# Patient Record
Sex: FEMALE | Race: WHITE | Employment: OTHER | URBAN - METROPOLITAN AREA
[De-identification: names, ages, dates, MRNs, and addresses within clinical notes are randomized per-mention and may not be internally consistent; named-entity substitution may affect disease eponyms.]

---

## 2017-07-28 ENCOUNTER — OFFICE VISIT (OUTPATIENT)
Dept: BEHAVIORAL/MENTAL HEALTH CLINIC | Age: 62
End: 2017-07-28

## 2017-07-28 VITALS
HEART RATE: 72 BPM | SYSTOLIC BLOOD PRESSURE: 144 MMHG | WEIGHT: 169 LBS | HEIGHT: 62 IN | BODY MASS INDEX: 31.1 KG/M2 | DIASTOLIC BLOOD PRESSURE: 62 MMHG

## 2017-07-28 DIAGNOSIS — F41.9 ANXIETY DISORDER, UNSPECIFIED TYPE: ICD-10-CM

## 2017-07-28 DIAGNOSIS — G47.00 INSOMNIA, UNSPECIFIED TYPE: ICD-10-CM

## 2017-07-28 DIAGNOSIS — F32.2 SEVERE MAJOR DEPRESSIVE DISORDER (HCC): Primary | ICD-10-CM

## 2017-07-28 RX ORDER — ONDANSETRON HYDROCHLORIDE 8 MG/1
TABLET, FILM COATED ORAL
Refills: 0 | COMMUNITY
Start: 2017-07-10 | End: 2017-12-18 | Stop reason: ALTCHOICE

## 2017-07-28 RX ORDER — CITALOPRAM 20 MG/1
TABLET, FILM COATED ORAL
Qty: 45 TAB | Refills: 0 | Status: SHIPPED | OUTPATIENT
Start: 2017-07-28 | End: 2017-09-12

## 2017-07-28 RX ORDER — GLIPIZIDE 5 MG/1
10 TABLET, FILM COATED, EXTENDED RELEASE ORAL DAILY
COMMUNITY
Start: 2017-06-06 | End: 2022-05-13

## 2017-07-28 RX ORDER — METFORMIN HYDROCHLORIDE 500 MG/1
TABLET, EXTENDED RELEASE ORAL
COMMUNITY
Start: 2017-05-27 | End: 2018-08-07 | Stop reason: ALTCHOICE

## 2017-07-28 NOTE — PROGRESS NOTES
Psychiatric Initial Evaluation     Chief Complaint: had concerns including Mental Health Problem. History of Present Illness: Janet Almanzar is a 60 y.o. female who presented today to establish her OP psychiatric care. Pt gives long history of depression, starting since her childhood, states she was a lonely child, had no friends. As long as she remembers she has always been sad and depressed. She started seeking help for her depression in her 35s. She saw multiple psychiatrist since then, her last one being Dr. Madalyn Bowman whom she saw about 6-7 yrs ago. During this time period she took multiple psych meds, none of them worked. She started taking Celexa through her PCP for the last several yrs. Although she is on full therapeutic dose of Celexa, she still feels depressed, feels lethargic, tired, does not want to do anything, feels hopeless and helpless, feels worthless, has poor focus and concentration. Does not have any active suicidal thoughts but wishes that she was never born. Not sleeping well, appetite is off. She feels that her anxiety is bad again. She denies any YESSENIA, denies any psychotic or manic symptoms, denies any OCD symptoms. Does verbalize having relationship problems with her  of 39 yrs, from whom she was  for 2 yrs in the past, but got back together later.      Past Psychiatric History:     Previous psychiatric care: as noted above   Previous suicide attempts: none reported   Previous hospitalizations: once, long time ago  Current psychotropics: Celexa, Sonata, Klonopin  Previous psychotropics: Prozac, Lexapro, Zoloft, Effexor, Elavil, Cymbalta  Substance use: drinks alcohol occasionally, used marijuana once in the past  Rehab history: none reported           Social History:   Pt was brought up by both parents, there was no abuse reported, pt reports being a shy child, had no friends, felt lonely, youngest of 1 sister and 2 brothers, completed HS and some college, worked in [employer] for 16 yrs, took early alf to take care of her mom who passed away a yr ago. Pt is  for the last 39 yrs, has 2 grown children, lives with her , was  from her  in the past for 2 yrs. Drinks alcohol occasionally, does report smoking marijuana once in the past.     Family History: Mother with depression, daughter with depression  Family History   Problem Relation Age of Onset    Post-op Nausea/Vomiting Mother     Cancer Paternal Aunt      breast        Past Medical History:   Past Medical History:   Diagnosis Date    Arthritis     Breast disorder 03/24/2011    fibrocystic breasts    Chest pain, unspecified 11/10/2010    Chronic pain     abdomen/stomach/constipation    Essential hypertension, benign 11/10/2010    Family history of cardiovascular disease 11/10/2010    Fibromyalgia     Genital herpes     GERD (gastroesophageal reflux disease)     Hypertension     Mixed hyperlipidemia 11/10/2010    Nausea & vomiting     Nonspecific abnormal electrocardiogram (ECG) (EKG) 11/10/2010    Osteopenia     Palpitations 11/10/2010    Sleep apnea     does not sleep with machine but carries the dx          Allergies: Allergies   Allergen Reactions    Augmentin [Amoxicillin-Pot Clavulanate] Diarrhea and Nausea and Vomiting    Darvocet A500 [Propoxyphene N-Acetaminophen] Other (comments)     Heart flutters    Doxycycline Other (comments)     Stomach pain and diarrhea. Has taken since without problems.  Gentamicin Swelling    Percocet [Oxycodone-Acetaminophen] Rash and Itching    Stelazine Swelling     Tongue swelling    Sulfa (Sulfonamide Antibiotics) Hives    Sumycin [Tetracycline] Other (comments)     Tongue tingling    Zegerid [Omeprazole-Sodium Bicarbonate] Unknown (comments)     Takes nexium without problems, but if she takes zegerid, she has nausea and sweating.         Medication List:   Current Outpatient Prescriptions   Medication Sig Dispense Refill  glipiZIDE SR (GLUCOTROL XL) 5 mg CR tablet       metFORMIN ER (GLUCOPHAGE XR) 500 mg tablet       ondansetron hcl (ZOFRAN) 8 mg tablet TAKE 1 TABLET BY MOUTH EVERY 8 HOURS AS NEEDED FOR NAUSEA AND/OR VOMITING. MAY CAUSE CONSTIPATION AND HEADACHE  0    citalopram (CELEXA) 20 mg tablet Take 1/1/2 tablet daily for 15 days then take 1 tablet daily  Indications: ANXIETY WITH DEPRESSION 45 Tab 0    vortioxetine (TRINTELLIX) 10 mg tablet Take 1 Tab by mouth daily. Indications: major depressive disorder 30 Tab 0    diltiazem CD (CARTIA XT) 240 mg ER capsule Take 240 mg by mouth daily.  ASPIRIN PO Take 81 mg by mouth daily.  esomeprazole (NEXIUM) 20 mg capsule Take 40 mg by mouth daily.  cholecalciferol (VITAMIN D3) 1,000 unit tablet Take 1,000 Units by mouth daily.  IBUPROFEN (ADVIL PO) Take  by mouth as needed.  estradiol (VAGIFEM) 10 mcg tab vaginal tablet Insert 10 mcg into vagina two (2) days a week.  zaleplon (SONATA) 5 mg capsule Take 5 mg by mouth nightly as needed.  cetirizine (ZYRTEC) 10 mg tablet Take 10 mg by mouth nightly.  raloxifene (EVISTA) 60 mg tablet Take 60 mg by mouth daily.  clonazepam (KLONOPIN) 1 mg tablet Take 1 mg by mouth two (2) times daily as needed.  fenofibrate 160 mg Tab Take 1 Tab by mouth nightly.  valacyclovir (VALTREX) 1 g tablet Take 1,000 mg by mouth as needed. A comprehensive review of systems was negative except for that written in the HPI. .  The client currently denies suicidal and homicidal ideation. Client reports depression, anxiety, poor energy, hopelessness, helplessness, worthlessness. Client denies auditory and visual hallucinations.            Mental Status exam: WNL except for    Sensorium  oriented to time, place and person   Relations cooperative    Eye Contact    appropriate   Appearance:  age appropriate and appropriately dressed and groomed, sad looking, obese   Motor Behavior:  within normal limits   Speech:  normal pitch, normal volume and soft   Thought Process: goal directed and logical   Thought Content free of delusions and free of hallucinations   Suicidal ideations none   Homicidal ideations none   Mood:  Anxious and depressed    Affect:  Constricted and depressed    Memory recent  adequate   Memory remote:  adequate   Concentration:  adequate   Abstraction:  abstract   Insight:  good   Reliability good   Judgment:  good     Diagnoses:   Axis I:  Major Depressive disorder, recurrent, severe, without psychotic features             Anxiety disorder             Insomnia   Axis II: Deferred  Axis III: As noted above  Axis IV:Moderate to Severe  Axis V: 55-60    Assessment:  The client, Wanda Blackmon is a 60 y.o. female presented today to establish her OP psychiatric care. Pt has life long depression and anxiety, started getting treatment since in her 35s, seen several psychiatrists since then and received multiple meds, none seemed to have worked. Last seen psychiatrist about 7 yrs ago, currently receiving Celexa from her PCP, continues to endorse depressed mood, anxiety, hopelessness and helplessness, chronic wishes of not being born, no active suicidal ideation, no homicidal ideations, no psychosis or leroy present. Treatment Plan:   1. Medication: Trintellix 10mg daily, taper down Celexa, 30mg daily for 15 days, then 20mg daily, Sonata and Klonopin as needed for sleep and anxiety respectively. 2. Psychotherapy: Provided supportive psychotherapy  3. Medical: follow up with PCP as scheduled  4. Follow-up Disposition:  Return in about 1 month (around 8/28/2017). The risk versus benefits of treatment were discussed and side effects explained. the risks and benefits of the proposed medication    Patient verbalized understanding and agreed with plan. Patient instructed to call with any side effects.      Maryann Arita MD, Psychiatry  7/28/2017

## 2017-07-28 NOTE — MR AVS SNAPSHOT
Visit Information Date & Time Provider Department Dept. Phone Encounter #  
 7/28/2017 10:00 AM Laura Moses MD [facility] [id number] Follow-up Instructions Return in about 1 month (around 8/28/2017). Upcoming Health Maintenance Date Due Hepatitis C Screening 1955 DTaP/Tdap/Td series (1 - Tdap) 1/28/1975 PAP AKA CERVICAL CYTOLOGY 1/28/1975 FOBT Q 1 YEAR AGE 50-75 1/28/2004 BREAST CANCER SCRN MAMMOGRAM 7/7/2011 ZOSTER VACCINE AGE 60> 11/28/2013 INFLUENZA AGE 9 TO ADULT 8/1/2017 Allergies as of 7/28/2017  Review Complete On: 7/28/2017 By: Laura Moses MD  
  
 Severity Noted Reaction Type Reactions Augmentin [Amoxicillin-pot Clavulanate]  07/22/2016    Diarrhea, Nausea and Vomiting Darvocet A500 [Propoxyphene N-acetaminophen]  11/10/2010    Other (comments) Heart flutters Doxycycline  11/10/2010    Other (comments) Stomach pain and diarrhea. Has taken since without problems. Gentamicin  07/22/2016    Swelling Percocet [Oxycodone-acetaminophen]  11/10/2010    Rash, Itching Stelazine  03/10/2011    Swelling Tongue swelling Sulfa (Sulfonamide Antibiotics)  11/10/2010    Hives Sumycin [Tetracycline]  03/10/2011    Other (comments) Tongue tingling Zegerid [Omeprazole-sodium Bicarbonate]  11/10/2010    Unknown (comments) Takes nexium without problems, but if she takes zegerid, she has nausea and sweating. Current Immunizations  Never Reviewed No immunizations on file. Not reviewed this visit You Were Diagnosed With   
  
 Codes Comments Severe major depressive disorder ([facility] [address]    -  Primary ICD-10-CM: T68.6 ICD-9-CM: 296.23 Anxiety disorder, unspecified type     ICD-10-CM: F41.9 ICD-9-CM: 300.00 Insomnia, unspecified type     ICD-10-CM: G47.00 ICD-9-CM: 780.52 Vitals BP Pulse Height(growth percentile) Weight(growth percentile) BMI OB Status 144/62 72 5' 2\" (1.575 m) 169 lb (76.7 kg) 30.91 kg/m2 Hysterectomy Smoking Status Former Smoker BMI and BSA Data Body Mass Index Body Surface Area 30.91 kg/m 2 1.83 m 2 Preferred Pharmacy Pharmacy Name Phone [facility] [address] [place] [address] Oskar Stratton [phone] Your Updated Medication List  
  
   
This list is accurate as of: 7/28/17 10:43 AM.  Always use your most recent med list. ADVIL PO Take  by mouth as needed. ASPIRIN PO Take 81 mg by mouth daily. CARTIA  mg ER capsule Generic drug:  dilTIAZem CD Take 240 mg by mouth daily. cetirizine 10 mg tablet Commonly known as:  ZYRTEC Take 10 mg by mouth nightly. citalopram 20 mg tablet Commonly known as:  Eugenia Browning Take 1/1/2 tablet daily for 15 days then take 1 tablet daily  Indications: ANXIETY WITH DEPRESSION  
  
 clonazePAM 1 mg tablet Commonly known as:  Saul Pool Take 1 mg by mouth two (2) times daily as needed. estradiol 10 mcg Tab vaginal tablet Commonly known as:  Ivan Velazquez Insert 10 mcg into vagina two (2) days a week. fenofibrate 160 mg tablet Commonly known as:  LOFIBRA Take 1 Tab by mouth nightly. glipiZIDE SR 5 mg CR tablet Commonly known as:  GLUCOTROL XL  
  
 metFORMIN  mg tablet Commonly known as:  GLUCOPHAGE XR NexIUM 20 mg capsule Generic drug:  esomeprazole Take 40 mg by mouth daily. ondansetron hcl 8 mg tablet Commonly known as:  ZOFRAN  
TAKE 1 TABLET BY MOUTH EVERY 8 HOURS AS NEEDED FOR NAUSEA AND/OR VOMITING. MAY CAUSE CONSTIPATION AND HEADACHE  
  
 raloxifene 60 mg tablet Commonly known as:  EVISTA Take 60 mg by mouth daily. valACYclovir 1 gram tablet Commonly known as:  VALTREX Take 1,000 mg by mouth as needed. VITAMIN D3 1,000 unit tablet Generic drug:  cholecalciferol Take 1,000 Units by mouth daily. vortioxetine 10 mg tablet Commonly known as:  TRINTELLIX Take 1 Tab by mouth daily. Indications: major depressive disorder  
  
 zaleplon 5 mg capsule Commonly known as:  SONATA Take 5 mg by mouth nightly as needed. Prescriptions Sent to Pharmacy Refills  
 citalopram (CELEXA) 20 mg tablet 0 Sig: Take 1/1/2 tablet daily for 15 days then take 1 tablet daily  Indications: ANXIETY WITH DEPRESSION Class: Normal  
 Pharmacy: [address], [address] [place] [address] Ph #: [phone]  
 vortioxetine (TRINTELLIX) 10 mg tablet 0 Sig: Take 1 Tab by mouth daily. Indications: major depressive disorder Class: Normal  
 Pharmacy: [address], [address] [place] [address] [place] [address]  [phone] Ph #: [phone] Route: Oral  
  
Follow-up Instructions Return in about 1 month (around 8/28/2017). Introducing [facility]! [employer] introduces [employer] patient portal. Now you can access parts of your medical record, email your doctor's office, and request medication refills online. 1. In your internet browser, go to [email or web address] 2. Click on the First Time User? Click Here link in the Sign In box. You will see the New Member Sign Up page. 3. Enter your Dot[employer] Access Code exactly as it appears below. You will not need to use this code after youve completed the sign-up process. If you do not sign up before the expiration date, you must request a new code. · Dot[employer] Access Code: [email or web address] Expires: 10/26/2017 10:26 AM 
 
4. Enter the last four digits of your Social Security Number (xxxx) and Date of Birth (mm/dd/yyyy) as indicated and click Submit. You will be taken to the next sign-up page. 5. Create a DotGT ID. This will be your DotGT login ID and cannot be changed, so think of one that is secure and easy to remember. 6. Create a [employer] password. You can change your password at any time. 7. Enter your Password Reset Question and Answer. This can be used at a later time if you forget your password. 8. Enter your e-mail address. You will receive e-mail notification when new information is available in [address] 9. Click Sign Up. You can now view and download portions of your medical record. 10. Click the Download Summary menu link to download a portable copy of your medical information. If you have questions, please visit the Frequently Asked Questions section of the [employer] website. Remember, [employer] is NOT to be used for urgent needs. For medical emergencies, dial 911. Now available from your iPhone and Android! Please provide this summary of care documentation to your next provider. Your primary care clinician is listed as George Hugo. If you have any questions after today's visit, please call [phone].

## 2017-09-12 ENCOUNTER — OFFICE VISIT (OUTPATIENT)
Dept: BEHAVIORAL/MENTAL HEALTH CLINIC | Age: 62
End: 2017-09-12

## 2017-09-12 VITALS
BODY MASS INDEX: 32.2 KG/M2 | SYSTOLIC BLOOD PRESSURE: 148 MMHG | DIASTOLIC BLOOD PRESSURE: 77 MMHG | HEART RATE: 91 BPM | HEIGHT: 62 IN | RESPIRATION RATE: 20 BRPM | TEMPERATURE: 98.6 F | WEIGHT: 175 LBS

## 2017-09-12 DIAGNOSIS — F41.9 ANXIETY DISORDER, UNSPECIFIED TYPE: ICD-10-CM

## 2017-09-12 DIAGNOSIS — F39 MOOD DISORDER (HCC): Primary | ICD-10-CM

## 2017-09-12 RX ORDER — LAMOTRIGINE 25 MG/1
TABLET ORAL
Qty: 30 TAB | Refills: 0 | Status: SHIPPED | OUTPATIENT
Start: 2017-09-12 | End: 2017-10-05 | Stop reason: DRUGHIGH

## 2017-09-12 RX ORDER — CLONAZEPAM 1 MG/1
1 TABLET ORAL
Qty: 60 TAB | Refills: 0 | Status: SHIPPED | OUTPATIENT
Start: 2017-09-12 | End: 2017-10-05

## 2017-09-12 NOTE — MR AVS SNAPSHOT
Visit Information Date & Time Provider Department Dept. Phone Encounter #  
 9/12/2017 11:30 AM [place] Moses [place] [address] [facility] [phone] [id number] Follow-up Instructions Return in about 1 month (around 10/12/2017). Upcoming Health Maintenance Date Due Hepatitis C Screening 1955 DTaP/Tdap/Td series (1 - Tdap) 1/28/1975 PAP AKA CERVICAL CYTOLOGY 1/28/1975 FOBT Q 1 YEAR AGE 50-75 1/28/2004 BREAST CANCER SCRN MAMMOGRAM 7/7/2011 ZOSTER VACCINE AGE 60> 11/28/2013 INFLUENZA AGE 9 TO ADULT 8/1/2017 Allergies as of 9/12/2017  Review Complete On: 9/12/2017 By: Laura Moses MD  
  
 Severity Noted Reaction Type Reactions Augmentin [Amoxicillin-pot Clavulanate]  07/22/2016    Diarrhea, Nausea and Vomiting Darvocet A500 [Propoxyphene N-acetaminophen]  11/10/2010    Other (comments) Heart flutters Doxycycline  11/10/2010    Other (comments) Stomach pain and diarrhea. Has taken since without problems. Gentamicin  07/22/2016    Swelling Percocet [Oxycodone-acetaminophen]  11/10/2010    Rash, Itching Stelazine  03/10/2011    Swelling Tongue swelling Sulfa (Sulfonamide Antibiotics)  11/10/2010    Hives Sumycin [Tetracycline]  03/10/2011    Other (comments) Tongue tingling Zegerid [Omeprazole-sodium Bicarbonate]  11/10/2010    Unknown (comments) Takes nexium without problems, but if she takes zegerid, she has nausea and sweating. Current Immunizations  Never Reviewed No immunizations on file. Not reviewed this visit You Were Diagnosed With   
  
 Codes Comments Mood disorder ([facility]    -  Primary ICD-10-CM: F39 
ICD-9-CM: 296.90 Anxiety disorder, unspecified type     ICD-10-CM: F41.9 ICD-9-CM: 300.00 Vitals BP Pulse Temp Resp Height(growth percentile) Weight(growth percentile) 148/77 91 98.6 °F (37 °C) (Oral) 20 5' 2\" (1.575 m) 175 lb (79.4 kg) BMI OB Status Smoking Status 32.01 kg/m2 Hysterectomy Former Smoker Vitals History BMI and BSA Data Body Mass Index Body Surface Area 32.01 kg/m 2 1.86 m 2 Preferred Pharmacy Pharmacy Name Phone [facility] [address] Heather Choudhary [phone] Your Updated Medication List  
  
   
This list is accurate as of: 9/12/17 12:20 PM.  Always use your most recent med list. ADVIL PO Take 800 mg by mouth as needed for Pain. ASPIRIN PO Take 81 mg by mouth daily. CARTIA  mg ER capsule Generic drug:  dilTIAZem CD Take 240 mg by mouth daily. cetirizine 10 mg tablet Commonly known as:  ZYRTEC Take 10 mg by mouth nightly. clonazePAM 1 mg tablet Commonly known as:  Luis Daniel Sheppard Take 1 Tab by mouth two (2) times daily as needed. Max Daily Amount: 2 mg.  
  
 estradiol 10 mcg Tab vaginal tablet Commonly known as:  Erin [employer] Insert 10 mcg into vagina two (2) days a week. fenofibrate 160 mg tablet Commonly known as:  LOFIBRA Take 1 Tab by mouth nightly. glipiZIDE SR 5 mg CR tablet Commonly known as:  GLUCOTROL XL Take 5 mg by mouth daily. lamoTRIgine 25 mg tablet Commonly known as: LaMICtal  
Take 1/2 tablet daily for a week then 1 tablet daily  Indications: mood disorder  
  
 metFORMIN  mg tablet Commonly known as:  GLUCOPHAGE XR NexIUM 20 mg capsule Generic drug:  esomeprazole Take 40 mg by mouth daily. ondansetron hcl 8 mg tablet Commonly known as:  ZOFRAN  
TAKE 1 TABLET BY MOUTH EVERY 8 HOURS AS NEEDED FOR NAUSEA AND/OR VOMITING. MAY CAUSE CONSTIPATION AND HEADACHE  
  
 raloxifene 60 mg tablet Commonly known as:  EVISTA Take 60 mg by mouth daily. valACYclovir 1 gram tablet Commonly known as:  VALTREX Take 1,000 mg by mouth as needed. VITAMIN D3 1,000 unit tablet Generic drug:  cholecalciferol Take 1,000 Units by mouth daily. vortioxetine 10 mg tablet Commonly known as:  TRINTELLIX Take 1/2 tablet daily for 7 days and then stop.  
  
 zaleplon 5 mg capsule Commonly known as:  SONATA Take 5 mg by mouth nightly as needed. Prescriptions Printed Refills  
 clonazePAM (KLONOPIN) 1 mg tablet 0 Sig: Take 1 Tab by mouth two (2) times daily as needed. Max Daily Amount: 2 mg. Class: Print Route: Oral  
 lamoTRIgine (LAMICTAL) 25 mg tablet 0 Sig: Take 1/2 tablet daily for a week then 1 tablet daily  Indications: mood disorder Class: Print Follow-up Instructions Return in about 1 month (around 10/12/2017). Introducing [facility]! [employer] introduces [employer] patient portal. Now you can access parts of your medical record, email your doctor's office, and request medication refills online. 1. In your internet browser, go to [email or web address] 2. Click on the First Time User? Click Here link in the Sign In box. You will see the New Member Sign Up page. 3. Enter your [employer] Access Code exactly as it appears below. You will not need to use this code after youve completed the sign-up process. If you do not sign up before the expiration date, you must request a new code. · [employer] Access Code: [email or web address] Expires: 10/26/2017 10:26 AM 
 
4. Enter the last four digits of your Social Security Number (xxxx) and Date of Birth (mm/dd/yyyy) as indicated and click Submit. You will be taken to the next sign-up page. 5. Create a Metrolightt ID. This will be your [employer] login ID and cannot be changed, so think of one that is secure and easy to remember. 6. Create a [employer] password. You can change your password at any time. 7. Enter your Password Reset Question and Answer. This can be used at a later time if you forget your password. 8. Enter your e-mail address.  You will receive e-mail notification when new information is available in [employer]. 9. Click Sign Up. You can now view and download portions of your medical record. 10. Click the Download Summary menu link to download a portable copy of your medical information. If you have questions, please visit the Frequently Asked Questions section of the [employer] website. Remember, [employer] is NOT to be used for urgent needs. For medical emergencies, dial 911. Now available from your iPhone and Android! Please provide this summary of care documentation to your next provider. Your primary care clinician is listed as Yelena Salazar. If you have any questions after today's visit, please call [phone].

## 2017-09-12 NOTE — PROGRESS NOTES
Chief Complaint   Patient presents with    Depression    Anxiety     Here for f/u.    1. Have you been to the ER, urgent care clinic since your last visit? Hospitalized since your last visit? No     2. Have you seen or consulted any other health care providers outside of the [address] [place] [address] since your last visit? Include any pap smears or colon screening.  No

## 2017-09-13 ENCOUNTER — OFFICE VISIT (OUTPATIENT)
Dept: BEHAVIORAL/MENTAL HEALTH CLINIC | Age: 62
End: 2017-09-13

## 2017-09-13 DIAGNOSIS — F32.2 SEVERE MAJOR DEPRESSION WITHOUT PSYCHOTIC FEATURES (HCC): Primary | ICD-10-CM

## 2017-09-13 DIAGNOSIS — F41.1 GENERALIZED ANXIETY DISORDER: ICD-10-CM

## 2017-09-14 PROBLEM — F32.2 SEVERE MAJOR DEPRESSION WITHOUT PSYCHOTIC FEATURES (HCC): Status: ACTIVE | Noted: 2017-09-14

## 2017-09-14 PROBLEM — F41.9 ANXIETY DISORDER: Status: ACTIVE | Noted: 2017-09-14

## 2017-09-14 NOTE — PROGRESS NOTES
Outpatient Initial Assessment and Psychotherapy Note           Chief Complaint:     Patient presents with anxiety, depression, concern about health problems, relationship difficulties and \"always feels angry\", agoraphobia. History of Present Illness: Mauricio Oscar is a 60 y.o. female who presents with symptoms of depression and anxiety, hopelessness, with reported constant underlying anger. States she has been depressed \"all her life\". Still grieving loss of mother, has chronic sleep disorder, chest pains and dizziness. Scheduled for cardiac tests next week. She suffers from low self- worth, poor self-image, in a loveless marriage (see social history), suicidal thoughts but no plan. Works business part of [employer] and he is dissatisfied with her often. She cares for granddaughters, but is finding this too much now. Isolated, staying in the house most days in [address] Substance Abuse History:   No abuse, drinks alcohol occasionally. Current  Medication List:   Current Outpatient Prescriptions   Medication Sig Dispense Refill    clonazePAM (KLONOPIN) 1 mg tablet Take 1 Tab by mouth two (2) times daily as needed. Max Daily Amount: 2 mg. 60 Tab 0    lamoTRIgine (LAMICTAL) 25 mg tablet Take 1/2 tablet daily for a week then 1 tablet daily  Indications: mood disorder 30 Tab 0    vortioxetine (TRINTELLIX) 10 mg tablet Take 1/2 tablet daily for 7 days and then stop. 7 Tab 0    glipiZIDE SR (GLUCOTROL XL) 5 mg CR tablet Take 5 mg by mouth daily.  metFORMIN ER (GLUCOPHAGE XR) 500 mg tablet       ondansetron hcl (ZOFRAN) 8 mg tablet TAKE 1 TABLET BY MOUTH EVERY 8 HOURS AS NEEDED FOR NAUSEA AND/OR VOMITING. MAY CAUSE CONSTIPATION AND HEADACHE  0    diltiazem CD (CARTIA XT) 240 mg ER capsule Take 240 mg by mouth daily.  ASPIRIN PO Take 81 mg by mouth daily.  esomeprazole (NEXIUM) 20 mg capsule Take 40 mg by mouth daily.       cholecalciferol (VITAMIN D3) 1,000 unit tablet Take 1,000 Units by mouth daily.  IBUPROFEN (ADVIL PO) Take 800 mg by mouth as needed for Pain.  estradiol (VAGIFEM) 10 mcg tab vaginal tablet Insert 10 mcg into vagina two (2) days a week.  zaleplon (SONATA) 5 mg capsule Take 5 mg by mouth nightly as needed.  cetirizine (ZYRTEC) 10 mg tablet Take 10 mg by mouth nightly.  raloxifene (EVISTA) 60 mg tablet Take 60 mg by mouth daily.  fenofibrate 160 mg Tab Take 1 Tab by mouth nightly.  valacyclovir (VALTREX) 1 g tablet Take 1,000 mg by mouth as needed. Family Psychiatric History:   Family History   Problem Relation Age of Onset    Post-op Nausea/Vomiting Mother     Heart Attack Mother     Cancer Paternal Aunt      breast    Stroke Father           Family medical problems:  Family History   Problem Relation Age of Onset    Post-op Nausea/Vomiting Mother     Heart Attack Mother     Cancer Paternal Aunt      breast    Stroke Father        Social History:      Social History     Social History    Marital status:      Spouse name: N/A    Number of children: N/A    Years of education: N/A     Occupational History    Not on file. Social History Main Topics    Smoking status: Former Smoker     Packs/day: 0.50    Smokeless tobacco: Never Used      Comment: quit 2 1/2 years ago - was an on and off smoker then    Alcohol use Yes      Comment: occas    Drug use: No    Sexual activity: Not Currently     Partners: Male      Comment:       Other Topics Concern    Not on file     Social History Narrative    Bobby Valencia, 61 yo  woman, has been  to [place] [address], [address], for 44 years, with a 2 year separation 1997-98. Reports poor marital communication with Silvia Griffin, who is a \"workaholic\" who is annoyed with her.   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 They have two daughters: Sara Santillan, 8, and Kevin, 3. She has a poor relationship with her daughter-in-law and son. Jessica Berman worked for the [employer] and retired 3 years ago to care for her mother, Tasneem Locke, who  1 year ago at 80, a strong woman who suffered from depression. Her father, [place],  from a stroke at 80 about 10 years ago. 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 She reports a childhood with no abuse or neglect; she was a shy, quiet child with few friends, dated little and  the first man who seemed to find her worthy. She cares for her daughter's children some, but is finding this too much recently. Allergies: Allergies   Allergen Reactions    Augmentin [Amoxicillin-Pot Clavulanate] Diarrhea and Nausea and Vomiting    Darvocet A500 [Propoxyphene N-Acetaminophen] Other (comments)     Heart flutters    Doxycycline Other (comments)     Stomach pain and diarrhea. Has taken since without problems.  Gentamicin Swelling    Percocet [Oxycodone-Acetaminophen] Rash and Itching    Stelazine Swelling     Tongue swelling    Sulfa (Sulfonamide Antibiotics) Hives    Sumycin [Tetracycline] Other (comments)     Tongue tingling    Zegerid [Omeprazole-Sodium Bicarbonate] Unknown (comments)     Takes nexium without problems, but if she takes zegerid, she has nausea and sweating.           Psychiatric/Mental Status Examination:     MENTAL STATUS EXAM:  Sensorium  Alert and Oriented x 2   Relations cooperative   Eye Contact appropriate   Appearance:  casually dressed   Motor Behavior:  within normal limits   Speech:  normal pitch and normal volume   Thought Process: within normal limits   Thought Content free of delusions and free of hallucinations   Suicidal ideations no plan , no intention and thoughts   Homicidal ideations none   Mood:  anxious, depressed and sad   Affect:  depressed and tearful   Memory recent  adequate   Memory remote:  adequate   Concentration:  adequate   Abstraction:  abstract   Insight:  good   Reliability good   Judgment:  good         Diagnoses:    ICD-10-CM ICD-9-CM    1. Severe major depression without psychotic features ([facility] F32.2 296.23    2. Generalized anxiety disorder F41.1 300.02        Assessment:  Wilfrido Mosquera is extremely chronically depressed with low self-worth, hopeless and helpless, in a poor marital relationship and isolating herself. Persistent, underlying anger. May be suffering with cardiac problems TBD. Treatment Plan:  Weekly, individual psychotherapy      The nature and course of the patient's psychiatric diagnosis were discussed with the patient. Office and confidentiality policies and procedures were discussed with patient. The patient has my contact information for routine or urgent concerns.       Jatin Gibson LCSW  9/14/2017

## 2017-09-14 NOTE — PROGRESS NOTES
Psychiatric Progress Note    Date: 9/12/2017  Account Number:  [id number]  Name: Sujata Rich    Length of psychotherapy session: 17 minutes     Total Patient Care Time Spent: 25 minutes : (Coordinated care:  counseling time with patient, individual psychotherapy with patient; discussions with family members and chart review). SUBJECTIVE:   Sujata Rich  is a 60 y.o.  female  patient presents for a therapy/psychopharmacological management appointment. Pt reports being off of Celexa now. States she is crying more, there is \"anger inside me\",  does not pay attention which bothers patient a lot. Overall pessimist in her approach, nothing seems to be going right. Patient denies SI/HI/SIB. No evidence of AH/VH or delusions.       Appetite:no change from normal   Sleep: no change     Response to Treatment: Equivocal   Side Effects: none      Supportive/Cognitive/Reality-Oriented psychotherapy provided in regards to psychosocial stressors:   pre-admission and current problems   Housing issues   Occupational issues   Academic issues   Legal issues   Medical issues   Interpersonal conflicts   Stress of hospitalization  Psychoeducation provided  Treatment plan reviewed with patient-including diagnosis and medications  Worked on issues of denial & effects of substance dependency/use      OBJECTIVE:                 Mental Status exam: WNL except for      Sensorium  oriented to time, place and person   Relations cooperative, passive    Eye Contact    fair to poor   Appearance:  age appropriate, casually dressed and obese   Motor Behavior:  within normal limits   Speech:  normal pitch and normal volume   Thought Process: goal directed   Thought Content free of delusions and free of hallucinations   Suicidal ideations none   Homicidal ideations none   Mood:  anxious and depressed   Affect:  constricted and depressed   Memory recent  adequate   Memory remote:  adequate   Concentration:  adequate   Abstraction:  abstract Insight:  fair   Reliability fair   Judgment:  fair       Allergies   Allergen Reactions    Augmentin [Amoxicillin-Pot Clavulanate] Diarrhea and Nausea and Vomiting    Darvocet A500 [Propoxyphene N-Acetaminophen] Other (comments)     Heart flutters    Doxycycline Other (comments)     Stomach pain and diarrhea. Has taken since without problems.  Gentamicin Swelling    Percocet [Oxycodone-Acetaminophen] Rash and Itching    Stelazine Swelling     Tongue swelling    Sulfa (Sulfonamide Antibiotics) Hives    Sumycin [Tetracycline] Other (comments)     Tongue tingling    Zegerid [Omeprazole-Sodium Bicarbonate] Unknown (comments)     Takes nexium without problems, but if she takes zegerid, she has nausea and sweating. Current Outpatient Prescriptions   Medication Sig Dispense Refill    clonazePAM (KLONOPIN) 1 mg tablet Take 1 Tab by mouth two (2) times daily as needed. Max Daily Amount: 2 mg. 60 Tab 0    lamoTRIgine (LAMICTAL) 25 mg tablet Take 1/2 tablet daily for a week then 1 tablet daily  Indications: mood disorder 30 Tab 0    vortioxetine (TRINTELLIX) 10 mg tablet Take 1/2 tablet daily for 7 days and then stop. 7 Tab 0    glipiZIDE SR (GLUCOTROL XL) 5 mg CR tablet Take 5 mg by mouth daily.  metFORMIN ER (GLUCOPHAGE XR) 500 mg tablet       ondansetron hcl (ZOFRAN) 8 mg tablet TAKE 1 TABLET BY MOUTH EVERY 8 HOURS AS NEEDED FOR NAUSEA AND/OR VOMITING. MAY CAUSE CONSTIPATION AND HEADACHE  0    diltiazem CD (CARTIA XT) 240 mg ER capsule Take 240 mg by mouth daily.  ASPIRIN PO Take 81 mg by mouth daily.  esomeprazole (NEXIUM) 20 mg capsule Take 40 mg by mouth daily.  cholecalciferol (VITAMIN D3) 1,000 unit tablet Take 1,000 Units by mouth daily.  IBUPROFEN (ADVIL PO) Take 800 mg by mouth as needed for Pain.  estradiol (VAGIFEM) 10 mcg tab vaginal tablet Insert 10 mcg into vagina two (2) days a week.       zaleplon (SONATA) 5 mg capsule Take 5 mg by mouth nightly as needed.  cetirizine (ZYRTEC) 10 mg tablet Take 10 mg by mouth nightly.  raloxifene (EVISTA) 60 mg tablet Take 60 mg by mouth daily.  fenofibrate 160 mg Tab Take 1 Tab by mouth nightly.  valacyclovir (VALTREX) 1 g tablet Take 1,000 mg by mouth as needed. Medication Changes/Adjustments:   Medications Discontinued During This Encounter   Medication Reason    citalopram (CELEXA) 20 mg tablet Not A Current Medication    clonazePAM (KLONOPIN) 1 mg tablet Reorder    TRINTELLIX 10 mg tablet Reorder          ASSESSMENT:  Antonia Harry  is a 60 y.o.  female patient presented for her f/u appointment. Feeling depressed, and pessimistic. Will benefit from psychotherapy along with medication management. As pt has been tried on almost all the antidepressants in the past with minimum to no response, will try a mood stabilizer to help with mood, need to reassess the diagnosis and r/o Bipolar Depresson    Diagnoses:   Axis I:  Major Depressive disorder, recurrent, severe, without psychotic features vs Bipolar Depression             Anxiety disorder             Insomnia   Axis II: Personality disorder NOS  Axis III: As noted above  Axis IV:Moderate to Severe  Axis V: 55-60    RECOMMENDATIONS/PLAN:   1. Medications: Medications reviewed, taper down Trintellix and then stop, start pt on low dose of Lamictal, continue rest of the meds as such. Orders Placed This Encounter    clonazePAM (KLONOPIN) 1 mg tablet    lamoTRIgine (LAMICTAL) 25 mg tablet    vortioxetine (TRINTELLIX) 10 mg tablet      2. Follow-up Disposition:  Return in about 1 month (around 10/12/2017).

## 2017-09-27 ENCOUNTER — OFFICE VISIT (OUTPATIENT)
Dept: BEHAVIORAL/MENTAL HEALTH CLINIC | Age: 62
End: 2017-09-27

## 2017-09-27 DIAGNOSIS — F32.2 SEVERE MAJOR DEPRESSION WITHOUT PSYCHOTIC FEATURES (HCC): Primary | ICD-10-CM

## 2017-09-27 DIAGNOSIS — F41.1 GENERALIZED ANXIETY DISORDER: ICD-10-CM

## 2017-09-28 NOTE — PROGRESS NOTES
PSYCHOTHERAPY NOTE      Elidia Guillen is a 60 y.o. female who presents with depression and anxiety. Client was seen for an Individual Therapy session that lasted for 50 minutes. Focus of session/Issues addressed:  Hali Arceo continues to be frustrated that she is unable to stop crying. Her heart tests were negative, but now she is concerned about GI issues. Appears her son gives her advice to increase her self-care. She is worried about her daughter's upcoming medical tests, wants to be a support and doesn't know how to accomplish this without crying. Reports constantly feeling angry, to the point of hitting her printer in anger yesterday, a first.  Introduced CBT work on anger and breathing exercises to help regulate her emotions. She is sceptical and frustrated meds are not working faster. Appears she has friends and is getting out with them more. Personalizes interactions in a negative light often. Hopeless. Social History     Social History Narrative    Hali Arceo, 59 yo  woman, has been  to [facility] [address], for 44 years, with a 2 year separation . Reports poor marital communication with Ayaanfredy Willernie, who is a \"workaholic\" who is annoyed with her. 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 They have two daughters: Elisabeth Jean, 8, and Kevin, 3. She has a poor relationship with her daughter-in-law and son. Hali Arceo worked for the [employer] and retired 3 years ago to care for her mother, Kathy Nevarez, who  1 year ago at 80, a strong woman who suffered from depression.   Her father, Trevon Jose,  from a stroke at 81 about 10 years ago. 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 She reports a childhood with no abuse or neglect; she was a shy, quiet child with few friends, dated little and  the first man who seemed to find her worthy. She cares for her daughter's children some, but is finding this too much recently. Mental Status exam:         Sensorium  Alert and Oriented x 2   Relations cooperative   Appearance:  casually dressed and overweight   Motor Behavior:  within normal limits   Speech:  normal pitch and normal volume   Thought Process: logical   Thought Content free of delusions and free of hallucinations   Suicidal ideations none   Homicidal ideations none   Mood:  anxious and depressed   Affect:  euthymic and tearful   Memory recent  adequate   Memory remote:  adequate   Concentration:  adequate   Abstraction:  abstract   Insight:  good   Reliability good   Judgment:  good         DIAGNOSIS AND IMPRESSION:    Current Diagnosis:       ICD-10-CM ICD-9-CM    1. Severe major depression without psychotic features ([facility] F32.2 296.23    2. Generalized anxiety disorder F41.1 300.02      Strengths:  [place] system      Identified goals for therapy are  Decrease anger  Decrease depression  Decrease anxiety    Clinical assignments:   CBT-Anger   Breathing exercises    Interventions/plans:    Supportive/Cognitive/Reality-Oriented psychotherapy provided in regards to psychosocial stressors and current problems. anxiety, depression, anger management, concern about health problems, difficulty sleeping and relationship difficulties  Psychoeducation provided.   Treatment plan reviewed with patient-including diagnosis and medications      Vinita Schumacher LCSW

## 2017-10-05 ENCOUNTER — OFFICE VISIT (OUTPATIENT)
Dept: BEHAVIORAL/MENTAL HEALTH CLINIC | Age: 62
End: 2017-10-05

## 2017-10-05 VITALS
BODY MASS INDEX: 32.02 KG/M2 | SYSTOLIC BLOOD PRESSURE: 174 MMHG | WEIGHT: 174 LBS | DIASTOLIC BLOOD PRESSURE: 77 MMHG | HEART RATE: 88 BPM | HEIGHT: 62 IN

## 2017-10-05 DIAGNOSIS — F41.9 ANXIETY DISORDER, UNSPECIFIED TYPE: ICD-10-CM

## 2017-10-05 DIAGNOSIS — G47.00 INSOMNIA, UNSPECIFIED TYPE: ICD-10-CM

## 2017-10-05 DIAGNOSIS — F31.9 BIPOLAR DEPRESSION (HCC): Primary | ICD-10-CM

## 2017-10-05 RX ORDER — LORAZEPAM 1 MG/1
1 TABLET ORAL
Qty: 60 TAB | Refills: 0 | Status: SHIPPED | OUTPATIENT
Start: 2017-10-05 | End: 2017-10-30 | Stop reason: SDUPTHER

## 2017-10-05 RX ORDER — CYCLOBENZAPRINE HCL 10 MG
TABLET ORAL
Refills: 0 | COMMUNITY
Start: 2017-10-03 | End: 2017-12-18 | Stop reason: ALTCHOICE

## 2017-10-05 RX ORDER — IBUPROFEN 600 MG/1
TABLET ORAL
Refills: 0 | COMMUNITY
Start: 2017-10-03 | End: 2018-04-19

## 2017-10-05 RX ORDER — LAMOTRIGINE 25 MG/1
TABLET ORAL
Qty: 60 TAB | Refills: 0 | Status: SHIPPED | OUTPATIENT
Start: 2017-10-05 | End: 2017-10-30 | Stop reason: DRUGHIGH

## 2017-10-05 NOTE — MR AVS SNAPSHOT
Visit Information Date & Time Provider Department Dept. Phone Encounter #  
 10/5/2017  3:15 PM Tri Singer MD [address] [facility] [phone] [id number] Follow-up Instructions Return in about 3 weeks (around 10/26/2017). Your Appointments 10/10/2017 11:30 AM  
COUNSELING with RHEA James ([facility]) Appt Note:   
 [address] [facility] [address] [place] [address]  
[phone] [address] [facility] [address]  
  
    
 10/31/2017  2:30 PM  
COUNSELING with RHEA James ([facility]) Appt Note:   
 [address] [place] [address]  
[phone] Upcoming Health Maintenance Date Due Hepatitis C Screening 1955 DTaP/Tdap/Td series (1 - Tdap) 1/28/1975 PAP AKA CERVICAL CYTOLOGY 1/28/1975 FOBT Q 1 YEAR AGE 50-75 1/28/2004 BREAST CANCER SCRN MAMMOGRAM 7/7/2011 ZOSTER VACCINE AGE 60> 11/28/2013 INFLUENZA AGE 9 TO ADULT 8/1/2017 Allergies as of 10/5/2017  Review Complete On: 10/5/2017 By: Tri Singer MD  
  
 Severity Noted Reaction Type Reactions Augmentin [Amoxicillin-pot Clavulanate]  07/22/2016    Diarrhea, Nausea and Vomiting Darvocet A500 [Propoxyphene N-acetaminophen]  11/10/2010    Other (comments) Heart flutters Doxycycline  11/10/2010    Other (comments) Stomach pain and diarrhea. Has taken since without problems. Gentamicin  07/22/2016    Swelling Percocet [Oxycodone-acetaminophen]  11/10/2010    Rash, Itching Stelazine  03/10/2011    Swelling Tongue swelling Sulfa (Sulfonamide Antibiotics)  11/10/2010    Hives Sumycin [Tetracycline]  03/10/2011    Other (comments) Tongue tingling Zegerid [Omeprazole-sodium Bicarbonate]  11/10/2010    Unknown (comments) Takes nexium without problems, but if she takes zegerid, she has nausea and sweating. Current Immunizations  Never Reviewed No immunizations on file. Not reviewed this visit You Were Diagnosed With   
  
 Codes Comments Bipolar depression ([facility]    -  Primary ICD-10-CM: F31.30 ICD-9-CM: 296.50 Anxiety disorder, unspecified type     ICD-10-CM: F41.9 ICD-9-CM: 300.00 Insomnia, unspecified type     ICD-10-CM: G47.00 ICD-9-CM: 780.52 Vitals BP Pulse Height(growth percentile) Weight(growth percentile) BMI OB Status 174/77 88 5' 2\" (1.575 m) 174 lb (78.9 kg) 31.83 kg/m2 Hysterectomy Smoking Status Former Smoker Vitals History BMI and BSA Data Body Mass Index Body Surface Area  
 31.83 kg/m 2 1.86 m 2 Preferred Pharmacy Pharmacy Name Phone [facility] [address], [address] [place] [address]  [address] [phone] Your Updated Medication List  
  
   
This list is accurate as of: 10/5/17  3:26 PM.  Always use your most recent med list.  
  
  
  
  
 ASPIRIN PO Take 81 mg by mouth daily. CARTIA  mg ER capsule Generic drug:  dilTIAZem CD Take 240 mg by mouth daily. cetirizine 10 mg tablet Commonly known as:  ZYRTEC Take 10 mg by mouth nightly. cyclobenzaprine 10 mg tablet Commonly known as:  FLEXERIL  
TAKE 1 TABLET EVERY 8 HOURS IF NEEDED FOR MUSCLE SPASMS  
  
 estradiol 10 mcg Tab vaginal tablet Commonly known as:  [place]al [place] Insert 10 mcg into vagina two (2) days a week. fenofibrate 160 mg tablet Commonly known as:  LOFIBRA Take 1 Tab by mouth nightly. glipiZIDE SR 5 mg CR tablet Commonly known as:  GLUCOTROL XL Take 5 mg by mouth daily. ibuprofen 600 mg tablet Commonly known as:  MOTRIN  
TAKE 1 TABLET EVERY 8 HOURS WITH FOOD IF NEEDED FOR PAIN  
  
 lamoTRIgine 25 mg tablet Commonly known as:   LaMICtal  
Take 1 tablet twice a day for 15 days, then take 1 tablet in the morning and 2 tablets at night  Indications: Bipolar disorder LORazepam 1 mg tablet Commonly known as:  ATIVAN Take 1 Tab by mouth two (2) times daily as needed for Anxiety. Max Daily Amount: 2 mg. Indications: anxiety  
  
 metFORMIN  mg tablet Commonly known as:  GLUCOPHAGE XR NexIUM 20 mg capsule Generic drug:  esomeprazole Take 40 mg by mouth daily. ondansetron hcl 8 mg tablet Commonly known as:  ZOFRAN  
TAKE 1 TABLET BY MOUTH EVERY 8 HOURS AS NEEDED FOR NAUSEA AND/OR VOMITING. MAY CAUSE CONSTIPATION AND HEADACHE  
  
 raloxifene 60 mg tablet Commonly known as:  EVISTA Take 60 mg by mouth daily. valACYclovir 1 gram tablet Commonly known as:  VALTREX Take 1,000 mg by mouth as needed. VITAMIN D3 1,000 unit tablet Generic drug:  cholecalciferol Take 1,000 Units by mouth daily. vortioxetine 10 mg tablet Commonly known as:  TRINTELLIX Take 1/2 tablet daily for 7 days and then stop.  
  
 zaleplon 5 mg capsule Commonly known as:  SONATA Take 5 mg by mouth nightly as needed. Prescriptions Printed Refills LORazepam (ATIVAN) 1 mg tablet 0 Sig: Take 1 Tab by mouth two (2) times daily as needed for Anxiety. Max Daily Amount: 2 mg. Indications: anxiety Class: Print Route: Oral  
  
Prescriptions Sent to Pharmacy Refills  
 lamoTRIgine (LAMICTAL) 25 mg tablet 0 Sig: Take 1 tablet twice a day for 15 days, then take 1 tablet in the morning and 2 tablets at night  Indications: Bipolar disorder Class: Normal  
 Pharmacy: [facility] [address] [place] [address] #: [phone] Follow-up Instructions Return in about 3 weeks (around 10/26/2017). Introducing [facility]! [facility] introduces [employer] patient portal. Now you can access parts of your medical record, email your doctor's office, and request medication refills online. 1. In your internet browser, go to [email or web address] 2. Click on the First Time User? Click Here link in the Sign In box. You will see the New Member Sign Up page. 3. Enter your StreamLine Call Access Code exactly as it appears below. You will not need to use this code after youve completed the sign-up process. If you do not sign up before the expiration date, you must request a new code. · StreamLine Call Access Code: [email or web address] Expires: 10/26/2017 10:26 AM 
 
4. Enter the last four digits of your Social Security Number (xxxx) and Date of Birth (mm/dd/yyyy) as indicated and click Submit. You will be taken to the next sign-up page. 5. Create a [employer] ID. This will be your StreamLine Call login ID and cannot be changed, so think of one that is secure and easy to remember. 6. Create a StreamLine Call password. You can change your password at any time. 7. Enter your Password Reset Question and Answer. This can be used at a later time if you forget your password. 8. Enter your e-mail address. You will receive e-mail notification when new information is available in [address] 9. Click Sign Up. You can now view and download portions of your medical record. 10. Click the Download Summary menu link to download a portable copy of your medical information. If you have questions, please visit the Frequently Asked Questions section of the StreamLine Call website. Remember, StreamLine Call is NOT to be used for urgent needs. For medical emergencies, dial 911. Now available from your iPhone and Android! Please provide this summary of care documentation to your next provider. Your primary care clinician is listed as Juan M Nguyễn. If you have any questions after today's visit, please call [phone].

## 2017-10-06 NOTE — PROGRESS NOTES
Psychiatric Progress Note    Date: 10/5/2017  Account Number:  [id number]  Name: Sussy Hoyos    Length of psychotherapy session: 17 minutes     Total Patient Care Time Spent: 25 minutes : (Coordinated care:  counseling time with patient, individual psychotherapy with patient; discussions with family members and chart review). SUBJECTIVE:   Sussy Hoyos  is a 60 y.o.  female  patient presents for a therapy/psychopharmacological management appointment. Pt reports being upset as has ongoing GI symptoms. States her anger and rage are now changed into crying spells. She continues to feel depressed, anxious, can't stop crying, remains pessimistic and unhappy for not getting a quick fix of her symptoms. She is attending psychotherapy with Shaye Soriano. Discussed with pt the chronicity of her symptoms, and the need for slow titration of her medicine. Patient denies SI/HI/SIB. No evidence of AH/VH or delusions.       Appetite:no change from normal   Sleep: no change     Response to Treatment: some improvement   Side Effects: none      Supportive/Cognitive/Reality-Oriented psychotherapy provided in regards to psychosocial stressors:   pre-admission and current problems   Housing issues   Occupational issues   Academic issues   Legal issues   Medical issues   Interpersonal conflicts   Stress of hospitalization  Psychoeducation provided  Treatment plan reviewed with patient-including diagnosis and medications  Worked on issues of denial & effects of substance dependency/use      OBJECTIVE:                 Mental Status exam: WNL except for      Sensorium  oriented to time, place and person   Relations cooperative, passive    Eye Contact    fair to poor   Appearance:  age appropriate, casually dressed and obese   Motor Behavior:  within normal limits   Speech:  normal pitch and normal volume   Thought Process: goal directed   Thought Content free of delusions and free of hallucinations   Suicidal ideations none   Homicidal ideations none Mood:  depressed   Affect:  constricted and teary, then smiled   Memory recent  adequate   Memory remote:  adequate   Concentration:  adequate   Abstraction:  abstract   Insight:  fair   Reliability fair   Judgment:  fair       Allergies   Allergen Reactions    Augmentin [Amoxicillin-Pot Clavulanate] Diarrhea and Nausea and Vomiting    Darvocet A500 [Propoxyphene N-Acetaminophen] Other (comments)     Heart flutters    Doxycycline Other (comments)     Stomach pain and diarrhea. Has taken since without problems.  Gentamicin Swelling    Percocet [Oxycodone-Acetaminophen] Rash and Itching    Stelazine Swelling     Tongue swelling    Sulfa (Sulfonamide Antibiotics) Hives    Sumycin [Tetracycline] Other (comments)     Tongue tingling    Zegerid [Omeprazole-Sodium Bicarbonate] Unknown (comments)     Takes nexium without problems, but if she takes zegerid, she has nausea and sweating. Current Outpatient Prescriptions   Medication Sig Dispense Refill    ibuprofen (MOTRIN) 600 mg tablet TAKE 1 TABLET EVERY 8 HOURS WITH FOOD IF NEEDED FOR PAIN  0    LORazepam (ATIVAN) 1 mg tablet Take 1 Tab by mouth two (2) times daily as needed for Anxiety. Max Daily Amount: 2 mg. Indications: anxiety 60 Tab 0    lamoTRIgine (LAMICTAL) 25 mg tablet Take 1 tablet twice a day for 15 days, then take 1 tablet in the morning and 2 tablets at night  Indications: Bipolar disorder 60 Tab 0    glipiZIDE SR (GLUCOTROL XL) 5 mg CR tablet Take 5 mg by mouth daily.  metFORMIN ER (GLUCOPHAGE XR) 500 mg tablet       ondansetron hcl (ZOFRAN) 8 mg tablet TAKE 1 TABLET BY MOUTH EVERY 8 HOURS AS NEEDED FOR NAUSEA AND/OR VOMITING. MAY CAUSE CONSTIPATION AND HEADACHE  0    diltiazem CD (CARTIA XT) 240 mg ER capsule Take 240 mg by mouth daily.  ASPIRIN PO Take 81 mg by mouth daily.  esomeprazole (NEXIUM) 20 mg capsule Take 40 mg by mouth daily.       cholecalciferol (VITAMIN D3) 1,000 unit tablet Take 1,000 Units by mouth daily.      estradiol (VAGIFEM) 10 mcg tab vaginal tablet Insert 10 mcg into vagina two (2) days a week.  zaleplon (SONATA) 5 mg capsule Take 5 mg by mouth nightly as needed.  cetirizine (ZYRTEC) 10 mg tablet Take 10 mg by mouth nightly.  raloxifene (EVISTA) 60 mg tablet Take 60 mg by mouth daily.  fenofibrate 160 mg Tab Take 1 Tab by mouth nightly.  valacyclovir (VALTREX) 1 g tablet Take 1,000 mg by mouth as needed.  cyclobenzaprine (FLEXERIL) 10 mg tablet TAKE 1 TABLET EVERY 8 HOURS IF NEEDED FOR MUSCLE SPASMS  0    vortioxetine (TRINTELLIX) 10 mg tablet Take 1/2 tablet daily for 7 days and then stop. 7 Tab 0        Medication Changes/Adjustments:   Medications Discontinued During This Encounter   Medication Reason    IBUPROFEN (ADVIL PO) Duplicate Order    clonazePAM (KLONOPIN) 1 mg tablet Not A Current Medication    lamoTRIgine (LAMICTAL) 25 mg tablet Dose Adjustment          ASSESSMENT:  Aguila Mckinney  is a 60 y.o.  female patient presented for her f/u appointment. Pt continues to stay depressed and having crying spells, states she is no longer angry or in rage, will continue to titrate up the dose of her Lamictal.     Diagnoses:   Axis I:  Major Depressive disorder vs Bipolar Depression             Anxiety disorder             Insomnia   Axis II: Personality disorder NOS  Axis III: As noted above  Axis IV:Moderate to Severe  Axis V: 55-60    RECOMMENDATIONS/PLAN:   1. Medications: Medications reviewed, pt is still on one tablet of Lamictal, will increase the dose to Lamictal 25mg BID, then 25mg in the morning and 50mg at night, discontinue Klonopin and start pt on Ativan 1mg BID PRN. Orders Placed This Encounter    cyclobenzaprine (FLEXERIL) 10 mg tablet    ibuprofen (MOTRIN) 600 mg tablet    LORazepam (ATIVAN) 1 mg tablet    lamoTRIgine (LAMICTAL) 25 mg tablet      2. Follow-up Disposition:  Return in about 3 weeks (around 10/26/2017).

## 2017-10-10 ENCOUNTER — OFFICE VISIT (OUTPATIENT)
Dept: BEHAVIORAL/MENTAL HEALTH CLINIC | Age: 62
End: 2017-10-10

## 2017-10-10 DIAGNOSIS — F32.2 SEVERE MAJOR DEPRESSION WITHOUT PSYCHOTIC FEATURES (HCC): Primary | ICD-10-CM

## 2017-10-10 DIAGNOSIS — F41.1 GENERALIZED ANXIETY DISORDER: ICD-10-CM

## 2017-10-11 NOTE — PROGRESS NOTES
PSYCHOTHERAPY NOTE      Yumiko Gao is a 60 y.o. female who presents with depression and anxiety. Client was seen for an Individual Therapy session that lasted for 50 minutes. Focus of session/Issues addressed:  Phill Tarango has not been feeling well with GI issues. PCP is testing for celiac disease. This prevented her from going to a nephew's birthday party. Shared feelings about his brother, concerns about his health issues, and her feelings of guilt and regret. Explored coping strategies. Continues to cry constantly; not as angry, but very depressed - remains pessimistic and unhappy. Encouraged her to work on another CBT chapter on Uncovering Automatic Thoughts. Personalizes interactions in a negative light. Hopeless. This therapist inquired about daughter's health, which had been a concern in last session. There was good news to report which had been overlooked in her preoccupation with negative thoughts. Social History     Social History Narrative    Phill Tarango, 61 yo  woman, has been  to [address], for 44 years, with a 2 year separation . Reports poor marital communication with Radha Franks, who is a \"workaholic\" who is annoyed with her. 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 They have two daughters: Pj Taveras, 8, and Kevin, 3. She has a poor relationship with her daughter-in-law and son. Phill Tarango worked for the [employer] and retired 3 years ago to care for her mother, Jaycee Aguilar, who  1 year ago at 80, a strong woman who suffered from depression.   Her father, Seymour Boyd,  from a stroke at 80 about 10 years ago. 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 She reports a childhood with no abuse or neglect; she was a shy, quiet child with few friends, dated little and  the first man who seemed to find her worthy. She cares for her daughter's children some, but is finding this too much recently. Mental Status exam:         Sensorium  Alert and Oriented x 2   Relations cooperative   Appearance:  casually dressed and overweight   Motor Behavior:  within normal limits   Speech:  normal pitch and normal volume   Thought Process: logical   Thought Content free of delusions and free of hallucinations   Suicidal ideations none   Homicidal ideations none   Mood:  anxious and depressed   Affect:  euthymic and tearful, irritable   Memory recent  adequate   Memory remote:  adequate   Concentration:  adequate   Abstraction:  abstract   Insight:  good   Reliability good   Judgment:  good         DIAGNOSIS AND IMPRESSION:    Current Diagnosis:       ICD-10-CM ICD-9-CM    1. Severe major depression without psychotic features ([facility] F32.2 296.23    2. Generalized anxiety disorder F41.1 300.02      Strengths:  [place] system      Identified goals for therapy are  Decrease anger  Decrease depression  Decrease anxiety    Clinical assignments:   CBT-Uncovering Automatic Thoughts  Breathing exercises    Interventions/plans:    Supportive/Cognitive/Reality-Oriented psychotherapy provided in regards to psychosocial stressors and current problems. anxiety, depression, anger management, concern about health problems, difficulty sleeping and relationship difficulties  Psychoeducation provided.   Treatment plan reviewed with patient-including diagnosis and medications      Sunni Gautam LCSW

## 2017-10-30 ENCOUNTER — OFFICE VISIT (OUTPATIENT)
Dept: BEHAVIORAL/MENTAL HEALTH CLINIC | Age: 62
End: 2017-10-30

## 2017-10-30 VITALS
DIASTOLIC BLOOD PRESSURE: 81 MMHG | HEART RATE: 86 BPM | WEIGHT: 171 LBS | HEIGHT: 62 IN | SYSTOLIC BLOOD PRESSURE: 174 MMHG | BODY MASS INDEX: 31.47 KG/M2

## 2017-10-30 DIAGNOSIS — F41.9 ANXIETY DISORDER, UNSPECIFIED TYPE: ICD-10-CM

## 2017-10-30 DIAGNOSIS — G47.00 INSOMNIA, UNSPECIFIED TYPE: ICD-10-CM

## 2017-10-30 DIAGNOSIS — F31.9 BIPOLAR DEPRESSION (HCC): Primary | ICD-10-CM

## 2017-10-30 RX ORDER — CLOBETASOL PROPIONATE 0.5 MG/G
CREAM TOPICAL
COMMUNITY
Start: 2017-10-09 | End: 2018-04-19

## 2017-10-30 RX ORDER — ZALEPLON 5 MG/1
5 CAPSULE ORAL
Qty: 30 CAP | Status: CANCELLED | OUTPATIENT
Start: 2017-10-30

## 2017-10-30 RX ORDER — LAMOTRIGINE 25 MG/1
25 TABLET ORAL DAILY
Qty: 30 TAB | Refills: 0 | Status: SHIPPED | OUTPATIENT
Start: 2017-11-05 | End: 2017-11-20 | Stop reason: DRUGHIGH

## 2017-10-30 RX ORDER — LORAZEPAM 1 MG/1
1 TABLET ORAL
Qty: 60 TAB | Refills: 0 | Status: SHIPPED | OUTPATIENT
Start: 2017-10-30 | End: 2017-11-20 | Stop reason: SDUPTHER

## 2017-10-30 RX ORDER — LAMOTRIGINE 100 MG/1
100 TABLET ORAL
Qty: 30 TAB | Refills: 0 | Status: SHIPPED | OUTPATIENT
Start: 2017-10-30 | End: 2017-11-20 | Stop reason: DRUGHIGH

## 2017-10-30 RX ORDER — DICYCLOMINE HYDROCHLORIDE 10 MG/1
CAPSULE ORAL
Refills: 0 | COMMUNITY
Start: 2017-10-06 | End: 2017-12-18 | Stop reason: ALTCHOICE

## 2017-10-30 NOTE — MR AVS SNAPSHOT
Visit Information Date & Time Provider Department Dept. Phone Encounter #  
 10/30/2017  1:15 PM Carlyn Rowley MD [id number] [facility] [phone] [id number] Follow-up Instructions Return in about 3 weeks (around 11/20/2017). Your Appointments 11/8/2017  1:30 PM  
COUNSELING with RHEA MenonElvira Marcano 5 ([address]) Appt Note: fu; f/u  
 [address] [facility] [address] [place] [address]  
[phone] [address] [place] [address] [address] Upcoming Health Maintenance Date Due Hepatitis C Screening 1955 DTaP/Tdap/Td series (1 - Tdap) 1/28/1975 PAP AKA CERVICAL CYTOLOGY 1/28/1975 FOBT Q 1 YEAR AGE 50-75 1/28/2004 BREAST CANCER SCRN MAMMOGRAM 7/7/2011 ZOSTER VACCINE AGE 60> 11/28/2013 INFLUENZA AGE 9 TO ADULT 8/1/2017 Allergies as of 10/30/2017  Review Complete On: 10/30/2017 By: Carlyn Rowley MD  
  
 Severity Noted Reaction Type Reactions Augmentin [Amoxicillin-pot Clavulanate]  07/22/2016    Diarrhea, Nausea and Vomiting Darvocet A500 [Propoxyphene N-acetaminophen]  11/10/2010    Other (comments) Heart flutters Doxycycline  11/10/2010    Other (comments) Stomach pain and diarrhea. Has taken since without problems. Gentamicin  07/22/2016    Swelling Percocet [Oxycodone-acetaminophen]  11/10/2010    Rash, Itching Stelazine  03/10/2011    Swelling Tongue swelling Sulfa (Sulfonamide Antibiotics)  11/10/2010    Hives Sumycin [Tetracycline]  03/10/2011    Other (comments) Tongue tingling Zegerid [Omeprazole-sodium Bicarbonate]  11/10/2010    Unknown (comments) Takes nexium without problems, but if she takes zegerid, she has nausea and sweating. Current Immunizations  Never Reviewed No immunizations on file. Not reviewed this visit You Were Diagnosed With   
  
 Codes Comments Bipolar depression ([facility]    -  Primary ICD-10-CM: F31.30 ICD-9-CM: 296.50 Anxiety disorder, unspecified type     ICD-10-CM: F41.9 ICD-9-CM: 300.00 Insomnia, unspecified type     ICD-10-CM: G47.00 ICD-9-CM: 780.52 Vitals BP Pulse Height(growth percentile) Weight(growth percentile) BMI OB Status 174/81 86 5' 2\" (1.575 m) 171 lb (77.6 kg) 31.28 kg/m2 Hysterectomy Smoking Status Former Smoker Vitals History BMI and BSA Data Body Mass Index Body Surface Area  
 31.28 kg/m 2 1.84 m 2 [facility] [address] [place] [address] [address], [address] [facility] [place] [phone] Your Updated Medication List  
  
   
This list is accurate as of: 10/30/17  1:44 PM.  Always use your most recent med list.  
  
  
  
  
 ASPIRIN PO Take 81 mg by mouth daily. CARTIA  mg ER capsule Generic drug:  dilTIAZem CD Take 240 mg by mouth daily. cetirizine 10 mg tablet Commonly known as:  ZYRTEC Take 10 mg by mouth nightly. clobetasol 0.05 % topical cream  
Commonly known as:  TEMOVATE  
  
 cyclobenzaprine 10 mg tablet Commonly known as:  FLEXERIL  
TAKE 1 TABLET EVERY 8 HOURS IF NEEDED FOR MUSCLE SPASMS  
  
 dicyclomine 10 mg capsule Commonly known as:  BENTYL TAKE 1 CAPSULE(S) BY MOUTH THREE TIMES DAILY AS NEEDED AS NEEDED FOR ABDOMINAL CRAMPING  
  
 estradiol 10 mcg Tab vaginal tablet Commonly known as:  [place] Insert 10 mcg into vagina two (2) days a week. fenofibrate 160 mg tablet Commonly known as:  LOFIBRA Take 1 Tab by mouth nightly. glipiZIDE SR 5 mg CR tablet Commonly known as:  GLUCOTROL XL Take 5 mg by mouth daily. ibuprofen 600 mg tablet Commonly known as:  MOTRIN  
TAKE 1 TABLET EVERY 8 HOURS WITH FOOD IF NEEDED FOR PAIN  
  
 * lamoTRIgine 100 mg tablet Commonly known as:   LaMICtal  
 Take 1 Tab by mouth nightly. Indications: bipolar depression * lamoTRIgine 25 mg tablet Commonly known as: LaMICtal  
Take 1 Tab by mouth daily for 30 days. Indications: Bipolar depression Start taking on:  11/5/2017 LORazepam 1 mg tablet Commonly known as:  ATIVAN Take 1 Tab by mouth two (2) times daily as needed for Anxiety. Max Daily Amount: 2 mg. Indications: anxiety  
  
 metFORMIN  mg tablet Commonly known as:  GLUCOPHAGE XR NexIUM 20 mg capsule Generic drug:  esomeprazole Take 40 mg by mouth daily. ondansetron hcl 8 mg tablet Commonly known as:  ZOFRAN  
TAKE 1 TABLET BY MOUTH EVERY 8 HOURS AS NEEDED FOR NAUSEA AND/OR VOMITING. MAY CAUSE CONSTIPATION AND HEADACHE  
  
 raloxifene 60 mg tablet Commonly known as:  EVISTA Take 60 mg by mouth daily. valACYclovir 1 gram tablet Commonly known as:  VALTREX Take 1,000 mg by mouth as needed. VITAMIN D3 1,000 unit tablet Generic drug:  cholecalciferol Take 1,000 Units by mouth daily. zaleplon 5 mg capsule Commonly known as:  SONATA Take 5 mg by mouth nightly as needed. * Notice: This list has 2 medication(s) that are the same as other medications prescribed for you. Read the directions carefully, and ask your doctor or other care provider to review them with you. Prescriptions Printed Refills LORazepam (ATIVAN) 1 mg tablet 0 Sig: Take 1 Tab by mouth two (2) times daily as needed for Anxiety. Max Daily Amount: 2 mg. Indications: anxiety Class: Print Route: Oral  
  
Prescriptions Sent to Pharmacy Refills  
 lamoTRIgine (LAMICTAL) 100 mg tablet 0 Sig: Take 1 Tab by mouth nightly. Indications: bipolar depression Class: Normal  
 Pharmacy: [address] [place], [address]  [address] Ph #: [phone] Route: Oral  
 lamoTRIgine (LAMICTAL) 25 mg tablet 0 Starting on: 11/5/2017 Sig: Take 1 Tab by mouth daily for 30 days. Indications: Bipolar depression Class: Normal  
 Pharmacy: Haritha Leslie [address], [address] [place] [address] Channing Gautam  #: [phone] Route: Oral  
  
Follow-up Instructions Return in about 3 weeks (around 11/20/2017). Introducing [facility] SERVICES! Esteban Turner introduces [employer] patient portal. Now you can access parts of your medical record, email your doctor's office, and request medication refills online. 1. In your internet browser, go to [email or web address]. [employer] 2. Click on the First Time User? Click Here link in the Sign In box. You will see the New Member Sign Up page. 3. Enter your [employer] Access Code exactly as it appears below. You will not need to use this code after youve completed the sign-up process. If you do not sign up before the expiration date, you must request a new code. · [employer] Access Code: [email or web address] Expires: 1/28/2018  1:40 PM 
 
4. Enter the last four digits of your Social Security Number (xxxx) and Date of Birth (mm/dd/yyyy) as indicated and click Submit. You will be taken to the next sign-up page. 5. Create a [employer] ID. This will be your [employer] login ID and cannot be changed, so think of one that is secure and easy to remember. 6. Create a [employer] password. You can change your password at any time. 7. Enter your Password Reset Question and Answer. This can be used at a later time if you forget your password. 8. Enter your e-mail address. You will receive e-mail notification when new information is available in [address] 9. Click Sign Up. You can now view and download portions of your medical record. 10. Click the Download Summary menu link to download a portable copy of your medical information. If you have questions, please visit the Frequently Asked Questions section of the [employer] website.  Remember, [employer] is NOT to be used for urgent needs. For medical emergencies, dial 911. Now available from your iPhone and Android! Please provide this summary of care documentation to your next provider. Your primary care clinician is listed as Yissel Morales. If you have any questions after today's visit, please call [phone].

## 2017-10-30 NOTE — PROGRESS NOTES
Psychiatric Progress Note    Date: 10/30/2017  Account Number:  [id number]  Name: Aguila Mckinney    Length of psychotherapy session: 15 minutes     Total Patient Care Time Spent: 20 minutes : (Coordinated care:  counseling time with patient, individual psychotherapy with patient; discussions with family members and chart review). SUBJECTIVE:   Aguila Mckinney  is a 60 y.o.  female  patient presents for a therapy/psychopharmacological management appointment. Pt reports not doing as well as before. States for the last one week she has been feeling shaky, heart pounding, angry, crying a lot, feeling hopeless. She is worndering why her mood changed although she was doing better the previous week. Discussed the need for pt to continue psychotherapy along with medications to help with her symptoms. Also recommended medication adjustment. Patient denies SI/HI/SIB. No evidence of AH/VH or delusions.       Appetite:no change from normal   Sleep: no change     Response to Treatment: some improvement   Side Effects: none      Supportive/Cognitive/Reality-Oriented psychotherapy provided in regards to psychosocial stressors:   pre-admission and current problems   Housing issues   Occupational issues   Academic issues   Legal issues   Medical issues   Interpersonal conflicts   Stress of hospitalization  Psychoeducation provided  Treatment plan reviewed with patient-including diagnosis and medications  Worked on issues of denial & effects of substance dependency/use      OBJECTIVE:                 Mental Status exam: WNL except for      Sensorium  oriented to time, place and person   Relations cooperative, passive    Eye Contact    fair to poor   Appearance:  age appropriate, casually dressed and obese   Motor Behavior:  within normal limits   Speech:  normal pitch and normal volume   Thought Process: goal directed   Thought Content free of delusions and free of hallucinations   Suicidal ideations none   Homicidal ideations none   Mood: Depressed and anxious   Affect:  constricted    Memory recent  adequate   Memory remote:  adequate   Concentration:  adequate   Abstraction:  abstract   Insight:  fair   Reliability fair   Judgment:  fair       Allergies   Allergen Reactions    Augmentin [Amoxicillin-Pot Clavulanate] Diarrhea and Nausea and Vomiting    Darvocet A500 [Propoxyphene N-Acetaminophen] Other (comments)     Heart flutters    Doxycycline Other (comments)     Stomach pain and diarrhea. Has taken since without problems.  Gentamicin Swelling    Percocet [Oxycodone-Acetaminophen] Rash and Itching    Stelazine Swelling     Tongue swelling    Sulfa (Sulfonamide Antibiotics) Hives    Sumycin [Tetracycline] Other (comments)     Tongue tingling    Zegerid [Omeprazole-Sodium Bicarbonate] Unknown (comments)     Takes nexium without problems, but if she takes zegerid, she has nausea and sweating. Current Outpatient Prescriptions   Medication Sig Dispense Refill    clobetasol (TEMOVATE) 0.05 % topical cream       lamoTRIgine (LAMICTAL) 100 mg tablet Take 1 Tab by mouth nightly. Indications: bipolar depression 30 Tab 0    [START ON 11/5/2017] lamoTRIgine (LAMICTAL) 25 mg tablet Take 1 Tab by mouth daily for 30 days. Indications: Bipolar depression 30 Tab 0    LORazepam (ATIVAN) 1 mg tablet Take 1 Tab by mouth two (2) times daily as needed for Anxiety. Max Daily Amount: 2 mg. Indications: anxiety 60 Tab 0    ibuprofen (MOTRIN) 600 mg tablet TAKE 1 TABLET EVERY 8 HOURS WITH FOOD IF NEEDED FOR PAIN  0    glipiZIDE SR (GLUCOTROL XL) 5 mg CR tablet Take 5 mg by mouth daily.  metFORMIN ER (GLUCOPHAGE XR) 500 mg tablet       diltiazem CD (CARTIA XT) 240 mg ER capsule Take 240 mg by mouth daily.  ASPIRIN PO Take 81 mg by mouth daily.  esomeprazole (NEXIUM) 20 mg capsule Take 40 mg by mouth daily.  cholecalciferol (VITAMIN D3) 1,000 unit tablet Take 1,000 Units by mouth daily.       estradiol (VAGIFEM) 10 mcg tab vaginal tablet Insert 10 mcg into vagina two (2) days a week.  zaleplon (SONATA) 5 mg capsule Take 5 mg by mouth nightly as needed.  cetirizine (ZYRTEC) 10 mg tablet Take 10 mg by mouth nightly.  raloxifene (EVISTA) 60 mg tablet Take 60 mg by mouth daily.  fenofibrate 160 mg Tab Take 1 Tab by mouth nightly.  valacyclovir (VALTREX) 1 g tablet Take 1,000 mg by mouth as needed.  dicyclomine (BENTYL) 10 mg capsule TAKE 1 CAPSULE(S) BY MOUTH THREE TIMES DAILY AS NEEDED AS NEEDED FOR ABDOMINAL CRAMPING  0    cyclobenzaprine (FLEXERIL) 10 mg tablet TAKE 1 TABLET EVERY 8 HOURS IF NEEDED FOR MUSCLE SPASMS  0    ondansetron hcl (ZOFRAN) 8 mg tablet TAKE 1 TABLET BY MOUTH EVERY 8 HOURS AS NEEDED FOR NAUSEA AND/OR VOMITING. MAY CAUSE CONSTIPATION AND HEADACHE  0        Medication Changes/Adjustments:   Medications Discontinued During This Encounter   Medication Reason    vortioxetine (TRINTELLIX) 10 mg tablet Not A Current Medication    lamoTRIgine (LAMICTAL) 25 mg tablet Dose Adjustment    LORazepam (ATIVAN) 1 mg tablet Reorder          ASSESSMENT:  Juliet Madden  is a 60 y.o.  female patient presented for her f/u appointment. Pt did well for short period of time but for the last one week she started feeling depressed again, has crying spells, feels anxious. Diagnoses:   Axis I:  Major Depressive disorder vs Bipolar Depression             Anxiety disorder             Insomnia   Axis II: Personality disorder NOS  Axis III: As noted above  Axis IV:Moderate to Severe  Axis V: 55-60    RECOMMENDATIONS/PLAN:   1. Medications: Medications reviewed, will titrate up the dose of Lamictal, continue rest of the meds as such. Orders Placed This Encounter    clobetasol (TEMOVATE) 0.05 % topical cream    dicyclomine (BENTYL) 10 mg capsule    lamoTRIgine (LAMICTAL) 100 mg tablet    lamoTRIgine (LAMICTAL) 25 mg tablet    LORazepam (ATIVAN) 1 mg tablet      2.   Follow-up Disposition:  Return in about 3 weeks (around 11/20/2017).

## 2017-11-20 ENCOUNTER — OFFICE VISIT (OUTPATIENT)
Dept: BEHAVIORAL/MENTAL HEALTH CLINIC | Age: 62
End: 2017-11-20

## 2017-11-20 VITALS
HEART RATE: 85 BPM | DIASTOLIC BLOOD PRESSURE: 77 MMHG | SYSTOLIC BLOOD PRESSURE: 168 MMHG | HEIGHT: 62 IN | BODY MASS INDEX: 31.65 KG/M2 | WEIGHT: 172 LBS

## 2017-11-20 DIAGNOSIS — G47.00 INSOMNIA, UNSPECIFIED TYPE: ICD-10-CM

## 2017-11-20 DIAGNOSIS — F41.9 ANXIETY DISORDER, UNSPECIFIED TYPE: ICD-10-CM

## 2017-11-20 DIAGNOSIS — F39 MOOD DISORDER (HCC): Primary | ICD-10-CM

## 2017-11-20 RX ORDER — LORAZEPAM 1 MG/1
1 TABLET ORAL
Qty: 60 TAB | Refills: 0 | Status: SHIPPED | OUTPATIENT
Start: 2017-11-20 | End: 2017-12-18

## 2017-11-20 RX ORDER — LEVOCETIRIZINE DIHYDROCHLORIDE 5 MG/1
TABLET, FILM COATED ORAL
COMMUNITY
End: 2018-04-19

## 2017-11-20 RX ORDER — LAMOTRIGINE 100 MG/1
TABLET ORAL
Qty: 45 TAB | Refills: 0 | Status: SHIPPED | OUTPATIENT
Start: 2017-11-20 | End: 2017-12-26 | Stop reason: SDUPTHER

## 2017-11-20 NOTE — MR AVS SNAPSHOT
Visit Information Date & Time Provider Department Dept. Phone Encounter #  
 11/20/2017  1:00 PM Erika Peralta MD [facility] [phone] [id number] Follow-up Instructions Return in about 1 month (around 12/20/2017). Upcoming Health Maintenance Date Due Hepatitis C Screening 1955 DTaP/Tdap/Td series (1 - Tdap) 1/28/1975 PAP AKA CERVICAL CYTOLOGY 1/28/1975 FOBT Q 1 YEAR AGE 50-75 1/28/2004 BREAST CANCER SCRN MAMMOGRAM 7/7/2011 ZOSTER VACCINE AGE 60> 11/28/2013 Influenza Age 5 to Adult 8/1/2017 Allergies as of 11/20/2017  Review Complete On: 11/20/2017 By: Erika Peralta MD  
  
 Severity Noted Reaction Type Reactions Augmentin [Amoxicillin-pot Clavulanate]  07/22/2016    Diarrhea, Nausea and Vomiting Darvocet A500 [Propoxyphene N-acetaminophen]  11/10/2010    Other (comments) Heart flutters Doxycycline  11/10/2010    Other (comments) Stomach pain and diarrhea. Has taken since without problems. Gentamicin  07/22/2016    Swelling Percocet [Oxycodone-acetaminophen]  11/10/2010    Rash, Itching Stelazine  03/10/2011    Swelling Tongue swelling Sulfa (Sulfonamide Antibiotics)  11/10/2010    Hives Sumycin [Tetracycline]  03/10/2011    Other (comments) Tongue tingling Zegerid [Omeprazole-sodium Bicarbonate]  11/10/2010    Unknown (comments) Takes nexium without problems, but if she takes zegerid, she has nausea and sweating. Current Immunizations  Never Reviewed No immunizations on file. Not reviewed this visit You Were Diagnosed With   
  
 Codes Comments Mood disorder ([facility]    -  Primary ICD-10-CM: F39 
ICD-9-CM: 296.90 Anxiety disorder, unspecified type     ICD-10-CM: F41.9 ICD-9-CM: 300.00 Insomnia, unspecified type     ICD-10-CM: G47.00 ICD-9-CM: 780.52 Vitals BP Pulse Height(growth percentile) Weight(growth percentile) BMI OB Status 168/77 85 5' 2\" (1.575 m) 172 lb (78 kg) 31.46 kg/m2 Hysterectomy Smoking Status Former Smoker Vitals History BMI and BSA Data Body Mass Index Body Surface Area  
 31.46 kg/m 2 1.85 m 2 Preferred Pharmacy Pharmacy Name Phone [facility] [address], [address] [place] [address] [place] [phone] Your Updated Medication List  
  
   
This list is accurate as of: 11/20/17  1:57 PM.  Always use your most recent med list.  
  
  
  
  
 ASPIRIN PO Take 81 mg by mouth daily. CARTIA  mg ER capsule Generic drug:  dilTIAZem CD Take 240 mg by mouth daily. clobetasol 0.05 % topical cream  
Commonly known as:  TEMOVATE  
  
 cyclobenzaprine 10 mg tablet Commonly known as:  FLEXERIL  
TAKE 1 TABLET EVERY 8 HOURS IF NEEDED FOR MUSCLE SPASMS  
  
 dicyclomine 10 mg capsule Commonly known as:  BENTYL TAKE 1 CAPSULE(S) BY MOUTH THREE TIMES DAILY AS NEEDED AS NEEDED FOR ABDOMINAL CRAMPING  
  
 estradiol 10 mcg Tab vaginal tablet Commonly known as:  Arty Pila Insert 10 mcg into vagina two (2) days a week. fenofibrate 160 mg tablet Commonly known as:  LOFIBRA Take 1 Tab by mouth nightly. glipiZIDE SR 5 mg CR tablet Commonly known as:  GLUCOTROL XL Take 5 mg by mouth daily. ibuprofen 600 mg tablet Commonly known as:  MOTRIN  
TAKE 1 TABLET EVERY 8 HOURS WITH FOOD IF NEEDED FOR PAIN  
  
 lamoTRIgine 100 mg tablet Commonly known as: LaMICtal  
Take 1/2 tablet in the morning and 1 tablet at night  
  
 levocetirizine 5 mg tablet Commonly known as:  Jacmarcelino Simmering Take  by mouth. LORazepam 1 mg tablet Commonly known as:  ATIVAN Take 1 Tab by mouth two (2) times daily as needed for Anxiety. Max Daily Amount: 2 mg. Indications: anxiety  
  
 metFORMIN  mg tablet Commonly known as:  GLUCOPHAGE XR NexIUM 20 mg capsule Generic drug:  esomeprazole Take 40 mg by mouth daily. ondansetron hcl 8 mg tablet Commonly known as:  ZOFRAN  
TAKE 1 TABLET BY MOUTH EVERY 8 HOURS AS NEEDED FOR NAUSEA AND/OR VOMITING. MAY CAUSE CONSTIPATION AND HEADACHE  
  
 raloxifene 60 mg tablet Commonly known as:  EVISTA Take 60 mg by mouth daily. valACYclovir 1 gram tablet Commonly known as:  VALTREX Take 1,000 mg by mouth as needed. VITAMIN D3 1,000 unit tablet Generic drug:  cholecalciferol Take 1,000 Units by mouth daily. Prescriptions Printed Refills LORazepam (ATIVAN) 1 mg tablet 0 Sig: Take 1 Tab by mouth two (2) times daily as needed for Anxiety. Max Daily Amount: 2 mg. Indications: anxiety Class: Print Route: Oral  
  
Prescriptions Sent to Pharmacy Refills  
 lamoTRIgine (LAMICTAL) 100 mg tablet 0 Sig: Take 1/2 tablet in the morning and 1 tablet at night Class: Normal  
 Pharmacy: [address] #: [phone] Follow-up Instructions Return in about 1 month (around 12/20/2017). Introducing [facility] & [facility]! Татьяна Helms introduces [employer] patient portal. Now you can access parts of your medical record, email your doctor's office, and request medication refills online. 1. In your internet browser, go to [email or web address] 2. Click on the First Time User? Click Here link in the Sign In box. You will see the New Member Sign Up page. 3. Enter your [employer] Access Code exactly as it appears below. You will not need to use this code after youve completed the sign-up process. If you do not sign up before the expiration date, you must request a new code. · [employer] Access Code: [email or web address] Expires: 1/28/2018 12:40 PM 
 
4. Enter the last four digits of your Social Security Number (xxxx) and Date of Birth (mm/dd/yyyy) as indicated and click Submit. You will be taken to the next sign-up page. 5. Create a [employer] [place] ID. This will be your [employer] login ID and cannot be changed, so think of one that is secure and easy to remember. 6. Create a [employer] [place] password. You can change your password at any time. 7. Enter your Password Reset Question and Answer. This can be used at a later time if you forget your password. 8. Enter your e-mail address. You will receive e-mail notification when new information is available in [address] 9. Click Sign Up. You can now view and download portions of your medical record. 10. Click the Download Summary menu link to download a portable copy of your medical information. If you have questions, please visit the Frequently Asked Questions section of the [employer] website. Remember, [employer] is NOT to be used for urgent needs. For medical emergencies, dial 911. Now available from your iPhone and Android! Please provide this summary of care documentation to your next provider. Your primary care clinician is listed as Saul Ross. If you have any questions after today's visit, please call [phone].

## 2017-11-22 NOTE — PROGRESS NOTES
Psychiatric Progress Note    Date: 11/20/2017  Account Number:  [id number]  Name: Nato Miranda    Length of psychotherapy session: 15 minutes     Total Patient Care Time Spent: 20 minutes : (Coordinated care:  counseling time with patient, individual psychotherapy with patient; discussions with family members and chart review). SUBJECTIVE:   Nato Miranda  is a 60 y.o.  female  patient presents for a therapy/psychopharmacological management appointment. Pt reports feeling pretty good though last week she reported feeling angry and cried a lot. She does go through labile mood which seems to be less in intensity now. States she can't just control her mood. She is taking meds as prescribed. Seems to be tolerating the dose adjustment with Lamictal well. Patient denies SI/HI/SIB. No evidence of AH/VH or delusions.       Appetite:no change from normal   Sleep: no change     Response to Treatment: some improvement   Side Effects: none      Supportive/Cognitive/Reality-Oriented psychotherapy provided in regards to psychosocial stressors:   pre-admission and current problems   Housing issues   Occupational issues   Academic issues   Legal issues   Medical issues   Interpersonal conflicts   Stress of hospitalization  Psychoeducation provided  Treatment plan reviewed with patient-including diagnosis and medications  Worked on issues of denial & effects of substance dependency/use      OBJECTIVE:                 Mental Status exam: WNL except for      Sensorium  oriented to time, place and person   Relations cooperative, passive    Eye Contact    fair to poor   Appearance:  age appropriate, casually dressed and obese   Motor Behavior:  within normal limits   Speech:  normal pitch and normal volume   Thought Process: goal directed   Thought Content free of delusions and free of hallucinations   Suicidal ideations none   Homicidal ideations none   Mood:  Good/labile   Affect:  constricted    Memory recent  adequate   Memory remote: adequate   Concentration:  adequate   Abstraction:  abstract   Insight:  Fair/good   Reliability fair/good   Judgment:  Fair/good       Allergies   Allergen Reactions    Augmentin [Amoxicillin-Pot Clavulanate] Diarrhea and Nausea and Vomiting    Darvocet A500 [Propoxyphene N-Acetaminophen] Other (comments)     Heart flutters    Doxycycline Other (comments)     Stomach pain and diarrhea. Has taken since without problems.  Gentamicin Swelling    Percocet [Oxycodone-Acetaminophen] Rash and Itching    Stelazine Swelling     Tongue swelling    Sulfa (Sulfonamide Antibiotics) Hives    Sumycin [Tetracycline] Other (comments)     Tongue tingling    Zegerid [Omeprazole-Sodium Bicarbonate] Unknown (comments)     Takes nexium without problems, but if she takes zegerid, she has nausea and sweating. Current Outpatient Prescriptions   Medication Sig Dispense Refill    levocetirizine (XYZAL) 5 mg tablet Take  by mouth.  LORazepam (ATIVAN) 1 mg tablet Take 1 Tab by mouth two (2) times daily as needed for Anxiety. Max Daily Amount: 2 mg. Indications: anxiety 60 Tab 0    lamoTRIgine (LAMICTAL) 100 mg tablet Take 1/2 tablet in the morning and 1 tablet at night 45 Tab 0    clobetasol (TEMOVATE) 0.05 % topical cream       dicyclomine (BENTYL) 10 mg capsule TAKE 1 CAPSULE(S) BY MOUTH THREE TIMES DAILY AS NEEDED AS NEEDED FOR ABDOMINAL CRAMPING  0    cyclobenzaprine (FLEXERIL) 10 mg tablet TAKE 1 TABLET EVERY 8 HOURS IF NEEDED FOR MUSCLE SPASMS  0    ibuprofen (MOTRIN) 600 mg tablet TAKE 1 TABLET EVERY 8 HOURS WITH FOOD IF NEEDED FOR PAIN  0    glipiZIDE SR (GLUCOTROL XL) 5 mg CR tablet Take 5 mg by mouth daily.  metFORMIN ER (GLUCOPHAGE XR) 500 mg tablet       ondansetron hcl (ZOFRAN) 8 mg tablet TAKE 1 TABLET BY MOUTH EVERY 8 HOURS AS NEEDED FOR NAUSEA AND/OR VOMITING. MAY CAUSE CONSTIPATION AND HEADACHE  0    diltiazem CD (CARTIA XT) 240 mg ER capsule Take 240 mg by mouth daily.       ASPIRIN PO Take 81 mg by mouth daily.  esomeprazole (NEXIUM) 20 mg capsule Take 40 mg by mouth daily.  cholecalciferol (VITAMIN D3) 1,000 unit tablet Take 1,000 Units by mouth daily.  estradiol (VAGIFEM) 10 mcg tab vaginal tablet Insert 10 mcg into vagina two (2) days a week.  raloxifene (EVISTA) 60 mg tablet Take 60 mg by mouth daily.  fenofibrate 160 mg Tab Take 1 Tab by mouth nightly.  valacyclovir (VALTREX) 1 g tablet Take 1,000 mg by mouth as needed. Medication Changes/Adjustments:   Medications Discontinued During This Encounter   Medication Reason    cetirizine (ZYRTEC) 10 mg tablet Discontinued by Another Clinician    zaleplon (SONATA) 5 mg capsule Not A Current Medication    lamoTRIgine (LAMICTAL) 25 mg tablet Dose Adjustment    lamoTRIgine (LAMICTAL) 100 mg tablet Dose Adjustment    LORazepam (ATIVAN) 1 mg tablet Reorder          ASSESSMENT:  Yumiko Gao  is a 60 y.o.  female patient presented for her f/u appointment. Pt is doing better, mood swings still present but less in intensity, will continue to titrate up the dose of Lamictal.      Diagnoses:   Axis I:  Major Depressive disorder vs Bipolar Depression             Anxiety disorder             Insomnia   Axis II: Personality disorder NOS  Axis III: As noted above  Axis IV:Moderate   Axis V: 55-65    RECOMMENDATIONS/PLAN:   1. Medications: Medications reviewed, will titrate up the dose of Lamictal, continue rest of the meds as such. Orders Placed This Encounter    levocetirizine (XYZAL) 5 mg tablet    LORazepam (ATIVAN) 1 mg tablet    lamoTRIgine (LAMICTAL) 100 mg tablet      2. Follow-up Disposition:  Return in about 1 month (around 12/20/2017).

## 2017-12-05 ENCOUNTER — OFFICE VISIT (OUTPATIENT)
Dept: BEHAVIORAL/MENTAL HEALTH CLINIC | Age: 62
End: 2017-12-05

## 2017-12-05 DIAGNOSIS — F33.41 RECURRENT MAJOR DEPRESSIVE DISORDER, IN PARTIAL REMISSION (HCC): Primary | ICD-10-CM

## 2017-12-06 PROBLEM — F33.41 RECURRENT MAJOR DEPRESSIVE DISORDER, IN PARTIAL REMISSION (HCC): Status: ACTIVE | Noted: 2017-12-06
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
## 2017-12-13 ENCOUNTER — TELEPHONE (OUTPATIENT)
Dept: BEHAVIORAL/MENTAL HEALTH CLINIC | Age: 62
End: 2017-12-13

## 2017-12-13 RX ORDER — LAMOTRIGINE 25 MG/1
25 TABLET ORAL
Qty: 30 TAB | Refills: 0 | Status: SHIPPED | OUTPATIENT
Start: 2017-12-13 | End: 2018-01-08 | Stop reason: DRUGHIGH

## 2017-12-18 ENCOUNTER — OFFICE VISIT (OUTPATIENT)
Dept: BEHAVIORAL/MENTAL HEALTH CLINIC | Age: 62
End: 2017-12-18

## 2017-12-18 VITALS
HEIGHT: 62 IN | WEIGHT: 173 LBS | HEART RATE: 85 BPM | BODY MASS INDEX: 31.83 KG/M2 | DIASTOLIC BLOOD PRESSURE: 71 MMHG | SYSTOLIC BLOOD PRESSURE: 180 MMHG

## 2017-12-18 DIAGNOSIS — F39 MOOD DISORDER (HCC): Primary | ICD-10-CM

## 2017-12-18 DIAGNOSIS — F41.9 ANXIETY DISORDER, UNSPECIFIED TYPE: ICD-10-CM

## 2017-12-18 DIAGNOSIS — G47.00 INSOMNIA, UNSPECIFIED TYPE: ICD-10-CM

## 2017-12-18 RX ORDER — ALPRAZOLAM 1 MG/1
1 TABLET ORAL
Qty: 60 TAB | Refills: 0 | Status: SHIPPED | OUTPATIENT
Start: 2017-12-18 | End: 2018-01-08 | Stop reason: DRUGHIGH

## 2017-12-18 NOTE — MR AVS SNAPSHOT
Visit Information Date & Time Provider Department Dept. Phone Encounter #  
 12/18/2017 10:30 AM Lilian Kunz MD [facility] [phone] [id number] Follow-up Instructions Return in about 3 weeks (around 1/8/2018). Upcoming Health Maintenance Date Due Hepatitis C Screening 1955 DTaP/Tdap/Td series (1 - Tdap) 1/28/1975 PAP AKA CERVICAL CYTOLOGY 1/28/1975 FOBT Q 1 YEAR AGE 50-75 1/28/2004 ZOSTER VACCINE AGE 60> 11/28/2013 Influenza Age 5 to Adult 8/1/2017 Allergies as of 12/18/2017  Review Complete On: 12/18/2017 By: Lilian Kunz MD  
  
 Severity Noted Reaction Type Reactions Augmentin [Amoxicillin-pot Clavulanate]  07/22/2016    Diarrhea, Nausea and Vomiting Darvocet A500 [Propoxyphene N-acetaminophen]  11/10/2010    Other (comments) Heart flutters Doxycycline  11/10/2010    Other (comments) Stomach pain and diarrhea. Has taken since without problems. Gentamicin  07/22/2016    Swelling Percocet [Oxycodone-acetaminophen]  11/10/2010    Rash, Itching Stelazine  03/10/2011    Swelling Tongue swelling Sulfa (Sulfonamide Antibiotics)  11/10/2010    Hives Sumycin [Tetracycline]  03/10/2011    Other (comments) Tongue tingling Zegerid [Omeprazole-sodium Bicarbonate]  11/10/2010    Unknown (comments) Takes nexium without problems, but if she takes zegerid, she has nausea and sweating. Current Immunizations  Never Reviewed No immunizations on file. Not reviewed this visit You Were Diagnosed With   
  
 Codes Comments Mood disorder ([facility]    -  Primary ICD-10-CM: F39 
ICD-9-CM: 296.90 Anxiety disorder, unspecified type     ICD-10-CM: F41.9 ICD-9-CM: 300.00 Insomnia, unspecified type     ICD-10-CM: G47.00 ICD-9-CM: 780.52 Vitals BP Pulse Height(growth percentile) Weight(growth percentile) BMI OB Status 180/71 85 5' 2\" (1.575 m) 173 lb (78.5 kg) 31.64 kg/m2 Hysterectomy Smoking Status Former Smoker Vitals History BMI and BSA Data Body Mass Index Body Surface Area  
 31.64 kg/m 2 1.85 m 2 Preferred Pharmacy Pharmacy Name Phone [facility] [address] [place], [address]  [address] [phone] Your Updated Medication List  
  
   
This list is accurate as of: 12/18/17 10:48 AM.  Always use your most recent med list.  
  
  
  
  
 ALPRAZolam 1 mg tablet Commonly known as:  Charlett Williams Take 1 Tab by mouth two (2) times daily as needed for Anxiety for up to 30 days. Max Daily Amount: 2 mg. Indications: anxiety ASPIRIN PO Take 81 mg by mouth daily. CARTIA  mg ER capsule Generic drug:  dilTIAZem CD Take 240 mg by mouth daily. clobetasol 0.05 % topical cream  
Commonly known as:  TEMOVATE  
  
 estradiol 10 mcg Tab vaginal tablet Commonly known as:  Lavone Vaz Insert 10 mcg into vagina two (2) days a week. fenofibrate 160 mg tablet Commonly known as:  LOFIBRA Take 1 Tab by mouth nightly. glipiZIDE SR 5 mg CR tablet Commonly known as:  GLUCOTROL XL Take 5 mg by mouth daily. ibuprofen 600 mg tablet Commonly known as:  MOTRIN  
TAKE 1 TABLET EVERY 8 HOURS WITH FOOD IF NEEDED FOR PAIN  
  
 * lamoTRIgine 100 mg tablet Commonly known as: LaMICtal  
Take 1/2 tablet in the morning and 1 tablet at night * lamoTRIgine 25 mg tablet Commonly known as: LaMICtal  
Take 1 Tab by mouth every morning. levocetirizine 5 mg tablet Commonly known as:  Kathyanne Lennox Take  by mouth.  
  
 metFORMIN  mg tablet Commonly known as:  GLUCOPHAGE XR NexIUM 20 mg capsule Generic drug:  esomeprazole Take 40 mg by mouth daily. raloxifene 60 mg tablet Commonly known as:  EVISTA Take 60 mg by mouth daily. valACYclovir 1 gram tablet Commonly known as:  VALTREX Take 1,000 mg by mouth as needed. VITAMIN D3 1,000 unit tablet Generic drug:  cholecalciferol Take 1,000 Units by mouth daily. * Notice: This list has 2 medication(s) that are the same as other medications prescribed for you. Read the directions carefully, and ask your doctor or other care provider to review them with you. Prescriptions Printed Refills ALPRAZolam (XANAX) 1 mg tablet 0 Sig: Take 1 Tab by mouth two (2) times daily as needed for Anxiety for up to 30 days. Max Daily Amount: 2 mg. Indications: anxiety Class: Print Route: Oral  
  
Follow-up Instructions Return in about 3 weeks (around 1/8/2018). Introducing [facility] [place] & HE[facility]! [facility] introduces [employer] patient portal. Now you can access parts of your medical record, email your doctor's office, and request medication refills online. 1. In your internet browser, go to [email or web address] 2. Click on the First Time User? Click Here link in the Sign In box. You will see the New Member Sign Up page. 3. Enter your [employer] Access Code exactly as it appears below. You will not need to use this code after youve completed the sign-up process. If you do not sign up before the expiration date, you must request a new code. · [employer] Access Code: [email or web address] Expires: 1/28/2018 12:40 PM 
 
4. Enter the last four digits of your Social Security Number (xxxx) and Date of Birth (mm/dd/yyyy) as indicated and click Submit. You will be taken to the next sign-up page. 5. Create a [employer] ID. This will be your [employer] login ID and cannot be changed, so think of one that is secure and easy to remember. 6. Create a [employer] password. You can change your password at any time. 7. Enter your Password Reset Question and Answer. This can be used at a later time if you forget your password. 8. Enter your e-mail address.  You will receive e-mail notification when new information is available in [employer]. 9. Click Sign Up. You can now view and download portions of your medical record. 10. Click the Download Summary menu link to download a portable copy of your medical information. If you have questions, please visit the Frequently Asked Questions section of the [employer] website. Remember, [employer] is NOT to be used for urgent needs. For medical emergencies, dial 911. Now available from your iPhone and Android! Please provide this summary of care documentation to your next provider. Your primary care clinician is listed as Andrzej Aguero. If you have any questions after today's visit, please call [phone].

## 2017-12-18 NOTE — PROGRESS NOTES
Psychiatric Progress Note    Date: 12/18/2017  Account Number:  [id number]  Name: Ruth Moss    Length of psychotherapy session: 15 minutes     Total Patient Care Time Spent: 20 minutes : (Coordinated care:  counseling time with patient, individual psychotherapy with patient; discussions with family members and chart review). SUBJECTIVE:   Ruth Moss  is a 60 y.o.  female  patient presents for a therapy/psychopharmacological management appointment. Pt reports having periods of crying every Tue/Wed/Thur, she is not sure why, denies any new stress, no triggers, also has some underlying anger. Being pessimistic about her mental illness. Taking Ativan as scheduled, states its not working, she sometimes wakes up with panic attack. Patient denies SI/HI/SIB. No evidence of AH/VH or delusions.       Appetite:no change from normal   Sleep: no change     Response to Treatment: some improvement   Side Effects: none      Supportive/Cognitive/Reality-Oriented psychotherapy provided in regards to psychosocial stressors:   pre-admission and current problems   Housing issues   Occupational issues   Academic issues   Legal issues   Medical issues   Interpersonal conflicts   Stress of hospitalization  Psychoeducation provided  Treatment plan reviewed with patient-including diagnosis and medications  Worked on issues of denial & effects of substance dependency/use      OBJECTIVE:                 Mental Status exam: WNL except for      Sensorium  oriented to time, place and person   Relations cooperative, passive, resistive     Eye Contact    fair to poor   Appearance:  age appropriate, casually dressed and obese   Motor Behavior:  within normal limits   Speech:  normal pitch and normal volume   Thought Process: goal directed   Thought Content free of delusions and free of hallucinations   Suicidal ideations none   Homicidal ideations none   Mood:  Labile/anxious    Affect:  Constricted/mood congruent    Memory recent  adequate   Memory remote:  adequate   Concentration:  adequate   Abstraction:  abstract   Insight:  Fair/good   Reliability fair/good   Judgment:  Fair/good       Allergies   Allergen Reactions    Augmentin [Amoxicillin-Pot Clavulanate] Diarrhea and Nausea and Vomiting    Darvocet A500 [Propoxyphene N-Acetaminophen] Other (comments)     Heart flutters    Doxycycline Other (comments)     Stomach pain and diarrhea. Has taken since without problems.  Gentamicin Swelling    Percocet [Oxycodone-Acetaminophen] Rash and Itching    Stelazine Swelling     Tongue swelling    Sulfa (Sulfonamide Antibiotics) Hives    Sumycin [Tetracycline] Other (comments)     Tongue tingling    Zegerid [Omeprazole-Sodium Bicarbonate] Unknown (comments)     Takes nexium without problems, but if she takes zegerid, she has nausea and sweating. Current Outpatient Prescriptions   Medication Sig Dispense Refill    ALPRAZolam (XANAX) 1 mg tablet Take 1 Tab by mouth two (2) times daily as needed for Anxiety for up to 30 days. Max Daily Amount: 2 mg. Indications: anxiety 60 Tab 0    lamoTRIgine (LAMICTAL) 25 mg tablet Take 1 Tab by mouth every morning. 30 Tab 0    levocetirizine (XYZAL) 5 mg tablet Take  by mouth.  lamoTRIgine (LAMICTAL) 100 mg tablet Take 1/2 tablet in the morning and 1 tablet at night 45 Tab 0    clobetasol (TEMOVATE) 0.05 % topical cream       ibuprofen (MOTRIN) 600 mg tablet TAKE 1 TABLET EVERY 8 HOURS WITH FOOD IF NEEDED FOR PAIN  0    glipiZIDE SR (GLUCOTROL XL) 5 mg CR tablet Take 5 mg by mouth daily.  metFORMIN ER (GLUCOPHAGE XR) 500 mg tablet       diltiazem CD (CARTIA XT) 240 mg ER capsule Take 240 mg by mouth daily.  ASPIRIN PO Take 81 mg by mouth daily.  esomeprazole (NEXIUM) 20 mg capsule Take 40 mg by mouth daily.  cholecalciferol (VITAMIN D3) 1,000 unit tablet Take 1,000 Units by mouth daily.  estradiol (VAGIFEM) 10 mcg tab vaginal tablet Insert 10 mcg into vagina two (2) days a week.  raloxifene (EVISTA) 60 mg tablet Take 60 mg by mouth daily.  fenofibrate 160 mg Tab Take 1 Tab by mouth nightly.  valacyclovir (VALTREX) 1 g tablet Take 1,000 mg by mouth as needed. Medication Changes/Adjustments:   Medications Discontinued During This Encounter   Medication Reason    ondansetron hcl (ZOFRAN) 8 mg tablet Therapy Completed    dicyclomine (BENTYL) 10 mg capsule Therapy Completed    cyclobenzaprine (FLEXERIL) 10 mg tablet Therapy Completed    LORazepam (ATIVAN) 1 mg tablet Not A Current Medication          ASSESSMENT:  Hanna Boles  is a 60 y.o.  female patient presented for her f/u appointment. Pt continues to have mood swings, intense anxiety and panic, her Lamictal dose was recently increased, will switch her Ativan to Xanax, recommended taking Xanax only as needed. Diagnoses:   Axis I:  Major Depressive disorder vs Bipolar Depression             Anxiety disorder             Insomnia   Axis II: Personality disorder NOS  Axis III: As noted above  Axis IV:Moderate   Axis V: 55-65    RECOMMENDATIONS/PLAN:   1. Medications: Medications reviewed, will d/c Ativan, start pt on Xanax 1mg BID PRB, continue rest of the meds as such. Orders Placed This Encounter    ALPRAZolam (XANAX) 1 mg tablet      2. Follow-up Disposition:  Return in about 3 weeks (around 1/8/2018).

## 2018-01-08 ENCOUNTER — OFFICE VISIT (OUTPATIENT)
Dept: BEHAVIORAL/MENTAL HEALTH CLINIC | Age: 63
End: 2018-01-08

## 2018-01-08 VITALS
HEART RATE: 81 BPM | BODY MASS INDEX: 31.1 KG/M2 | SYSTOLIC BLOOD PRESSURE: 166 MMHG | HEIGHT: 62 IN | WEIGHT: 169 LBS | DIASTOLIC BLOOD PRESSURE: 69 MMHG

## 2018-01-08 DIAGNOSIS — G47.00 INSOMNIA, UNSPECIFIED TYPE: ICD-10-CM

## 2018-01-08 DIAGNOSIS — F39 MOOD DISORDER (HCC): Primary | ICD-10-CM

## 2018-01-08 DIAGNOSIS — F41.9 ANXIETY DISORDER, UNSPECIFIED TYPE: ICD-10-CM

## 2018-01-08 RX ORDER — LAMOTRIGINE 100 MG/1
100 TABLET ORAL 2 TIMES DAILY
Qty: 60 TAB | Refills: 1 | Status: SHIPPED | OUTPATIENT
Start: 2018-01-08 | End: 2018-02-14 | Stop reason: SDUPTHER

## 2018-01-08 RX ORDER — VALSARTAN 160 MG/1
TABLET ORAL DAILY
COMMUNITY
End: 2018-06-07 | Stop reason: ALTCHOICE

## 2018-01-08 RX ORDER — ALPRAZOLAM 0.5 MG/1
0.5 TABLET ORAL
Qty: 90 TAB | Refills: 1 | Status: SHIPPED | OUTPATIENT
Start: 2018-01-08 | End: 2018-02-14 | Stop reason: DRUGHIGH

## 2018-01-08 NOTE — MR AVS SNAPSHOT
Visit Information Date & Time Provider Department Dept. Phone Encounter #  
 1/8/2018 11:30 AM Cali Staples MD [address] [facility] [phone] [id number] Follow-up Instructions Return in about 1 month (around 2/8/2018). Upcoming Health Maintenance Date Due Hepatitis C Screening 1955 DTaP/Tdap/Td series (1 - Tdap) 1/28/1975 PAP AKA CERVICAL CYTOLOGY 1/28/1975 FOBT Q 1 YEAR AGE 50-75 1/28/2004 BREAST CANCER SCRN MAMMOGRAM 7/7/2011 ZOSTER VACCINE AGE 60> 11/28/2013 Influenza Age 5 to Adult 8/1/2017 Allergies as of 1/8/2018  Review Complete On: 1/8/2018 By: Cali Staples MD  
  
 Severity Noted Reaction Type Reactions Augmentin [Amoxicillin-pot Clavulanate]  07/22/2016    Diarrhea, Nausea and Vomiting Darvocet A500 [Propoxyphene N-acetaminophen]  11/10/2010    Other (comments) Heart flutters Doxycycline  11/10/2010    Other (comments) Stomach pain and diarrhea. Has taken since without problems. Gentamicin  07/22/2016    Swelling Percocet [Oxycodone-acetaminophen]  11/10/2010    Rash, Itching Stelazine  03/10/2011    Swelling Tongue swelling Sulfa (Sulfonamide Antibiotics)  11/10/2010    Hives Sumycin [Tetracycline]  03/10/2011    Other (comments) Tongue tingling Zegerid [Omeprazole-sodium Bicarbonate]  11/10/2010    Unknown (comments) Takes nexium without problems, but if she takes zegerid, she has nausea and sweating. Current Immunizations  Never Reviewed No immunizations on file. Not reviewed this visit You Were Diagnosed With   
  
 Codes Comments [facility] ([facility]    -  Primary ICD-10-CM: F39 
ICD-9-CM: 296.90 Anxiety disorder, unspecified type     ICD-10-CM: F41.9 ICD-9-CM: 300.00 Insomnia, unspecified type     ICD-10-CM: G47.00 ICD-9-CM: 780.52 Vitals OB Status Smoking Status Hysterectomy Former Smoker Preferred Pharmacy Pharmacy Name Phone [facility] [address], [address] [place] [address] Candis Sicard [phone] Your Updated Medication List  
  
   
This list is accurate as of: 1/8/18 11:38 AM.  Always use your most recent med list.  
  
  
  
  
 ALPRAZolam 0.5 mg tablet Commonly known as:  Nicole Marking Take 1 Tab by mouth three (3) times daily as needed for Anxiety. Max Daily Amount: 1.5 mg. Indications: anxiety ASPIRIN PO Take 81 mg by mouth daily. CARTIA  mg ER capsule Generic drug:  dilTIAZem CD Take 240 mg by mouth daily. clobetasol 0.05 % topical cream  
Commonly known as:  TEMOVATE  
  
 estradiol 10 mcg Tab vaginal tablet Commonly known as:  Boni Mohs Insert 10 mcg into vagina two (2) days a week. fenofibrate 160 mg tablet Commonly known as:  LOFIBRA Take 1 Tab by mouth nightly. glipiZIDE SR 5 mg CR tablet Commonly known as:  GLUCOTROL XL Take 5 mg by mouth daily. ibuprofen 600 mg tablet Commonly known as:  MOTRIN  
TAKE 1 TABLET EVERY 8 HOURS WITH FOOD IF NEEDED FOR PAIN  
  
 * lamoTRIgine 100 mg tablet Commonly known as: LaMICtal  
TAKE ONE-HALF TABLET BY MOUTH EVERY MORNING AND TAKE ONE TABLET BY MOUTH EVERY EVENING  
  
 * lamoTRIgine 100 mg tablet Commonly known as: LaMICtal  
Take 1 Tab by mouth two (2) times a day. levocetirizine 5 mg tablet Commonly known as:  [place] Timo Take  by mouth.  
  
 metFORMIN  mg tablet Commonly known as:  GLUCOPHAGE XR NexIUM 20 mg capsule Generic drug:  esomeprazole Take 40 mg by mouth daily. raloxifene 60 mg tablet Commonly known as:  EVISTA Take 60 mg by mouth daily. valACYclovir 1 gram tablet Commonly known as:  VALTREX Take 1,000 mg by mouth as needed. VITAMIN D3 1,000 unit tablet Generic drug:  cholecalciferol Take 1,000 Units by mouth daily. * Notice:   This list has 2 medication(s) that are the same as other medications prescribed for you. Read the directions carefully, and ask your doctor or other care provider to review them with you. Prescriptions Printed Refills ALPRAZolam (XANAX) 0.5 mg tablet 1 Sig: Take 1 Tab by mouth three (3) times daily as needed for Anxiety. Max Daily Amount: 1.5 mg. Indications: anxiety Class: Print Route: Oral  
  
Prescriptions Sent to Pharmacy Refills  
 lamoTRIgine (LAMICTAL) 100 mg tablet 1 Sig: Take 1 Tab by mouth two (2) times a day. Class: Normal  
 Pharmacy: [facility] [address] , [address] [place] [address] Mauro Gaxiola  #: [phone] Route: Oral  
  
Follow-up Instructions Return in about 1 month (around 2/8/2018). Introducing [address]! Tyrese Awan introduces [employer] patient portal. Now you can access parts of your medical record, email your doctor's office, and request medication refills online. 1. In your internet browser, go to [email or web address] 2. Click on the First Time User? Click Here link in the Sign In box. You will see the New Member Sign Up page. 3. Enter your [employer] Access Code exactly as it appears below. You will not need to use this code after youve completed the sign-up process. If you do not sign up before the expiration date, you must request a new code. · [employer] Access Code: [email or web address] Expires: 1/28/2018 12:40 PM 
 
4. Enter the last four digits of your Social Security Number (xxxx) and Date of Birth (mm/dd/yyyy) as indicated and click Submit. You will be taken to the next sign-up page. 5. Create a [employer] ID. This will be your [employer] login ID and cannot be changed, so think of one that is secure and easy to remember. 6. Create a [employer] password. You can change your password at any time. 7. Enter your Password Reset Question and Answer. This can be used at a later time if you forget your password. 8. Enter your e-mail address. You will receive e-mail notification when new information is available in [address] 9. Click Sign Up. You can now view and download portions of your medical record. 10. Click the Download Summary menu link to download a portable copy of your medical information. If you have questions, please visit the Frequently Asked Questions section of the [employer] website. Remember, [employer] is NOT to be used for urgent needs. For medical emergencies, dial 911. Now available from your iPhone and Android! Please provide this summary of care documentation to your next provider. Your primary care clinician is listed as [place]appoose Liner. If you have any questions after today's visit, please call [phone].

## 2018-01-09 NOTE — PROGRESS NOTES
Psychiatric Progress Note    Date: 1/8/2018  Account Number:  [id number]  Name: Steph Bruno    Length of psychotherapy session: 15 minutes     Total Patient Care Time Spent: 20 minutes : (Coordinated care:  counseling time with patient, individual psychotherapy with patient; discussions with family members and chart review). SUBJECTIVE:   Steph Bruno  is a 60 y.o.  female  patient presents for a therapy/psychopharmacological management appointment. Pt reports doing a little better, was not as tense or depressed as before, she was animated today. She reportedly went back to Ativan and felt that Curtis Smoker was too strong for her, now she wants to go up on Ativan. Discouraged increasing the dose of Ativan and recommended going back on Xanax in a lower dose. Pt agreed. Patient denies SI/HI/SIB. No evidence of AH/VH or delusions.       Appetite:no change from normal   Sleep: no change     Response to Treatment: some improvement   Side Effects: none      Supportive/Cognitive/Reality-Oriented psychotherapy provided in regards to psychosocial stressors:   pre-admission and current problems   Housing issues   Occupational issues   Academic issues   Legal issues   Medical issues   Interpersonal conflicts   Stress of hospitalization  Psychoeducation provided  Treatment plan reviewed with patient-including diagnosis and medications  Worked on issues of denial & effects of substance dependency/use      OBJECTIVE:                 Mental Status exam: WNL except for      Sensorium  oriented to time, place and person   Relations cooperative, passive, resistive     Eye Contact    fair to poor   Appearance:  age appropriate, casually dressed and obese   Motor Behavior:  within normal limits   Speech:  normal pitch and normal volume   Thought Process: goal directed   Thought Content free of delusions and free of hallucinations   Suicidal ideations none   Homicidal ideations none   Mood:  Improved    Affect:  Constricted/animated   Memory recent  adequate   Memory remote:  adequate   Concentration:  adequate   Abstraction:  abstract   Insight:  Fair/good   Reliability fair/good   Judgment:  Fair/good       Allergies   Allergen Reactions    Augmentin [Amoxicillin-Pot Clavulanate] Diarrhea and Nausea and Vomiting    Darvocet A500 [Propoxyphene N-Acetaminophen] Other (comments)     Heart flutters    Doxycycline Other (comments)     Stomach pain and diarrhea. Has taken since without problems.  Gentamicin Swelling    Percocet [Oxycodone-Acetaminophen] Rash and Itching    Stelazine Swelling     Tongue swelling    Sulfa (Sulfonamide Antibiotics) Hives    Sumycin [Tetracycline] Other (comments)     Tongue tingling    Zegerid [Omeprazole-Sodium Bicarbonate] Unknown (comments)     Takes nexium without problems, but if she takes zegerid, she has nausea and sweating. Current Outpatient Prescriptions   Medication Sig Dispense Refill    lamoTRIgine (LAMICTAL) 100 mg tablet Take 1 Tab by mouth two (2) times a day. 60 Tab 1    ALPRAZolam (XANAX) 0.5 mg tablet Take 1 Tab by mouth three (3) times daily as needed for Anxiety. Max Daily Amount: 1.5 mg. Indications: anxiety 90 Tab 1    valsartan (DIOVAN) 160 mg tablet Take  by mouth daily.  lamoTRIgine (LAMICTAL) 100 mg tablet TAKE ONE-HALF TABLET BY MOUTH EVERY MORNING AND TAKE ONE TABLET BY MOUTH EVERY EVENING 45 Tab 0    clobetasol (TEMOVATE) 0.05 % topical cream       ibuprofen (MOTRIN) 600 mg tablet TAKE 1 TABLET EVERY 8 HOURS WITH FOOD IF NEEDED FOR PAIN  0    glipiZIDE SR (GLUCOTROL XL) 5 mg CR tablet Take 5 mg by mouth daily.  diltiazem CD (CARTIA XT) 240 mg ER capsule Take 240 mg by mouth daily.  esomeprazole (NEXIUM) 20 mg capsule Take 40 mg by mouth daily.  cholecalciferol (VITAMIN D3) 1,000 unit tablet Take 1,000 Units by mouth daily.  estradiol (VAGIFEM) 10 mcg tab vaginal tablet Insert 10 mcg into vagina two (2) days a week.       levocetirizine (XYZAL) 5 mg tablet Take  by mouth.  metFORMIN ER (GLUCOPHAGE XR) 500 mg tablet       ASPIRIN PO Take 81 mg by mouth daily.  raloxifene (EVISTA) 60 mg tablet Take 60 mg by mouth daily.  fenofibrate 160 mg Tab Take 1 Tab by mouth nightly.  valacyclovir (VALTREX) 1 g tablet Take 1,000 mg by mouth as needed. Medication Changes/Adjustments:   Medications Discontinued During This Encounter   Medication Reason    lamoTRIgine (LAMICTAL) 25 mg tablet Dose Adjustment    ALPRAZolam (XANAX) 1 mg tablet Dose Adjustment          ASSESSMENT:  Lionel Quiroz  is a 60 y.o.  female patient presented for her f/u appointment. Doing a little better with the medication change. Will continue to adjust the dose of Lamictal.       Diagnoses:   Axis I:  Major Depressive disorder vs Bipolar Depression             Anxiety disorder             Insomnia   Axis II: Personality disorder NOS  Axis III: As noted above  Axis IV:Moderate   Axis V: 60-65    RECOMMENDATIONS/PLAN:   1. Medications: Medications reviewed, will titrate up the dose of Lamictal, will go down on Xanax for better tolerance. Orders Placed This Encounter    lamoTRIgine (LAMICTAL) 100 mg tablet    ALPRAZolam (XANAX) 0.5 mg tablet    valsartan (DIOVAN) 160 mg tablet      2. Follow-up Disposition:  Return in about 1 month (around 2/8/2018).

## 2018-02-14 ENCOUNTER — OFFICE VISIT (OUTPATIENT)
Dept: BEHAVIORAL/MENTAL HEALTH CLINIC | Age: 63
End: 2018-02-14

## 2018-02-14 VITALS
DIASTOLIC BLOOD PRESSURE: 63 MMHG | BODY MASS INDEX: 31.1 KG/M2 | WEIGHT: 169 LBS | HEART RATE: 73 BPM | TEMPERATURE: 98.4 F | SYSTOLIC BLOOD PRESSURE: 130 MMHG | HEIGHT: 62 IN | RESPIRATION RATE: 14 BRPM | OXYGEN SATURATION: 97 %

## 2018-02-14 DIAGNOSIS — F41.9 ANXIETY DISORDER, UNSPECIFIED TYPE: ICD-10-CM

## 2018-02-14 DIAGNOSIS — F39 MOOD DISORDER (HCC): Primary | ICD-10-CM

## 2018-02-14 RX ORDER — LAMOTRIGINE 100 MG/1
100 TABLET ORAL 2 TIMES DAILY
Qty: 60 TAB | Refills: 0 | Status: SHIPPED | OUTPATIENT
Start: 2018-02-14 | End: 2018-03-13 | Stop reason: SDUPTHER

## 2018-02-14 RX ORDER — LAMOTRIGINE 25 MG/1
TABLET ORAL
Qty: 60 TAB | Refills: 0 | Status: SHIPPED | OUTPATIENT
Start: 2018-02-14 | End: 2018-03-13 | Stop reason: SDUPTHER

## 2018-02-14 RX ORDER — FLUTICASONE PROPIONATE 50 MCG
2 SPRAY, SUSPENSION (ML) NASAL DAILY
COMMUNITY
End: 2018-08-07 | Stop reason: ALTCHOICE

## 2018-02-14 RX ORDER — ALPRAZOLAM 0.5 MG/1
0.5 TABLET ORAL
Qty: 60 TAB | Refills: 1 | Status: SHIPPED | OUTPATIENT
Start: 2018-02-14 | End: 2018-03-13 | Stop reason: SDUPTHER

## 2018-02-14 NOTE — MR AVS SNAPSHOT
[address] [place] [address] 
[phone] Patient: Esteban Gomez MRN: [id number] GPZ:[id number]/84/2165 Visit Information Date & Time Provider Department Dept. Phone Encounter #  
 2/14/2018 10:45 AM Kamran Marcial [place] [address] [facility] [phone] [id number] Follow-up Instructions Return in about 1 month (around 3/14/2018). Upcoming Health Maintenance Date Due Hepatitis C Screening 1955 DTaP/Tdap/Td series (1 - Tdap) 1/28/1975 PAP AKA CERVICAL CYTOLOGY 1/28/1975 FOBT Q 1 YEAR AGE 50-75 1/28/2004 BREAST CANCER SCRN MAMMOGRAM 7/7/2011 ZOSTER VACCINE AGE 60> 11/28/2013 Influenza Age 5 to Adult 8/1/2017 Allergies as of 2/14/2018  Review Complete On: 2/14/2018 By: Kamran Marcial MD  
  
 Severity Noted Reaction Type Reactions Augmentin [Amoxicillin-pot Clavulanate]  07/22/2016    Diarrhea, Nausea and Vomiting Darvocet A500 [Propoxyphene N-acetaminophen]  11/10/2010    Other (comments) Heart flutters Doxycycline  11/10/2010    Other (comments) Stomach pain and diarrhea. Has taken since without problems. Gentamicin  07/22/2016    Swelling Percocet [Oxycodone-acetaminophen]  11/10/2010    Rash, Itching Stelazine  03/10/2011    Swelling Tongue swelling Sulfa (Sulfonamide Antibiotics)  11/10/2010    Hives Sumycin [Tetracycline]  03/10/2011    Other (comments) Tongue tingling Zegerid [Omeprazole-sodium Bicarbonate]  11/10/2010    Unknown (comments) Takes nexium without problems, but if she takes zegerid, she has nausea and sweating. Current Immunizations  Never Reviewed No immunizations on file. Not reviewed this visit You Were Diagnosed With   
  
 Codes Comments [facility] ([facility]    -  Primary ICD-10-CM: F39 
ICD-9-CM: 296.90 Anxiety disorder, unspecified type     ICD-10-CM: F41.9 ICD-9-CM: 300.00 Vitals BP Pulse Temp Resp Height(growth percentile) Weight(growth percentile) 130/63 (BP 1 Location: Left arm, BP Patient Position: Sitting) 73 98.4 °F (36.9 °C) (Oral) 14 5' 2\" (1.575 m) 169 lb (76.7 kg) SpO2 BMI OB Status Smoking Status 97% 30.91 kg/m2 Hysterectomy Former Smoker Vitals History BMI and BSA Data Body Mass Index Body Surface Area 30.91 kg/m 2 1.83 m 2 Preferred Pharmacy Pharmacy Name Phone [facility] [address], [address] [place] [address]  [address] [phone] Your Updated Medication List  
  
   
This list is accurate as of: 2/14/18 11:31 AM.  Always use your most recent med list.  
  
  
  
  
 ALPRAZolam 0.5 mg tablet Commonly known as:  Dayla Bake Take 1 Tab by mouth two (2) times daily as needed for Anxiety. Max Daily Amount: 1 mg. Indications: anxiety ASPIRIN PO Take 81 mg by mouth daily. CARTIA  mg ER capsule Generic drug:  dilTIAZem CD Take 240 mg by mouth daily. clobetasol 0.05 % topical cream  
Commonly known as:  TEMOVATE  
  
 estradiol 10 mcg Tab vaginal tablet Commonly known as:  [facility] Insert 10 mcg into vagina two (2) days a week. fenofibrate 160 mg tablet Commonly known as:  LOFIBRA Take 1 Tab by mouth nightly. FLONASE 50 mcg/actuation nasal spray Generic drug:  fluticasone 2 Sprays by Both Nostrils route daily. glipiZIDE SR 5 mg CR tablet Commonly known as:  GLUCOTROL XL Take 5 mg by mouth daily. ibuprofen 600 mg tablet Commonly known as:  MOTRIN  
TAKE 1 TABLET EVERY 8 HOURS WITH FOOD IF NEEDED FOR PAIN  
  
 * lamoTRIgine 100 mg tablet Commonly known as: LaMICtal  
Take 1 Tab by mouth two (2) times a day. * lamoTRIgine 25 mg tablet Commonly known as: LaMICtal  
Take 1 tablet in the morning for 15 days, then take 1 tablet twice daily  
  
 levocetirizine 5 mg tablet Commonly known as:  Lisa Mate Take  by mouth. metFORMIN  mg tablet Commonly known as:  GLUCOPHAGE XR NexIUM 20 mg capsule Generic drug:  esomeprazole Take 40 mg by mouth daily. OSELTAMIVIR PO  
75 mg.  
  
 raloxifene 60 mg tablet Commonly known as:  EVISTA Take 60 mg by mouth daily. valACYclovir 1 gram tablet Commonly known as:  VALTREX Take 1,000 mg by mouth as needed. valsartan 160 mg tablet Commonly known as:  DIOVAN Take  by mouth daily. VITAMIN D3 1,000 unit tablet Generic drug:  cholecalciferol Take 1,000 Units by mouth daily. * Notice: This list has 2 medication(s) that are the same as other medications prescribed for you. Read the directions carefully, and ask your doctor or other care provider to review them with you. Prescriptions Printed Refills ALPRAZolam (XANAX) 0.5 mg tablet 1 Sig: Take 1 Tab by mouth two (2) times daily as needed for Anxiety. Max Daily Amount: 1 mg. Indications: anxiety Class: Print Route: Oral  
  
Prescriptions Sent to Pharmacy Refills  
 lamoTRIgine (LAMICTAL) 100 mg tablet 0 Sig: Take 1 Tab by mouth two (2) times a day. Class: Normal  
 Pharmacy: [address]  [address] Ph #: [phone] Route: Oral  
 lamoTRIgine (LAMICTAL) 25 mg tablet 0 Sig: Take 1 tablet in the morning for 15 days, then take 1 tablet twice daily Class: Normal  
 Pharmacy: [address]  [address] Ph #: [phone] Follow-up Instructions Return in about 1 month (around 3/14/2018). Introducing [facility]! [facility] introduces [employer] patient portal. Now you can access parts of your medical record, email your doctor's office, and request medication refills online. 1. In your internet browser, go to [email or web address] 2. Click on the First Time User? Click Here link in the Sign In box.  You will see the New Member Sign Up page. 3. Enter your [employer] Access Code exactly as it appears below. You will not need to use this code after youve completed the sign-up process. If you do not sign up before the expiration date, you must request a new code. · [employer] Access Code: [email or web address] Expires: 5/15/2018 11:27 AM 
 
4. Enter the last four digits of your Social Security Number (xxxx) and Date of Birth (mm/dd/yyyy) as indicated and click Submit. You will be taken to the next sign-up page. 5. Create a [employer] ID. This will be your [employer] login ID and cannot be changed, so think of one that is secure and easy to remember. 6. Create a [employer] password. You can change your password at any time. 7. Enter your Password Reset Question and Answer. This can be used at a later time if you forget your password. 8. Enter your e-mail address. You will receive e-mail notification when new information is available in [address]. 9. Click Sign Up. You can now view and download portions of your medical record. 10. Click the Download Summary menu link to download a portable copy of your medical information. If you have questions, please visit the Frequently Asked Questions section of the [employer] website. Remember, [employer] is NOT to be used for urgent needs. For medical emergencies, dial 911. Now available from your iPhone and Android! Please provide this summary of care documentation to your next provider. Your primary care clinician is listed as Glenoma Liner. If you have any questions after today's visit, please call [phone].

## 2018-02-14 NOTE — PROGRESS NOTES
Charlie Henry is a 58 y.o. female    Chief Complaint   Patient presents with    Mental Health Problem     Mood Disorder     1. Have you been to the ER, urgent care clinic since your last visit? Hospitalized since your last visit? No    2. Have you seen or consulted any other health care providers outside of the [address] [place] [address] since your last visit? Include any pap smears or colon screening.  No     Visit Vitals    /63 (BP 1 Location: Left arm, BP Patient Position: Sitting)    Pulse 73    Temp 98.4 °F (36.9 °C) (Oral)    Resp 14    Ht 5' 2\" (1.575 m)    Wt 76.7 kg (169 lb)    SpO2 97%    BMI 30.91 kg/m2

## 2018-02-15 NOTE — PROGRESS NOTES
Psychiatric Progress Note    Date: 2/14/2018  Account Number:  [id number]  Name: Susan Del Rio    Length of psychotherapy session: 15 minutes     Total Patient Care Time Spent: 20 minutes : (Coordinated care:  counseling time with patient, individual psychotherapy with patient; discussions with family members and chart review). SUBJECTIVE:   Susan Del Rio  is a 58 y.o.  female  patient presents for a therapy/psychopharmacological management appointment. Pt reports doing better, states she still cries but not as much, mood is improved. Feels that the medicine is helping her. Is agreeable to continue to titrate up the medicine. Patient denies SI/HI/SIB. No evidence of AH/VH or delusions.       Appetite:no change from normal   Sleep: no change     Response to Treatment: some improvement   Side Effects: none      Supportive/Cognitive/Reality-Oriented psychotherapy provided in regards to psychosocial stressors:   pre-admission and current problems   Housing issues   Occupational issues   Academic issues   Legal issues   Medical issues   Interpersonal conflicts   Stress of hospitalization  Psychoeducation provided  Treatment plan reviewed with patient-including diagnosis and medications  Worked on issues of denial & effects of substance dependency/use      OBJECTIVE:                 Mental Status exam: WNL except for      Sensorium  oriented to time, place and person   Relations cooperative, passive, resistive     Eye Contact    fair to poor   Appearance:  age appropriate, appropriately dressed and groomed, overweight   Motor Behavior:  within normal limits   Speech:  normal pitch and normal volume   Thought Process: goal directed   Thought Content free of delusions and free of hallucinations   Suicidal ideations none   Homicidal ideations none   Mood:  Improved    Affect:  Less constricted    Memory recent  adequate   Memory remote:  adequate   Concentration:  adequate   Abstraction:  abstract   Insight:  Fair/good Reliability fair/good   Judgment:  Fair/good       Allergies   Allergen Reactions    Augmentin [Amoxicillin-Pot Clavulanate] Diarrhea and Nausea and Vomiting    Darvocet A500 [Propoxyphene N-Acetaminophen] Other (comments)     Heart flutters    Doxycycline Other (comments)     Stomach pain and diarrhea. Has taken since without problems.  Gentamicin Swelling    Percocet [Oxycodone-Acetaminophen] Rash and Itching    Stelazine Swelling     Tongue swelling    Sulfa (Sulfonamide Antibiotics) Hives    Sumycin [Tetracycline] Other (comments)     Tongue tingling    Zegerid [Omeprazole-Sodium Bicarbonate] Unknown (comments)     Takes nexium without problems, but if she takes zegerid, she has nausea and sweating. Current Outpatient Prescriptions   Medication Sig Dispense Refill    fluticasone (FLONASE) 50 mcg/actuation nasal spray 2 Sprays by Both Nostrils route daily.  lamoTRIgine (LAMICTAL) 100 mg tablet Take 1 Tab by mouth two (2) times a day. 60 Tab 0    ALPRAZolam (XANAX) 0.5 mg tablet Take 1 Tab by mouth two (2) times daily as needed for Anxiety. Max Daily Amount: 1 mg. Indications: anxiety 60 Tab 1    lamoTRIgine (LAMICTAL) 25 mg tablet Take 1 tablet in the morning for 15 days, then take 1 tablet twice daily 60 Tab 0    valsartan (DIOVAN) 160 mg tablet Take  by mouth daily.  levocetirizine (XYZAL) 5 mg tablet Take  by mouth.  ibuprofen (MOTRIN) 600 mg tablet TAKE 1 TABLET EVERY 8 HOURS WITH FOOD IF NEEDED FOR PAIN  0    glipiZIDE SR (GLUCOTROL XL) 5 mg CR tablet Take 5 mg by mouth daily.  metFORMIN ER (GLUCOPHAGE XR) 500 mg tablet       diltiazem CD (CARTIA XT) 240 mg ER capsule Take 240 mg by mouth daily.  ASPIRIN PO Take 81 mg by mouth daily.  esomeprazole (NEXIUM) 20 mg capsule Take 40 mg by mouth daily.  cholecalciferol (VITAMIN D3) 1,000 unit tablet Take 1,000 Units by mouth daily.       estradiol (VAGIFEM) 10 mcg tab vaginal tablet Insert 10 mcg into vagina two (2) days a week.  raloxifene (EVISTA) 60 mg tablet Take 60 mg by mouth daily.  fenofibrate 160 mg Tab Take 1 Tab by mouth nightly.  valacyclovir (VALTREX) 1 g tablet Take 1,000 mg by mouth as needed.  OSELTAMIVIR PHOSPHATE (OSELTAMIVIR PO) 75 mg.  clobetasol (TEMOVATE) 0.05 % topical cream           Medication Changes/Adjustments:   Medications Discontinued During This Encounter   Medication Reason    lamoTRIgine (LAMICTAL) 100 mg tablet Not A Current Medication    ALPRAZolam (XANAX) 0.5 mg tablet Dose Adjustment    lamoTRIgine (LAMICTAL) 100 mg tablet Reorder          ASSESSMENT:  Elona Litten  is a 58 y.o.  female patient presented for her f/u appointment. Responding well to the medicine. Will continue to adjust the dose of Lamictal.       Diagnoses:   Mood disorder vs Bipolar Depression  Anxiety disorder  Insomnia       RECOMMENDATIONS/PLAN:   1. Medications: Medications reviewed, will titrate up the dose of Lamictal, continue rest of the meds as such. Orders Placed This Encounter    OSELTAMIVIR PHOSPHATE (OSELTAMIVIR PO)    fluticasone (FLONASE) 50 mcg/actuation nasal spray    lamoTRIgine (LAMICTAL) 100 mg tablet    ALPRAZolam (XANAX) 0.5 mg tablet    lamoTRIgine (LAMICTAL) 25 mg tablet      2. Follow-up Disposition:  Return in about 1 month (around 3/14/2018).

## 2018-03-13 ENCOUNTER — OFFICE VISIT (OUTPATIENT)
Dept: BEHAVIORAL/MENTAL HEALTH CLINIC | Age: 63
End: 2018-03-13

## 2018-03-13 VITALS
DIASTOLIC BLOOD PRESSURE: 70 MMHG | HEART RATE: 77 BPM | WEIGHT: 169.6 LBS | OXYGEN SATURATION: 98 % | TEMPERATURE: 98.3 F | HEIGHT: 62 IN | SYSTOLIC BLOOD PRESSURE: 141 MMHG | BODY MASS INDEX: 31.21 KG/M2

## 2018-03-13 DIAGNOSIS — F39 MOOD DISORDER (HCC): Primary | ICD-10-CM

## 2018-03-13 DIAGNOSIS — F41.9 ANXIETY DISORDER, UNSPECIFIED TYPE: ICD-10-CM

## 2018-03-13 RX ORDER — PREDNISONE 10 MG/1
TABLET ORAL
COMMUNITY
Start: 2018-02-02 | End: 2018-03-13 | Stop reason: ALTCHOICE

## 2018-03-13 RX ORDER — BLOOD SUGAR DIAGNOSTIC
STRIP MISCELLANEOUS
COMMUNITY
Start: 2018-02-16

## 2018-03-13 RX ORDER — ALPRAZOLAM 1 MG/1
TABLET ORAL
COMMUNITY
Start: 2017-12-18 | End: 2018-03-13

## 2018-03-13 RX ORDER — LAMOTRIGINE 25 MG/1
25 TABLET ORAL 2 TIMES DAILY
Qty: 60 TAB | Refills: 3 | Status: SHIPPED | OUTPATIENT
Start: 2018-03-13 | End: 2018-07-16 | Stop reason: SDUPTHER

## 2018-03-13 RX ORDER — ALPRAZOLAM 0.5 MG/1
0.5 TABLET ORAL
Qty: 60 TAB | Refills: 3 | Status: SHIPPED | OUTPATIENT
Start: 2018-03-13 | End: 2018-04-12

## 2018-03-13 RX ORDER — LAMOTRIGINE 100 MG/1
100 TABLET ORAL 2 TIMES DAILY
Qty: 60 TAB | Refills: 3 | Status: SHIPPED | OUTPATIENT
Start: 2018-03-13 | End: 2018-07-17 | Stop reason: SDUPTHER

## 2018-03-13 RX ORDER — TIZANIDINE 4 MG/1
TABLET ORAL
Refills: 0 | COMMUNITY
Start: 2018-02-01 | End: 2018-03-13 | Stop reason: ALTCHOICE

## 2018-03-13 NOTE — PROGRESS NOTES
Hilario Fish is a 58 y.o. female  No chief complaint on file. 1. Have you been to the ER, urgent care clinic since your last visit? Hospitalized since your last visit? No    2. Have you seen or consulted any other health care providers outside of the [address] [place] [address] since your last visit? Include any pap smears or colon screening.  Yes PCP  Visit Vitals    Wt 76.9 kg (169 lb 9.6 oz)    BMI 31.02 kg/m2

## 2018-03-13 NOTE — MR AVS SNAPSHOT
[address] 
[phone] Patient: Airam Malhotra MRN: [id number]:[id number] Visit Information Date & Time Provider Department Dept. Phone Encounter #  
 3/13/2018  2:00 PM Allyssa Snell MD [phone] [id number] Upcoming Health Maintenance Date Due Hepatitis C Screening 1955 DTaP/Tdap/Td series (1 - Tdap) 1/28/1975 PAP AKA CERVICAL CYTOLOGY 1/28/1975 FOBT Q 1 YEAR AGE 50-75 1/28/2004 BREAST CANCER SCRN MAMMOGRAM 7/7/2011 ZOSTER VACCINE AGE 60> 11/28/2013 Influenza Age 5 to Adult 8/1/2017 Allergies as of 3/13/2018  Review Complete On: 3/13/2018 By: Allyssa Snell MD  
  
 Severity Noted Reaction Type Reactions Augmentin [Amoxicillin-pot Clavulanate]  07/22/2016    Diarrhea, Nausea and Vomiting Darvocet A500 [Propoxyphene N-acetaminophen]  11/10/2010    Other (comments) Heart flutters Doxycycline  11/10/2010    Other (comments) Stomach pain and diarrhea. Has taken since without problems. Gentamicin  07/22/2016    Swelling Percocet [Oxycodone-acetaminophen]  11/10/2010    Rash, Itching Stelazine  03/10/2011    Swelling Tongue swelling Sulfa (Sulfonamide Antibiotics)  11/10/2010    Hives Sumycin [Tetracycline]  03/10/2011    Other (comments) Tongue tingling Zegerid [Omeprazole-sodium Bicarbonate]  11/10/2010    Unknown (comments) Takes nexium without problems, but if she takes zegerid, she has nausea and sweating. Current Immunizations  Never Reviewed No immunizations on file. Not reviewed this visit You Were Diagnosed With   
  
 Codes Comments Mood disorder ([facility] [address]    -  Primary ICD-10-CM: F39 
ICD-9-CM: 296.90 Anxiety disorder, unspecified type     ICD-10-CM: F41.9 ICD-9-CM: 300.00 Vitals BP Pulse Temp Height(growth percentile) Weight(growth percentile) SpO2 141/70 (BP 1 Location: Left arm, BP Patient Position: Sitting) 77 98.3 °F (36.8 °C) (Oral) 5' 2\" (1.575 m) 169 lb 9.6 oz (76.9 kg) 98% BMI OB Status Smoking Status 31.02 kg/m2 Hysterectomy Former Smoker Vitals History BMI and BSA Data Body Mass Index Body Surface Area 31.02 kg/m 2 1.83 m 2 Preferred Pharmacy Pharmacy Name Phone [address] [place], [address] [facility] Abram Madsen [phone] Your Updated Medication List  
  
   
This list is accurate as of 3/13/18  2:54 PM.  Always use your most recent med list.  
  
  
  
  
 ALPRAZolam 0.5 mg tablet Commonly known as:  Ciara Grandchild Take 1 Tab by mouth two (2) times daily as needed for Anxiety for up to 30 days. Max Daily Amount: 1 mg. Indications: anxiety ASPIRIN PO Take 81 mg by mouth daily. CARTIA  mg ER capsule Generic drug:  dilTIAZem CD Take 240 mg by mouth daily. clobetasol 0.05 % topical cream  
Commonly known as:  TEMOVATE  
  
 estradiol 10 mcg Tab vaginal tablet Commonly known as:  Williams Sheller Insert 10 mcg into vagina two (2) days a week. fenofibrate 160 mg tablet Commonly known as:  LOFIBRA Take 1 Tab by mouth nightly. FLONASE 50 mcg/actuation nasal spray Generic drug:  fluticasone 2 Sprays by Both Nostrils route daily. glipiZIDE SR 5 mg CR tablet Commonly known as:  GLUCOTROL XL Take 5 mg by mouth daily. ibuprofen 600 mg tablet Commonly known as:  MOTRIN  
TAKE 1 TABLET EVERY 8 HOURS WITH FOOD IF NEEDED FOR PAIN  
  
 * lamoTRIgine 25 mg tablet Commonly known as: LaMICtal  
Take 1 Tab by mouth two (2) times a day. * lamoTRIgine 100 mg tablet Commonly known as: LaMICtal  
Take 1 Tab by mouth two (2) times a day. levocetirizine 5 mg tablet Commonly known as:  Jaycee Jono Take  by mouth.  
  
 metFORMIN  mg tablet Commonly known as:  GLUCOPHAGE XR NexIUM 20 mg capsule Generic drug:  esomeprazole Take 40 mg by mouth daily. ONETOUCH ULTRA TEST strip Generic drug:  glucose blood VI test strips OSELTAMIVIR PO  
75 mg.  
  
 raloxifene 60 mg tablet Commonly known as:  EVISTA Take 60 mg by mouth daily. valACYclovir 1 gram tablet Commonly known as:  VALTREX Take 1,000 mg by mouth as needed. valsartan 160 mg tablet Commonly known as:  DIOVAN Take  by mouth daily. VITAMIN D3 1,000 unit tablet Generic drug:  cholecalciferol Take 1,000 Units by mouth daily. * Notice: This list has 2 medication(s) that are the same as other medications prescribed for you. Read the directions carefully, and ask your doctor or other care provider to review them with you. Prescriptions Printed Refills ALPRAZolam (XANAX) 0.5 mg tablet 3 Sig: Take 1 Tab by mouth two (2) times daily as needed for Anxiety for up to 30 days. Max Daily Amount: 1 mg. Indications: anxiety Class: Print Route: Oral  
  
Prescriptions Sent to Pharmacy Refills  
 lamoTRIgine (LAMICTAL) 25 mg tablet 3 Sig: Take 1 Tab by mouth two (2) times a day. Class: Normal  
 Pharmacy: [address] Ph #: [phone] Route: Oral  
 lamoTRIgine (LAMICTAL) 100 mg tablet 3 Sig: Take 1 Tab by mouth two (2) times a day. Class: Normal  
 Pharmacy: [address] Ph #: [phone] Route: Oral  
  
Introducing [facility]! [facility] introduces [employer] patient portal. Now you can access parts of your medical record, email your doctor's office, and request medication refills online. 1. In your internet browser, go to [email or web address] 2. Click on the First Time User? Click Here link in the Sign In box. You will see the New Member Sign Up page. 3. Enter your Crunch[employer] Access Code exactly as it appears below. You will not need to use this code after youve completed the sign-up process. If you do not sign up before the expiration date, you must request a new code. · [employer] Access Code: [email or web address] Expires: 5/15/2018 12:27 PM 
 
4. Enter the last four digits of your Social Security Number (xxxx) and Date of Birth (mm/dd/yyyy) as indicated and click Submit. You will be taken to the next sign-up page. 5. Create a Crunchyroll ID. This will be your [employer]unch[employer] login ID and cannot be changed, so think of one that is secure and easy to remember. 6. Create a [employer] password. You can change your password at any time. 7. Enter your Password Reset Question and Answer. This can be used at a later time if you forget your password. 8. Enter your e-mail address. You will receive e-mail notification when new information is available in [address] 9. Click Sign Up. You can now view and download portions of your medical record. 10. Click the Download Summary menu link to download a portable copy of your medical information. If you have questions, please visit the Frequently Asked Questions section of the [employer] website. Remember, [employer] is NOT to be used for urgent needs. For medical emergencies, dial 911. Now available from your iPhone and Android! Please provide this summary of care documentation to your next provider. Your primary care clinician is listed as Ivanna Tan. If you have any questions after today's visit, please call [phone].

## 2018-03-16 NOTE — PROGRESS NOTES
Psychiatric Progress Note    Date: 3/13/2018  Account Number:  [id number]  Name: Tish Dale    Length of psychotherapy session: 15 minutes     Total Patient Care Time Spent: 20 minutes : (Coordinated care:  counseling time with patient, individual psychotherapy with patient; discussions with family members and chart review). SUBJECTIVE:   Tish Dale  is a 58 y.o.  female  patient presents for a therapy/psychopharmacological management appointment. Pt reports doing okay, though lately reported feeling a little depressed. Does feel that Lamictal is helpful but does not like the fact that she would be labeled as Bipolar, currently provider gave her the diagnosis of mood disorder which brought some relief to pt. Pt's anxiety is also improved. Her overall demeanor has improved. Patient denies SI/HI/SIB. No evidence of AH/VH or delusions.       Appetite:no change from normal   Sleep: no change     Response to Treatment: some improvement   Side Effects: none      Supportive/Cognitive/Reality-Oriented psychotherapy provided in regards to psychosocial stressors:   pre-admission and current problems   Housing issues   Occupational issues   Academic issues   Legal issues   Medical issues   Interpersonal conflicts   Stress of hospitalization  Psychoeducation provided  Treatment plan reviewed with patient-including diagnosis and medications  Worked on issues of denial & effects of substance dependency/use      OBJECTIVE:                 Mental Status exam: WNL except for      Sensorium  oriented to time, place and person   Relations cooperative, passive, resistive     Eye Contact    fair to poor   Appearance:  age appropriate, appropriately dressed and groomed, overweight   Motor Behavior:  within normal limits   Speech:  normal pitch and normal volume   Thought Process: goal directed   Thought Content free of delusions and free of hallucinations   Suicidal ideations none   Homicidal ideations none   Mood:  Improved    Affect: Less constricted    Memory recent  adequate   Memory remote:  adequate   Concentration:  adequate   Abstraction:  abstract   Insight:  Fair/good   Reliability fair/good   Judgment:  Fair/good       Allergies   Allergen Reactions    Augmentin [Amoxicillin-Pot Clavulanate] Diarrhea and Nausea and Vomiting    Darvocet A500 [Propoxyphene N-Acetaminophen] Other (comments)     Heart flutters    Doxycycline Other (comments)     Stomach pain and diarrhea. Has taken since without problems.  Gentamicin Swelling    Percocet [Oxycodone-Acetaminophen] Rash and Itching    Stelazine Swelling     Tongue swelling    Sulfa (Sulfonamide Antibiotics) Hives    Sumycin [Tetracycline] Other (comments)     Tongue tingling    Zegerid [Omeprazole-Sodium Bicarbonate] Unknown (comments)     Takes nexium without problems, but if she takes zegerid, she has nausea and sweating. Current Outpatient Prescriptions   Medication Sig Dispense Refill    ONETOUCH ULTRA TEST strip       ALPRAZolam (XANAX) 0.5 mg tablet Take 1 Tab by mouth two (2) times daily as needed for Anxiety for up to 30 days. Max Daily Amount: 1 mg. Indications: anxiety 60 Tab 3    lamoTRIgine (LAMICTAL) 25 mg tablet Take 1 Tab by mouth two (2) times a day. 60 Tab 3    lamoTRIgine (LAMICTAL) 100 mg tablet Take 1 Tab by mouth two (2) times a day. 60 Tab 3    fluticasone (FLONASE) 50 mcg/actuation nasal spray 2 Sprays by Both Nostrils route daily.  valsartan (DIOVAN) 160 mg tablet Take  by mouth daily.  glipiZIDE SR (GLUCOTROL XL) 5 mg CR tablet Take 5 mg by mouth daily.  metFORMIN ER (GLUCOPHAGE XR) 500 mg tablet       diltiazem CD (CARTIA XT) 240 mg ER capsule Take 240 mg by mouth daily.  ASPIRIN PO Take 81 mg by mouth daily.  esomeprazole (NEXIUM) 20 mg capsule Take 40 mg by mouth daily.  cholecalciferol (VITAMIN D3) 1,000 unit tablet Take 1,000 Units by mouth daily.       estradiol (VAGIFEM) 10 mcg tab vaginal tablet Insert 10 mcg into vagina two (2) days a week.  raloxifene (EVISTA) 60 mg tablet Take 60 mg by mouth daily.  fenofibrate 160 mg Tab Take 1 Tab by mouth nightly.  valacyclovir (VALTREX) 1 g tablet Take 1,000 mg by mouth as needed.  OSELTAMIVIR PHOSPHATE (OSELTAMIVIR PO) 75 mg.  levocetirizine (XYZAL) 5 mg tablet Take  by mouth.  clobetasol (TEMOVATE) 0.05 % topical cream       ibuprofen (MOTRIN) 600 mg tablet TAKE 1 TABLET EVERY 8 HOURS WITH FOOD IF NEEDED FOR PAIN  0        Medication Changes/Adjustments:   Medications Discontinued During This Encounter   Medication Reason    OSELTAMIVIR PHOSPHATE (OSELTAMIVIR PO) Duplicate Order    predniSONE (DELTASONE) 10 mg tablet Therapy Completed    VIRTUSSIN AC  mg/5 mL solution Therapy Completed    ALPRAZolam (XANAX) 1 mg tablet Not A Current Medication    ALPRAZolam (XANAX) 0.5 mg tablet Reorder    lamoTRIgine (LAMICTAL) 25 mg tablet Reorder    lamoTRIgine (LAMICTAL) 100 mg tablet Reorder          ASSESSMENT:  Noble Head  is a 58 y.o.  female patient presented for her f/u appointment. Responding well to the medicine. Will continue with the current meds. Diagnoses:   Mood disorder vs Bipolar Depression  Anxiety disorder  Insomnia       RECOMMENDATIONS/PLAN:   1. Medications: Medications reviewed, continue with the current meds. Orders Placed This Encounter    DISCONTD: ALPRAZolam (XANAX) 1 mg tablet    DISCONTD: OSELTAMIVIR PHOSPHATE (OSELTAMIVIR PO)    ONETOUCH ULTRA TEST strip    DISCONTD: VIRTUSSIN AC  mg/5 mL solution    DISCONTD: predniSONE (DELTASONE) 10 mg tablet    ALPRAZolam (XANAX) 0.5 mg tablet    lamoTRIgine (LAMICTAL) 25 mg tablet    lamoTRIgine (LAMICTAL) 100 mg tablet      2. Follow-up Disposition: Not on File Pt plans to f/u with either [facility] or [facility] as provider is leaving the practice.

## 2018-04-16 ENCOUNTER — HOSPITAL ENCOUNTER (OUTPATIENT)
Dept: NUCLEAR MEDICINE | Age: 63
Discharge: HOME | End: 2018-04-16
Attending: INTERNAL MEDICINE
Payer: COMMERCIAL

## 2018-04-16 DIAGNOSIS — R19.7 DIARRHEA, UNSPECIFIED: ICD-10-CM

## 2018-04-16 DIAGNOSIS — R10.13 EPIGASTRIC PAIN: ICD-10-CM

## 2018-04-16 DIAGNOSIS — E11.9 TYPE 2 DIABETES MELLITUS WITHOUT COMPLICATION (HCC): ICD-10-CM

## 2018-04-16 DIAGNOSIS — K31.7 GASTRIC POLYP: ICD-10-CM

## 2018-04-16 PROCEDURE — 78264 GASTRIC EMPTYING IMG STUDY: CPT

## 2018-04-19 ENCOUNTER — APPOINTMENT (OUTPATIENT)
Dept: GENERAL RADIOLOGY | Age: 63
End: 2018-04-19
Attending: PHYSICIAN ASSISTANT
Payer: COMMERCIAL

## 2018-04-19 ENCOUNTER — HOSPITAL ENCOUNTER (EMERGENCY)
Age: 63
Discharge: HOME | End: 2018-04-20
Attending: EMERGENCY MEDICINE
Payer: COMMERCIAL

## 2018-04-19 VITALS
SYSTOLIC BLOOD PRESSURE: 152 MMHG | HEART RATE: 78 BPM | WEIGHT: 166 LBS | OXYGEN SATURATION: 97 % | DIASTOLIC BLOOD PRESSURE: 82 MMHG | RESPIRATION RATE: 18 BRPM | BODY MASS INDEX: 30.55 KG/M2 | HEIGHT: 62 IN

## 2018-04-19 DIAGNOSIS — S80.11XA CONTUSION OF RIGHT LOWER LEG, INITIAL ENCOUNTER: ICD-10-CM

## 2018-04-19 DIAGNOSIS — S22.31XA CLOSED FRACTURE OF ONE RIB OF RIGHT SIDE, INITIAL ENCOUNTER: ICD-10-CM

## 2018-04-19 DIAGNOSIS — S92.145A CLOSED NONDISPLACED FRACTURE OF DOME OF LEFT TALUS, INITIAL ENCOUNTER: Primary | ICD-10-CM

## 2018-04-19 DIAGNOSIS — T14.8XXA ABRASION: ICD-10-CM

## 2018-04-19 PROCEDURE — 73610 X-RAY EXAM OF ANKLE: CPT

## 2018-04-19 PROCEDURE — 71111 X-RAY EXAM RIBS/CHEST4/> VWS: CPT

## 2018-04-19 PROCEDURE — 99283 EMERGENCY DEPT VISIT LOW MDM: CPT

## 2018-04-19 PROCEDURE — 74011250637 HC RX REV CODE- 250/637: Performed by: PHYSICIAN ASSISTANT

## 2018-04-19 PROCEDURE — 73630 X-RAY EXAM OF FOOT: CPT

## 2018-04-19 RX ORDER — IBUPROFEN 600 MG/1
600 TABLET ORAL
Status: COMPLETED | OUTPATIENT
Start: 2018-04-19 | End: 2018-04-19

## 2018-04-19 RX ADMIN — IBUPROFEN 600 MG: 600 TABLET, FILM COATED ORAL at 21:25

## 2018-04-20 PROCEDURE — 74011250637 HC RX REV CODE- 250/637: Performed by: PHYSICIAN ASSISTANT

## 2018-04-20 PROCEDURE — 75810000053 HC SPLINT APPLICATION

## 2018-04-20 RX ORDER — HYDROCODONE BITARTRATE AND ACETAMINOPHEN 5; 325 MG/1; MG/1
1 TABLET ORAL
Qty: 20 TAB | Refills: 0 | Status: SHIPPED | OUTPATIENT
Start: 2018-04-20 | End: 2018-08-07 | Stop reason: ALTCHOICE

## 2018-04-20 RX ORDER — NALOXONE HYDROCHLORIDE 4 MG/.1ML
SPRAY NASAL
Qty: 2 EACH | Refills: 0 | Status: SHIPPED | OUTPATIENT
Start: 2018-04-20 | End: 2018-12-06

## 2018-04-20 RX ORDER — HYDROCODONE BITARTRATE AND ACETAMINOPHEN 7.5; 325 MG/1; MG/1
1 TABLET ORAL
Status: COMPLETED | OUTPATIENT
Start: 2018-04-20 | End: 2018-04-20

## 2018-04-20 RX ORDER — ALPRAZOLAM 0.5 MG/1
0.5 TABLET ORAL 2 TIMES DAILY
COMMUNITY
End: 2018-06-07 | Stop reason: SDUPTHER

## 2018-04-20 RX ORDER — VALSARTAN 320 MG/1
TABLET ORAL DAILY
COMMUNITY
End: 2018-10-04 | Stop reason: ALTCHOICE

## 2018-04-20 RX ORDER — IBUPROFEN 600 MG/1
600 TABLET ORAL
Qty: 20 TAB | Refills: 0 | Status: SHIPPED | OUTPATIENT
Start: 2018-04-20 | End: 2018-12-06

## 2018-04-20 RX ADMIN — BACITRACIN ZINC, NEOMYCIN SULFATE, POLYMYXIN B SULFATE 1 PACKET: 3.5; 5000; 4 OINTMENT TOPICAL at 00:16

## 2018-04-20 RX ADMIN — HYDROCODONE BITARTRATE AND ACETAMINOPHEN 1 TABLET: 7.5; 325 TABLET ORAL at 00:16

## 2018-04-20 NOTE — ED NOTES
Discharge instructions given to patient by DALTON Palma. Patient verbalized understanding of discharge instructions. Pt discharged without difficulty. Pt discharged in stable condition via wheelchair, accompanied by .

## 2018-04-20 NOTE — ED NOTES
Pt arrived via wheelchair to room #04 from triage. Pt with c/o of fall leading to left ankle pain and swelling along with bilateral rib pain. Patient stated that she fell down a flight of stairs landing on a concrete floor. Patient denies LOC but did hit head on floor. Pt resting in position of comfort. Call bell within reach.  at bedside.

## 2018-04-20 NOTE — DISCHARGE INSTRUCTIONS
Bruises: Care Instructions  Your Care Instructions    Bruises occur when small blood vessels under the skin tear or rupture, most often from a twist, bump, or fall. Blood leaks into tissues under the skin and causes a black-and-blue spot that often turns colors, including purplish black, reddish blue, or yellowish green, as the bruise heals. Bruises hurt, but most are not serious and will go away on their own within 2 to 4 weeks. Sometimes, gravity causes them to spread down the body. A leg bruise usually will take longer to heal than a bruise on the face or arms. Follow-up care is a key part of your treatment and safety. Be sure to make and go to all appointments, and call your doctor if you are having problems. It's also a good idea to know your test results and keep a list of the medicines you take. How can you care for yourself at home? · Take pain medicines exactly as directed. ¨ If the doctor gave you a prescription medicine for pain, take it as prescribed. ¨ If you are not taking a prescription pain medicine, ask your doctor if you can take an over-the-counter medicine. · Put ice or a cold pack on the area for 10 to 20 minutes at a time. Put a thin cloth between the ice and your skin. · If you can, prop up the bruised area on pillows as much as possible for the next few days. Try to keep the bruise above the level of your heart. When should you call for help? Call your doctor now or seek immediate medical care if:  ? · You have signs of infection, such as:  ¨ Increased pain, swelling, warmth, or redness. ¨ Red streaks leading from the bruise. ¨ Pus draining from the bruise. ¨ A fever. ? · You have a bruise on your leg and signs of a blood clot, such as:  ¨ Increasing redness and swelling along with warmth, tenderness, and pain in the bruised area. ¨ Pain in your calf, back of the knee, thigh, or groin. ¨ Redness and swelling in your leg or groin. ? · Your pain gets worse. ? Watch closely for changes in your health, and be sure to contact your doctor if:  ? · You do not get better as expected. Where can you learn more? Go to http://yovany-collins.info/. Enter [phone] in the search box to learn more about \"Bruises: Care Instructions. \"  Current as of: March 20, 2017  Content Version: 11.4  © 2006-2017 [employer]. Care instructions adapted under license by [employer] (which disclaims liability or warranty for this information). If you have questions about a medical condition or this instruction, always ask your healthcare professional. [place] [address] any warranty or liability for your use of this information. Broken Foot: Care Instructions  Your Care Instructions    A broken foot, or foot fracture, is a break in one or more of the bones in your foot. It may happen because of a sports injury, a fall, or other accident. A compound, or open, fracture occurs when a bone breaks through the skin. A break that does not poke through the skin is a closed fracture. Your treatment depends on the location and type of break in your foot. You may need a splint, a cast, or an orthopedic shoe. Certain kinds of injuries may need surgery at some time. Whatever your treatment, you can ease symptoms and help your foot heal with care at home. You may need 6 to 8 weeks or more to fully heal.  You heal best when you take good care of yourself. Eat a variety of healthy foods, and don't smoke. Follow-up care is a key part of your treatment and safety. Be sure to make and go to all appointments, and call your doctor if you are having problems. It's also a good idea to know your test results and keep a list of the medicines you take. How can you care for yourself at home? · Be safe with medicines. Take pain medicines exactly as directed. ¨ If the doctor gave you a prescription medicine for pain, take it as prescribed.   ¨ If you are not taking a prescription pain medicine, ask your doctor if you can take an over-the-counter medicine. · Leave the splint on until your follow-up appointment. Do not put any weight on the injured foot. If you were given crutches, use them as directed. · Put ice or a cold pack on your foot for 10 to 20 minutes at a time. Try to do this every 1 to 2 hours for the next 3 days (when you are awake) or until the swelling goes down. Put a thin cloth between the ice and your skin. · Prop up the sore foot on a pillow anytime you sit or lie down during the next 3 days. Try to keep it above the level of your heart. This will help reduce swelling. · Follow the cast care instructions your doctor gives you. If you have a splint, do not take it off unless your doctor tells you to. Cast and splint care  · If you have a removable splint, ask your doctor if it is okay to remove it to bathe. Your doctor may want you to keep it on as much as possible. · Keep your plaster splint covered by taping a sheet of plastic around it when you bathe. Water under the plaster can cause your skin to itch and hurt. · Never cut your splint. When should you call for help? Call 911 anytime you think you may need emergency care. For example, call if:  ? · You have chest pain, are short of breath, or you cough up blood. ?Call your doctor now or seek immediate medical care if:  ? · You have new or worse pain. ? · Your foot is cool or pale or changes color. ? · You have tingling, weakness, or numbness in your toes. ? · Your cast or splint feels too tight. ? · You have signs of a blood clot in your leg (called a deep vein thrombosis), such as:  ¨ Pain in your calf, back of the knee, thigh, or groin. ¨ Redness or swelling in your leg. ? Watch closely for changes in your health, and be sure to contact your doctor if:  ? · You have a problem with your splint or cast.   ? · You do not get better as expected. Where can you learn more?   Go to http://yovany-collins.info/. Enter G918 in the search box to learn more about \"Broken Foot: Care Instructions. \"  Current as of: March 21, 2017  Content Version: 11.4  © 6358-5056 [employer]. Care instructions adapted under license by [employer] (which disclaims liability or warranty for this information). If you have questions about a medical condition or this instruction, always ask your healthcare professional. [place] [address] any warranty or liability for your use of this information.

## 2018-04-20 NOTE — ED PROVIDER NOTES
EMERGENCY DEPARTMENT HISTORY AND PHYSICAL EXAM      Date: 4/19/2018  Patient Name: Madhavi Abreu    History of Presenting Illness     Chief Complaint   Patient presents with   Myra aCsarez Fall     The patient is wheeled into triage. The patinet reports \"fell down a flight of steps a couple hours ago, slipped and fell\". Reports rib pain on the left side, hit her head, no LOC or headache blurry vision, and states left ankle pain and pain in the foot. Reports pain to the lateral and medial side of the foot and ankle. Code FX called at this time. Denies taking anythign for pain.  Ankle Pain    Foot Pain    Rib Pain       History Provided By: Patient    HPI: Madhavi Abreu, 58 y.o. female with PMHx significant for hyperlipidemia, HTN, fibromyalgia, arthritis, osteopenia, and GERD, presents in a wheelchair to the ED with cc of constant left ankle and bilateral rib pain which started this evening after falling down the stairs. She explains she was carrying her dog down the stairs when she missed a step and slid down the remaining stairs. Pt landed on her left side. She reports no head injury or LOC. Pt also c/o an abrasion on her right shin. Her associated pain is moderate. She states her pain is worse with movement. Her pain is described as sharp. Pt reports no vomiting since the incident. She is otherwise without complaint. PCP: Rosaura Chirinos MD    There are no other complaints, changes, or physical findings at this time. Current Outpatient Prescriptions   Medication Sig Dispense Refill    ALPRAZolam (XANAX) 0.5 mg tablet Take 0.5 mg by mouth.  valsartan (DIOVAN) 320 mg tablet Take  by mouth daily.  ibuprofen (MOTRIN) 600 mg tablet Take 1 Tab by mouth every six (6) hours as needed for Pain. 20 Tab 0    naloxone (NARCAN) 4 mg/actuation nasal spray Use 1 spray intranasally into 1 nostril. Use a new Narcan nasal spray for subsequent doses and administer into alternating nostrils.  May repeat every 2 to 3 minutes as needed. Indications: OPIATE-INDUCED RESPIRATORY DEPRESSION 2 Each 0    HYDROcodone-acetaminophen (NORCO) 5-325 mg per tablet Take 1 Tab by mouth every six (6) hours as needed for Pain. Max Daily Amount: 4 Tabs. 20 Tab 0    ONETOUCH ULTRA TEST strip       lamoTRIgine (LAMICTAL) 25 mg tablet Take 1 Tab by mouth two (2) times a day. 60 Tab 3    lamoTRIgine (LAMICTAL) 100 mg tablet Take 1 Tab by mouth two (2) times a day. 60 Tab 3    OSELTAMIVIR PHOSPHATE (OSELTAMIVIR PO) 75 mg.      fluticasone (FLONASE) 50 mcg/actuation nasal spray 2 Sprays by Both Nostrils route daily.  valsartan (DIOVAN) 160 mg tablet Take  by mouth daily.  glipiZIDE SR (GLUCOTROL XL) 5 mg CR tablet Take 5 mg by mouth daily.  metFORMIN ER (GLUCOPHAGE XR) 500 mg tablet       diltiazem CD (CARTIA XT) 240 mg ER capsule Take 240 mg by mouth daily.  ASPIRIN PO Take 81 mg by mouth daily.  esomeprazole (NEXIUM) 20 mg capsule Take 40 mg by mouth daily.  cholecalciferol (VITAMIN D3) 1,000 unit tablet Take 1,000 Units by mouth daily.  raloxifene (EVISTA) 60 mg tablet Take 60 mg by mouth daily.  estradiol (VAGIFEM) 10 mcg tab vaginal tablet Insert 10 mcg into vagina two (2) days a week.  fenofibrate 160 mg Tab Take 1 Tab by mouth nightly.          Past History     Past Medical History:  Past Medical History:   Diagnosis Date    Arthritis     Breast disorder 03/24/2011    fibrocystic breasts    Chest pain, unspecified 11/10/2010    Chronic pain     abdomen/stomach/constipation    Essential hypertension, benign 11/10/2010    Family history of cardiovascular disease 11/10/2010    Fibromyalgia     Genital herpes     GERD (gastroesophageal reflux disease)     Hypertension     Mixed hyperlipidemia 11/10/2010    Nausea & vomiting     Nonspecific abnormal electrocardiogram (ECG) (EKG) 11/10/2010    Osteopenia     Palpitations 11/10/2010    Sleep apnea     does not sleep with machine but carries the dx        Past Surgical History:  Past Surgical History:   Procedure Laterality Date    BREAST SURGERY PROCEDURE UNLISTED      breast bxs - benign    HX CATARACT REMOVAL      bilateral    HX GI      EGD x3 - polyp removed/still has 2 polyps    HX GYN      ovarian cystectomy-left     HX HEENT      left chest cystectomy    HX HYSTERECTOMY      HX ORTHOPAEDIC      left arm cystectomy-  age 12   [facility]      \"entwined nerves\" removed from above right ankle    HX SALPINGO-OOPHORECTOMY      HX TONSILLECTOMY      HX TUBAL LIGATION         Family History:  Family History   Problem Relation Age of Onset    Post-op Nausea/Vomiting Mother     Heart Attack Mother     Cancer Paternal Aunt      breast    Stroke Father        Social History:  Social History   Substance Use Topics    Smoking status: Former Smoker     Packs/day: 0.50    Smokeless tobacco: Never Used      Comment: quit 2 1/2 years ago - was an on and off smoker then    Alcohol use Yes      Comment: occas       Allergies: Allergies   Allergen Reactions    Augmentin [Amoxicillin-Pot Clavulanate] Diarrhea and Nausea and Vomiting    Darvocet A500 [Propoxyphene N-Acetaminophen] Other (comments)     Heart flutters    Doxycycline Other (comments)     Stomach pain and diarrhea. Has taken since without problems.  Gentamicin Swelling    Percocet [Oxycodone-Acetaminophen] Rash and Itching    Stelazine Swelling     Tongue swelling    Sulfa (Sulfonamide Antibiotics) Hives    Sumycin [Tetracycline] Other (comments)     Tongue tingling    Zegerid [Omeprazole-Sodium Bicarbonate] Unknown (comments)     Takes nexium without problems, but if she takes zegerid, she has nausea and sweating. Review of Systems   Review of Systems   Constitutional: Negative. Negative for fever. HENT: Negative. Eyes: Negative. Respiratory: Negative. Cardiovascular: Negative. Gastrointestinal: Negative.   Negative for constipation, diarrhea, nausea and vomiting. Denies liver disease   Endocrine:        Denies thyroid disease   Genitourinary: Negative. Negative for dysuria. Denies kidney disease   Musculoskeletal: Positive for arthralgias (bilateral ribs, left ankle). Skin: Positive for wound (abrasion to right shin). Neurological: Negative. All other systems reviewed and are negative. Physical Exam   Physical Exam   Constitutional: She is oriented to person, place, and time. She appears well-developed and well-nourished. No distress. HENT:   Head: Normocephalic and atraumatic. Right Ear: External ear normal.   Left Ear: External ear normal.   Nose: Nose normal.   Mouth/Throat: Oropharynx is clear and moist. No oropharyngeal exudate. Eyes: Conjunctivae and EOM are normal. Pupils are equal, round, and reactive to light. Right eye exhibits no discharge. Left eye exhibits no discharge. No scleral icterus. Neck: Normal range of motion. Neck supple. No tracheal deviation present. Cardiovascular: Normal rate, regular rhythm, normal heart sounds and intact distal pulses. Exam reveals no gallop and no friction rub. No murmur heard. Pulmonary/Chest: Effort normal and breath sounds normal. No respiratory distress. She has no wheezes. She has no rales. She exhibits no tenderness. Abdominal: Soft. Bowel sounds are normal. She exhibits no distension and no mass. There is no tenderness. There is no rebound and no guarding. Musculoskeletal: She exhibits tenderness. She exhibits no edema. Tenderness to bilateral anterior ribs. Tenderness to anterior left ankle. Pelvis is stable. No bony tenderness to knees. Lymphadenopathy:     She has no cervical adenopathy. Neurological: She is alert and oriented to person, place, and time. No cranial nerve deficit. Neurovascularly intact distally. Skin: Skin is warm and dry. No rash noted. No erythema. Capillary refill < 3 seconds.   Superficial linear abrasion and contusion to anterior right leg. Psychiatric: She has a normal mood and affect. Her behavior is normal.   Nursing note and vitals reviewed. Diagnostic Study Results     Radiologic Studies -   XR RIBS BI W PA CHEST 4 VS   Final Result   EXAM:  XR RIBS BI W PA CHEST 4 VS     INDICATION:   fall rib pain  Additional history: Ground-level fall down stairs. Left-sided rib pain. COMPARISON: None.     FINDINGS: A frontal radiograph of the chest and 3 oblique views of the both ribs  demonstrate cortical irregularity in the posterior and lateral, right 6th rib. There is no pneumothorax or pleural effusion.     IMPRESSION  IMPRESSION:    1. Possible nondisplaced right 6th rib fracture. XR ANKLE LT MIN 3 V   Final Result   XR ANKLE LT MIN 3 V, XR FOOT LT MIN 3 V ,4/19/2018 11:05 PM  INDICATION:   Additional history: Ground level fall downstairs resulting left ankle pain and  swelling. COMPARISON: None  . FINDINGS:  ANKLE: 3 views  The bones and joints of the ankle are unremarkable. The joint  spaces and alignment are maintained. Slight soft tissue swelling anteriorly  . FOOT: 3 views  Minimal cortical irregularity over the distal, anterior talus. No visible  malalignment.     IMPRESSION  IMPRESSION:  1. Likely small avulsion fracture off the distal, anterior talus     XR FOOT LT MIN 3 V   Final Result   XR ANKLE LT MIN 3 V, XR FOOT LT MIN 3 V ,4/19/2018 11:05 PM  INDICATION:   Additional history: Ground level fall downstairs resulting left ankle pain and  swelling. COMPARISON: None  . FINDINGS:  ANKLE: 3 views  The bones and joints of the ankle are unremarkable. The joint  spaces and alignment are maintained. Slight soft tissue swelling anteriorly  . FOOT: 3 views  Minimal cortical irregularity over the distal, anterior talus. No visible  malalignment.     IMPRESSION  IMPRESSION:  1.  Likely small avulsion fracture off the distal, anterior talus     XR RIBS RT UNI 2 V    (Results Pending)     Medical Decision Making   I am the first provider for this patient. I reviewed the vital signs, available nursing notes, past medical history, past surgical history, family history and social history. Vital Signs-Reviewed the patient's vital signs. Patient Vitals for the past 12 hrs:   Pulse Resp BP SpO2   04/19/18 2119 78 18 152/82 97 %     Records Reviewed: Nursing Notes and Old Medical Records    Provider Notes (Medical Decision Making):   DDx: sprain, strain, fracture, contusion, abrasion     ED Course:   Initial assessment performed. The patients presenting problems have been discussed, and they are in agreement with the care plan formulated and outlined with them. I have encouraged them to ask questions as they arise throughout their visit. 1:11 AM  Patient is not having an allergic reaction to the narcotic pain medicine. Procedure Note - Splint Placement:  1:20 AM  Performed by: Brenda Prince  Neurovascularly intact prior to tx. An Orthoglass posterior short leg splint was placed on pt's left foot. Joint was placed in neutral position. Neurovascularly intact after tx. The procedure took 1-15 minutes, and pt tolerated well. Disposition:  DISCHARGE NOTE  1:26 AM  The patient has been re-evaluated and is ready for discharge. Reviewed available results with patient. Counseled patient on diagnosis and care plan. Patient has expressed understanding, and all questions have been answered. Patient agrees with plan and agrees to follow up as recommended, or return to the ED if their symptoms worsen. Discharge instructions have been provided and explained to the patient, along with reasons to return to the ED. PLAN:  1. Current Discharge Medication List      START taking these medications    Details   ibuprofen (MOTRIN) 600 mg tablet Take 1 Tab by mouth every six (6) hours as needed for Pain. Qty: 20 Tab, Refills: 0      naloxone (NARCAN) 4 mg/actuation nasal spray Use 1 spray intranasally into 1 nostril. Use a new Narcan nasal spray for subsequent doses and administer into alternating nostrils. May repeat every 2 to 3 minutes as needed. Indications: OPIATE-INDUCED RESPIRATORY DEPRESSION  Qty: 2 Each, Refills: 0      HYDROcodone-acetaminophen (NORCO) 5-325 mg per tablet Take 1 Tab by mouth every six (6) hours as needed for Pain. Max Daily Amount: 4 Tabs. Qty: 20 Tab, Refills: 0    Associated Diagnoses: Closed nondisplaced fracture of dome of left talus, initial encounter; Closed fracture of one rib of right side, initial encounter           2. Follow-up Information     Follow up With Details Comments Contact Info    Stephen Adame MD Schedule an appointment as soon as possible for a visit  [place] [id number] [facility],[address]  [facility]  [phone]      [facility] EMERGENCY DEPT  If symptoms worsen [address] [place] [address]  [phone]        Return to ED if worse     Diagnosis     Clinical Impression:   1. Closed nondisplaced fracture of dome of left talus, initial encounter    2. Closed fracture of one rib of right side, initial encounter    3. Abrasion    4. Contusion of right lower leg, initial encounter        Attestations:    Attestation Note:  This note is prepared by RUBIN [facility], acting as Scribe for [employer]    CORIN [place]: The scribe's documentation has been prepared under my direction and personally reviewed by me in its entirety. I confirm that the note above accurately reflects all work, treatment, procedures, and medical decision making performed by me.

## 2018-06-07 ENCOUNTER — OFFICE VISIT (OUTPATIENT)
Dept: BEHAVIORAL/MENTAL HEALTH CLINIC | Age: 63
End: 2018-06-07

## 2018-06-07 VITALS
SYSTOLIC BLOOD PRESSURE: 122 MMHG | DIASTOLIC BLOOD PRESSURE: 56 MMHG | HEIGHT: 62 IN | TEMPERATURE: 87 F | BODY MASS INDEX: 30.91 KG/M2 | WEIGHT: 168 LBS

## 2018-06-07 DIAGNOSIS — F32.2 SEVERE MAJOR DEPRESSION WITHOUT PSYCHOTIC FEATURES (HCC): ICD-10-CM

## 2018-06-07 DIAGNOSIS — F41.9 ANXIETY DISORDER, UNSPECIFIED TYPE: Primary | ICD-10-CM

## 2018-06-07 PROBLEM — F33.41 RECURRENT MAJOR DEPRESSIVE DISORDER, IN PARTIAL REMISSION (HCC): Status: RESOLVED | Noted: 2017-12-06 | Resolved: 2018-06-07

## 2018-06-07 RX ORDER — DULOXETIN HYDROCHLORIDE 30 MG/1
30 CAPSULE, DELAYED RELEASE ORAL
Qty: 30 CAP | Refills: 1 | Status: SHIPPED | OUTPATIENT
Start: 2018-06-07 | End: 2018-08-07 | Stop reason: SINTOL

## 2018-06-07 RX ORDER — ALPRAZOLAM 0.5 MG/1
0.5 TABLET ORAL 2 TIMES DAILY
Qty: 60 TAB | Refills: 1 | Status: SHIPPED | OUTPATIENT
Start: 2018-06-07 | End: 2018-08-07 | Stop reason: SDUPTHER

## 2018-06-07 NOTE — MR AVS SNAPSHOT
Yahir Johnson [address] [facility] 
[phone] Patient: Manas Cancino MRN: [id number] Visit Information Date & Time Provider Department Dept. Phone Encounter #  
 6/7/2018  3:00 PM Nisha Perdomo, [address] [facility] [phone] [id number] Follow-up Instructions Return in about 2 months (around 8/7/2018). Upcoming Health Maintenance Date Due Hepatitis C Screening 1955 DTaP/Tdap/Td series (1 - Tdap) 1/28/1975 PAP AKA CERVICAL CYTOLOGY 1/28/1975 FOBT Q 1 YEAR AGE 50-75 1/28/2004 BREAST CANCER SCRN MAMMOGRAM 7/7/2011 ZOSTER VACCINE AGE 60> 11/28/2013 Influenza Age 5 to Adult 8/1/2018 Allergies as of 6/7/2018  Review Complete On: 6/7/2018 By: Israel Alba Severity Noted Reaction Type Reactions Augmentin [Amoxicillin-pot Clavulanate]  07/22/2016    Diarrhea, Nausea and Vomiting Darvocet A500 [Propoxyphene N-acetaminophen]  11/10/2010    Other (comments) Heart flutters Doxycycline  11/10/2010    Other (comments) Stomach pain and diarrhea. Has taken since without problems. Gentamicin  07/22/2016    Swelling Percocet [Oxycodone-acetaminophen]  11/10/2010    Rash, Itching Stelazine  03/10/2011    Swelling Tongue swelling Sulfa (Sulfonamide Antibiotics)  11/10/2010    Hives Sumycin [Tetracycline]  03/10/2011    Other (comments) Tongue tingling Zegerid [Omeprazole-sodium Bicarbonate]  11/10/2010    Unknown (comments) Takes nexium without problems, but if she takes zegerid, she has nausea and sweating. Current Immunizations  Never Reviewed No immunizations on file. Not reviewed this visit You Were Diagnosed With   
  
 Codes Comments Anxiety disorder, unspecified type    -  Primary ICD-10-CM: F41.9 ICD-9-CM: 300.00 Severe major depression without psychotic features ([facility]     ICD-10-CM: F32.2 ICD-9-CM: 296.23 Vitals BP Temp Height(growth percentile) Weight(growth percentile) BMI OB Status 122/56 (!) 87 °F (30.6 °C) 5' 2\" (1.575 m) 168 lb (76.2 kg) 30.73 kg/m2 Hysterectomy Smoking Status Former Smoker Vitals History BMI and BSA Data Body Mass Index Body Surface Area 30.73 kg/m 2 1.83 m 2 Preferred Pharmacy Pharmacy Name Phone [facility] [address] [place] [phone] Your Updated Medication List  
  
   
This list is accurate as of 6/7/18  3:10 PM.  Always use your most recent med list.  
  
  
  
  
 ALPRAZolam 0.5 mg tablet Commonly known as:  Alona Hopes Take 1 Tab by mouth two (2) times a day. Max Daily Amount: 1 mg. ASPIRIN PO Take 81 mg by mouth daily. CARTIA  mg ER capsule Generic drug:  dilTIAZem CD Take 240 mg by mouth daily. DULoxetine 30 mg capsule Commonly known as:  CYMBALTA Take 1 Cap by mouth daily (after breakfast). estradiol 10 mcg Tab vaginal tablet Commonly known as:  [place] [employer] Insert 10 mcg into vagina two (2) days a week. fenofibrate 160 mg tablet Commonly known as:  LOFIBRA Take 1 Tab by mouth nightly. FLONASE 50 mcg/actuation nasal spray Generic drug:  fluticasone 2 Sprays by Both Nostrils route daily. glipiZIDE SR 5 mg CR tablet Commonly known as:  GLUCOTROL XL Take 5 mg by mouth daily. HYDROcodone-acetaminophen 5-325 mg per tablet Commonly known as:  Karolina Dimes Take 1 Tab by mouth every six (6) hours as needed for Pain. Max Daily Amount: 4 Tabs. ibuprofen 600 mg tablet Commonly known as:  MOTRIN Take 1 Tab by mouth every six (6) hours as needed for Pain. * lamoTRIgine 25 mg tablet Commonly known as: LaMICtal  
Take 1 Tab by mouth two (2) times a day. * lamoTRIgine 100 mg tablet Commonly known as: LaMICtal  
Take 1 Tab by mouth two (2) times a day. metFORMIN  mg tablet Commonly known as:  GLUCOPHAGE XR  
  
 naloxone 4 mg/actuation nasal spray Commonly known as:  [employer] Use 1 spray intranasally into 1 nostril. Use a new Narcan nasal spray for subsequent doses and administer into alternating nostrils. May repeat every 2 to 3 minutes as needed. Indications: OPIATE-INDUCED RESPIRATORY DEPRESSION NexIUM 20 mg capsule Generic drug:  esomeprazole Take 40 mg by mouth daily. ONETOUCH ULTRA TEST strip Generic drug:  glucose blood VI test strips  
  
 raloxifene 60 mg tablet Commonly known as:  EVISTA Take 60 mg by mouth daily. valsartan 320 mg tablet Commonly known as:  DIOVAN Take  by mouth daily. VITAMIN D3 1,000 unit tablet Generic drug:  cholecalciferol Take 1,000 Units by mouth daily. * Notice: This list has 2 medication(s) that are the same as other medications prescribed for you. Read the directions carefully, and ask your doctor or other care provider to review them with you. Prescriptions Printed Refills ALPRAZolam (XANAX) 0.5 mg tablet 1 Sig: Take 1 Tab by mouth two (2) times a day. Max Daily Amount: 1 mg. Class: Print Route: Oral  
  
Prescriptions Sent to Pharmacy Refills DULoxetine (CYMBALTA) 30 mg capsule 1 Sig: Take 1 Cap by mouth daily (after breakfast). Class: Normal  
 Pharmacy: [facility] [address] [place] [address] Dr Av[place], [address] [place] [address] #: [phone] Route: Oral  
  
Follow-up Instructions Return in about 2 months (around 8/7/2018). Introducing [facility]! [employer] introduces [employer] patient portal. Now you can access parts of your medical record, email your doctor's office, and request medication refills online. 1. In your internet browser, go to [email or web address]. [employer] 2. Click on the First Time User? Click Here link in the Sign In box. You will see the New Member Sign Up page. 3. Enter your [employer] Access Code exactly as it appears below. You will not need to use this code after youve completed the sign-up process. If you do not sign up before the expiration date, you must request a new code. · [employer] Access Code: [email or web address] Expires: 8/11/2018  5:25 AM 
 
4. Enter the last four digits of your Social Security Number (xxxx) and Date of Birth (mm/dd/yyyy) as indicated and click Submit. You will be taken to the next sign-up page. 5. Create a [employer] ID. This will be your [employer] login ID and cannot be changed, so think of one that is secure and easy to remember. 6. Create a [employer] password. You can change your password at any time. 7. Enter your Password Reset Question and Answer. This can be used at a later time if you forget your password. 8. Enter your e-mail address. You will receive e-mail notification when new information is available in [address] 9. Click Sign Up. You can now view and download portions of your medical record. 10. Click the Download Summary menu link to download a portable copy of your medical information. If you have questions, please visit the Frequently Asked Questions section of the [employer] website. Remember, [employer] is NOT to be used for urgent needs. For medical emergencies, dial 911. Now available from your iPhone and Android! Please provide this summary of care documentation to your next provider. Your primary care clinician is listed as Jean-Paul Myles. If you have any questions after today's visit, please call [phone].

## 2018-06-07 NOTE — PROGRESS NOTES
Psychiatric Outpatient Progress Note    Account Number:  [id number]  Name: Aldo Case    SUBJECTIVE:   CHIEF COMPLAINT:  Aldo Case is a 58 y.o. , White female and was seen today for her first follow-up of psychiatric condition and psychotropic medication management with me as her previous psychiatrist, Dr. Dany Hernandez, left our practice. She was last seen by Dr. Dany Hernandez on 3/13/18. Pt was initially evaluated by Dr. Dany Hernandez on 7/28/17. HPI:    April Matosbartnitin reports the following psychiatric symptoms:  depression, anxiety and mood swings. The symptoms have been present for years and are of moderate severity. The symptoms occur frequently. Patient was seen today for her first follow-up appointment with me. She was mildly guarded. Appeared anxious with limited social interaction. Her self-care is good. She reported that she is doing okay on her current psychotropic medication, including Lamictal and Xanax, but continues to have some crying spells. Later on she did acknowledge feeling more depressed. She is not on any antidepressant medication at this time and is very reluctant to take more medications. She reported that during last 5 weeks she had multiple stressors including being victim of phishing  scam, problem with 's Medicare, significant argument and discord with son-in-law. 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 Patient denies any psychotic or manic symptoms. She reported that she is sleeping and eating well. She has not put on any more weight. She is not very physically active. Patient reported that her weight is stable. Her BMI is 31. Her blood pressure is within normal limits. She reported that her blood sugars running up and down. She reported that she is compliant with all her medications.        SCALES: (today)  MDQ: 7 - + for mood swings  HAM-A: 27 - moderate anxiety  GDS: 12/15 - moderate depression  MOCA: 24/30 - MCI     History of Present Illness by Dr. Paradise Omalley (7/28/17): Zunilda Reagan is a 60 y.o. female who presented today to establish her OP psychiatric care. Pt gives long history of depression, starting since her childhood, states she was a lonely child, had no friends. As long as she remembers she has always been sad and depressed. She started seeking help for her depression in her 35s. She saw multiple psychiatrist since then, her last one being Dr. Kin Vicente whom she saw about 6-7 yrs ago. During this time period she took multiple psych meds, none of them worked. She started taking Celexa through her PCP for the last several yrs. Although she is on full therapeutic dose of Celexa, she still feels depressed, feels lethargic, tired, does not want to do anything, feels hopeless and helpless, feels worthless, has poor focus and concentration. Does not have any active suicidal thoughts but wishes that she was never born. Not sleeping well, appetite is off. She feels that her anxiety is bad again. She denies any YESSENIA, denies any psychotic or manic symptoms, denies any OCD symptoms. Does verbalize having relationship problems with her  of 39 yrs, from whom she was  for 2 yrs in the past, but got back together later. Contributing factors include: multiple    Patient denies SI/HI/SIB. Side Effects:  none      Fam/Soc Hx (from [place] with updates):  Pt was brought up by both parents, there was no abuse reported, pt reports being a shy child, had no friends, felt lonely, youngest of 1 sister and 2 brothers, completed HS and some college, worked in [employer] for 16 yrs, took early detention to take care of her mom who passed away a yr ago. Pt is  for the last 39 yrs, has 2 grown children, lives with her , was  from her  in the past for 2 yrs. Drinks alcohol occasionally, does report smoking marijuana once in the past. Mother with depression, daughter with depression.       REVIEW OF SYSTEMS:  Constitutional: positive for fatigue  Eyes: positive for contacts/glasses  Ears, nose, mouth, throat, and face: negative for hearing loss and tinnitus  Respiratory: negative for cough or wheezing  Cardiovascular: negative for chest pain, fatigue  Gastrointestinal: negative for reflux symptoms and constipation  Genitourinary:negative for frequency and urinary incontinence  Musculoskeletal:negative for arthralgias  Neurological: positive for memory problems  Behavioral/Psych: positive for anxiety, depression, mood swings and sleep disturbance, negative for SI or HI  Endocrine: positive for diabetic symptoms including polyuria and polydipsia    Visit Vitals    /56    Temp (!) 87 °F (30.6 °C)    Ht 5' 2\" (1.575 m)    Wt 76.2 kg (168 lb)    BMI 30.73 kg/m2       OBJECTIVE:                 Mental Status exam: WNL except for      Sensorium  oriented to time, place and person   Relations cooperative and passive    Eye Contact    appropriate   Appearance:  age appropriate and casually dressed   Motor Behavior/Gait:  within normal limits   Speech:  normal pitch and normal volume   Thought Process: goal directed and logical   Thought Content free of delusions and free of hallucinations   Suicidal ideations none   Homicidal ideations none   Mood:  depressed   Affect:  anxious   Memory recent  impaired   Memory remote:  adequate   Concentration:  adequate   Abstraction:  concrete   Insight:  fair   Reliability fair   Judgment:  fair       MEDICAL DECISION MAKING  Data: pertinent labs, imaging, medical records and diagnostic tests reviewed and incorporated in diagnosis and treatment plan    Allergies   Allergen Reactions    Augmentin [Amoxicillin-Pot Clavulanate] Diarrhea and Nausea and Vomiting    Darvocet A500 [Propoxyphene N-Acetaminophen] Other (comments)     Heart flutters    Doxycycline Other (comments)     Stomach pain and diarrhea. Has taken since without problems.     Gentamicin Swelling    Percocet [Oxycodone-Acetaminophen] Rash and Itching    Stelazine Swelling     Tongue swelling    Sulfa (Sulfonamide Antibiotics) Hives    Sumycin [Tetracycline] Other (comments)     Tongue tingling    Zegerid [Omeprazole-Sodium Bicarbonate] Unknown (comments)     Takes nexium without problems, but if she takes zegerid, she has nausea and sweating. Current Outpatient Prescriptions   Medication Sig Dispense Refill    DULoxetine (CYMBALTA) 30 mg capsule Take 1 Cap by mouth daily (after breakfast). 30 Cap 1    ALPRAZolam (XANAX) 0.5 mg tablet Take 1 Tab by mouth two (2) times a day. Max Daily Amount: 1 mg. 60 Tab 1    ibuprofen (MOTRIN) 600 mg tablet Take 1 Tab by mouth every six (6) hours as needed for Pain. 20 Tab 0    naloxone (NARCAN) 4 mg/actuation nasal spray Use 1 spray intranasally into 1 nostril. Use a new Narcan nasal spray for subsequent doses and administer into alternating nostrils. May repeat every 2 to 3 minutes as needed. Indications: OPIATE-INDUCED RESPIRATORY DEPRESSION 2 Each 0    HYDROcodone-acetaminophen (NORCO) 5-325 mg per tablet Take 1 Tab by mouth every six (6) hours as needed for Pain. Max Daily Amount: 4 Tabs. 20 Tab 0    ONETOUCH ULTRA TEST strip       lamoTRIgine (LAMICTAL) 25 mg tablet Take 1 Tab by mouth two (2) times a day. 60 Tab 3    lamoTRIgine (LAMICTAL) 100 mg tablet Take 1 Tab by mouth two (2) times a day. 60 Tab 3    fluticasone (FLONASE) 50 mcg/actuation nasal spray 2 Sprays by Both Nostrils route daily.  glipiZIDE SR (GLUCOTROL XL) 5 mg CR tablet Take 5 mg by mouth daily.  metFORMIN ER (GLUCOPHAGE XR) 500 mg tablet       diltiazem CD (CARTIA XT) 240 mg ER capsule Take 240 mg by mouth daily.  esomeprazole (NEXIUM) 20 mg capsule Take 40 mg by mouth daily.  cholecalciferol (VITAMIN D3) 1,000 unit tablet Take 1,000 Units by mouth daily.  estradiol (VAGIFEM) 10 mcg tab vaginal tablet Insert 10 mcg into vagina two (2) days a week.       raloxifene (EVISTA) 60 mg tablet Take 60 mg by mouth daily.  fenofibrate 160 mg Tab Take 1 Tab by mouth nightly.  valsartan (DIOVAN) 320 mg tablet Take  by mouth daily.  ASPIRIN PO Take 81 mg by mouth daily. Problems addressed today:    ICD-10-CM ICD-9-CM    1. Anxiety disorder, unspecified type F41.9 300.00 DULoxetine (CYMBALTA) 30 mg capsule      ALPRAZolam (XANAX) 0.5 mg tablet   2. Severe major depression without psychotic features ([facility] F32.2 296.23 DULoxetine (CYMBALTA) 30 mg capsule      ALPRAZolam (XANAX) 0.5 mg tablet       Assessment:   Antonia Harry  is a 58 y.o.  female  Is partially responding to treatment. Symptoms are marginally stable. Patient denies SI/HI/SIB. No evidence of AH/VH or delusions. Risk Scoring- chronic illnesses and prescription drug management    Treatment Plan:  1. Medications:          Medication Changes/Adjustments: Start Cymbalta 30 mg 1 p.o. daily after breakfast, continue Lamictal and Xanax in the current dosages. Patient is very reluctant to get off of Xanax despite the education that it may be interfering with her memory function. Current Outpatient Prescriptions   Medication Sig Dispense Refill    DULoxetine (CYMBALTA) 30 mg capsule Take 1 Cap by mouth daily (after breakfast). 30 Cap 1    ALPRAZolam (XANAX) 0.5 mg tablet Take 1 Tab by mouth two (2) times a day. Max Daily Amount: 1 mg. 60 Tab 1    ibuprofen (MOTRIN) 600 mg tablet Take 1 Tab by mouth every six (6) hours as needed for Pain. 20 Tab 0    naloxone (NARCAN) 4 mg/actuation nasal spray Use 1 spray intranasally into 1 nostril. Use a new Narcan nasal spray for subsequent doses and administer into alternating nostrils. May repeat every 2 to 3 minutes as needed. Indications: OPIATE-INDUCED RESPIRATORY DEPRESSION 2 Each 0    HYDROcodone-acetaminophen (NORCO) 5-325 mg per tablet Take 1 Tab by mouth every six (6) hours as needed for Pain. Max Daily Amount: 4 Tabs.  20 Tab 0    ONETOUCH ULTRA TEST strip       lamoTRIgine (LAMICTAL) 25 mg tablet Take 1 Tab by mouth two (2) times a day. 60 Tab 3    lamoTRIgine (LAMICTAL) 100 mg tablet Take 1 Tab by mouth two (2) times a day. 60 Tab 3    fluticasone (FLONASE) 50 mcg/actuation nasal spray 2 Sprays by Both Nostrils route daily.  glipiZIDE SR (GLUCOTROL XL) 5 mg CR tablet Take 5 mg by mouth daily.  metFORMIN ER (GLUCOPHAGE XR) 500 mg tablet       diltiazem CD (CARTIA XT) 240 mg ER capsule Take 240 mg by mouth daily.  esomeprazole (NEXIUM) 20 mg capsule Take 40 mg by mouth daily.  cholecalciferol (VITAMIN D3) 1,000 unit tablet Take 1,000 Units by mouth daily.  estradiol (VAGIFEM) 10 mcg tab vaginal tablet Insert 10 mcg into vagina two (2) days a week.  raloxifene (EVISTA) 60 mg tablet Take 60 mg by mouth daily.  fenofibrate 160 mg Tab Take 1 Tab by mouth nightly.  valsartan (DIOVAN) 320 mg tablet Take  by mouth daily.  ASPIRIN PO Take 81 mg by mouth daily. The following regarding medications was addressed:    (The risks and benefits of the proposed medication; the potential medication side effects ie    dry mouth, weight gain, GI upset, headache; patient given opportunity to ask questions)       2. Counseling and coordination of care including instructions for treatment, risks/benefits, risk factor reduction and patient/family education. She agrees with the plan. Patient instructed to call with any side effects, questions or issues. 3.  Patient was provided supportive counseling for her significant current stressors. Patient is not interested in any psychotherapy and has stopped seeing Dasia Hunter LCSW at [facility]. Patient was encouraged to walk daily and become more intellectually active. Patient was also educated on the cognitive side effect of Xanax.     PSYCHOTHERAPY:  approx 20 minutes  Type:  Supportive/Cognitive Behavioral/Solution Focused psychotherapy provided  Focus:     Current problems:   Occupational issues   Medical issues   Interpersonal conflicts - with AMY    Psychoeducation provided:  Psych medications    Treatment plan reviewed with patient-including diagnosis and medications    Worked on issues of denial & effects of Xanax dependency/use    Kandi Archer is marginally stable. Follow-up Disposition:  Return in about 2 months (around 8/7/2018).       Adriana Mcduffie MD  6/7/2018

## 2018-07-06 ENCOUNTER — TELEPHONE (OUTPATIENT)
Dept: BEHAVIORAL/MENTAL HEALTH CLINIC | Age: 63
End: 2018-07-06

## 2018-07-06 NOTE — TELEPHONE ENCOUNTER
Pt called to let you know she has stopped the Cymbalta per it is making her nervous and heart pounding, just fyi.

## 2018-07-16 DIAGNOSIS — F39 MOOD DISORDER (HCC): ICD-10-CM

## 2018-07-17 DIAGNOSIS — F39 MOOD DISORDER (HCC): ICD-10-CM

## 2018-07-17 RX ORDER — LAMOTRIGINE 25 MG/1
TABLET ORAL
Qty: 60 TAB | Refills: 0 | Status: SHIPPED | OUTPATIENT
Start: 2018-07-17 | End: 2018-07-17 | Stop reason: SDUPTHER

## 2018-07-17 RX ORDER — LAMOTRIGINE 25 MG/1
TABLET ORAL
Qty: 60 TAB | Refills: 0 | Status: SHIPPED | OUTPATIENT
Start: 2018-07-17 | End: 2018-08-07 | Stop reason: SDUPTHER

## 2018-07-17 RX ORDER — LAMOTRIGINE 100 MG/1
100 TABLET ORAL 2 TIMES DAILY
Qty: 60 TAB | Refills: 3 | Status: SHIPPED | OUTPATIENT
Start: 2018-07-17 | End: 2018-12-06 | Stop reason: SDUPTHER

## 2018-08-07 ENCOUNTER — OFFICE VISIT (OUTPATIENT)
Dept: BEHAVIORAL/MENTAL HEALTH CLINIC | Age: 63
End: 2018-08-07

## 2018-08-07 VITALS
HEART RATE: 71 BPM | HEIGHT: 62 IN | BODY MASS INDEX: 28.89 KG/M2 | WEIGHT: 157 LBS | DIASTOLIC BLOOD PRESSURE: 53 MMHG | SYSTOLIC BLOOD PRESSURE: 134 MMHG

## 2018-08-07 DIAGNOSIS — F41.9 ANXIETY DISORDER, UNSPECIFIED TYPE: ICD-10-CM

## 2018-08-07 DIAGNOSIS — F39 MOOD DISORDER (HCC): ICD-10-CM

## 2018-08-07 DIAGNOSIS — G47.52 REM SLEEP BEHAVIOR DISORDER: ICD-10-CM

## 2018-08-07 DIAGNOSIS — F32.2 SEVERE MAJOR DEPRESSION WITHOUT PSYCHOTIC FEATURES (HCC): Primary | ICD-10-CM

## 2018-08-07 RX ORDER — ALPRAZOLAM 0.5 MG/1
0.5 TABLET ORAL 2 TIMES DAILY
Qty: 60 TAB | Refills: 2 | Status: SHIPPED | OUTPATIENT
Start: 2018-08-07 | End: 2018-12-06 | Stop reason: SDUPTHER

## 2018-08-07 RX ORDER — BUSPIRONE HYDROCHLORIDE 5 MG/1
5 TABLET ORAL 2 TIMES DAILY
Qty: 60 TAB | Refills: 2 | Status: SHIPPED | OUTPATIENT
Start: 2018-08-07 | End: 2018-12-06 | Stop reason: SDUPTHER

## 2018-08-07 RX ORDER — AMLODIPINE BESYLATE 5 MG/1
10 TABLET ORAL DAILY
COMMUNITY

## 2018-08-07 RX ORDER — LAMOTRIGINE 25 MG/1
TABLET ORAL
Qty: 60 TAB | Refills: 2 | Status: SHIPPED | OUTPATIENT
Start: 2018-08-07 | End: 2018-12-06 | Stop reason: SDUPTHER

## 2018-08-07 NOTE — MR AVS SNAPSHOT
[address] 
[phone] Patient: Maury Mcneill MRN: [id number] XOC:4/54/2940 Visit Information Date & Time Provider Department Dept. Phone Encounter #  
 8/7/2018 10:00 AM Dunia De Jesus [place] [phone] [id number] Follow-up Instructions Return in about 2 months (around 10/7/2018). Upcoming Health Maintenance Date Due Hepatitis C Screening 1955 DTaP/Tdap/Td series (1 - Tdap) 1/28/1975 PAP AKA CERVICAL CYTOLOGY 1/28/1975 FOBT Q 1 YEAR AGE 50-75 1/28/2004 BREAST CANCER SCRN MAMMOGRAM 7/7/2011 ZOSTER VACCINE AGE 60> 11/28/2013 Influenza Age 5 to Adult 8/1/2018 Allergies as of 8/7/2018  Review Complete On: 8/7/2018 By: Sharri Slade Severity Noted Reaction Type Reactions Augmentin [Amoxicillin-pot Clavulanate]  07/22/2016    Diarrhea, Nausea and Vomiting Darvocet A500 [Propoxyphene N-acetaminophen]  11/10/2010    Other (comments) Heart flutters Doxycycline  11/10/2010    Other (comments) Stomach pain and diarrhea. Has taken since without problems. Gentamicin  07/22/2016    Swelling Percocet [Oxycodone-acetaminophen]  11/10/2010    Rash, Itching Stelazine  03/10/2011    Swelling Tongue swelling Sulfa (Sulfonamide Antibiotics)  11/10/2010    Hives Sumycin [Tetracycline]  03/10/2011    Other (comments) Tongue tingling Zegerid [Omeprazole-sodium Bicarbonate]  11/10/2010    Unknown (comments) Takes nexium without problems, but if she takes zegerid, she has nausea and sweating. Current Immunizations  Never Reviewed No immunizations on file. Not reviewed this visit You Were Diagnosed With   
  
 Codes Comments Severe major depression without psychotic features ([facility]    -  Primary ICD-10-CM: F32.2 ICD-9-CM: 296.23 Anxiety disorder, unspecified type     ICD-10-CM: F41.9 ICD-9-CM: 300.00 REM sleep behavior disorder     ICD-10-CM: G47.52 
ICD-9-CM: 327.42 Mood disorder ([facility]     ICD-10-CM: F39 
ICD-9-CM: 296.90 Vitals BP Pulse Height(growth percentile) Weight(growth percentile) BMI OB Status 134/53 71 5' 2\" (1.575 m) 157 lb (71.2 kg) 28.72 kg/m2 Hysterectomy Smoking Status Former Smoker BMI and BSA Data Body Mass Index Body Surface Area 28.72 kg/m 2 1.76 m 2 Preferred Pharmacy Pharmacy Name Phone Sergey Jin [address], [address]  [address] [phone] Your Updated Medication List  
  
   
This list is accurate as of 8/7/18 10:17 AM.  Always use your most recent med list.  
  
  
  
  
 ALPRAZolam 0.5 mg tablet Commonly known as:  Edgardo Beecham Take 1 Tab by mouth two (2) times a day. Max Daily Amount: 1 mg. amLODIPine 5 mg tablet Commonly known as:  Matthews Del Take 5 mg by mouth daily. ASPIRIN PO Take 81 mg by mouth daily. busPIRone 5 mg tablet Commonly known as:  BUSPAR Take 1 Tab by mouth two (2) times a day. estradiol 10 mcg Tab vaginal tablet Commonly known as:  Kunal Pont Insert 10 mcg into vagina two (2) days a week. fenofibrate 160 mg tablet Commonly known as:  LOFIBRA Take 1 Tab by mouth nightly. glipiZIDE SR 5 mg CR tablet Commonly known as:  GLUCOTROL XL Take 10 mg by mouth daily. ibuprofen 600 mg tablet Commonly known as:  MOTRIN Take 1 Tab by mouth every six (6) hours as needed for Pain. * lamoTRIgine 100 mg tablet Commonly known as: LaMICtal  
Take 1 Tab by mouth two (2) times a day. * lamoTRIgine 25 mg tablet Commonly known as: LaMICtal  
TAKE ONE TABLET BY MOUTH TWICE A DAY  
  
 naloxone 4 mg/actuation nasal spray Commonly known as:  [employer] Use 1 spray intranasally into 1 nostril.  Use a new Narcan nasal spray for subsequent doses and administer into alternating nostrils. May repeat every 2 to 3 minutes as needed. Indications: OPIATE-INDUCED RESPIRATORY DEPRESSION NexIUM 20 mg capsule Generic drug:  esomeprazole Take 40 mg by mouth daily. ONETOUCH ULTRA TEST strip Generic drug:  glucose blood VI test strips  
  
 raloxifene 60 mg tablet Commonly known as:  EVISTA Take 60 mg by mouth daily. valsartan 320 mg tablet Commonly known as:  DIOVAN Take  by mouth daily. VITAMIN D3 1,000 unit tablet Generic drug:  cholecalciferol Take 1,000 Units by mouth daily. * Notice: This list has 2 medication(s) that are the same as other medications prescribed for you. Read the directions carefully, and ask your doctor or other care provider to review them with you. Prescriptions Printed Refills ALPRAZolam (XANAX) 0.5 mg tablet 2 Sig: Take 1 Tab by mouth two (2) times a day. Max Daily Amount: 1 mg. Class: Print Route: Oral  
  
Prescriptions Sent to Pharmacy Refills  
 lamoTRIgine (LAMICTAL) 25 mg tablet 2 Sig: TAKE ONE TABLET BY MOUTH TWICE A DAY Class: Normal  
 Pharmacy: [address] [place], [address] [place] [address] Ph #: [phone]  
 busPIRone (BUSPAR) 5 mg tablet 2 Sig: Take 1 Tab by mouth two (2) times a day. Class: Normal  
 Pharmacy: [address] [place], [address]  [address] Ph #: [phone] Route: Oral  
  
Follow-up Instructions Return in about 2 months (around 10/7/2018). Introducing [facility]! [employer] introduces [employer] patient portal. Now you can access parts of your medical record, email your doctor's office, and request medication refills online. 1. In your internet browser, go to [email or web address] 2. Click on the First Time User? Click Here link in the Sign In box. You will see the New Member Sign Up page. 3. Enter your [employer] Access Code exactly as it appears below. You will not need to use this code after youve completed the sign-up process. If you do not sign up before the expiration date, you must request a new code. · [employer] Access Code: [email or web address] Expires: 8/11/2018  5:25 AM 
 
4. Enter the last four digits of your Social Security Number (xxxx) and Date of Birth (mm/dd/yyyy) as indicated and click Submit. You will be taken to the next sign-up page. 5. Create a [employer] ID. This will be your [employer] login ID and cannot be changed, so think of one that is secure and easy to remember. 6. Create a [employer] password. You can change your password at any time. 7. Enter your Password Reset Question and Answer. This can be used at a later time if you forget your password. 8. Enter your e-mail address. You will receive e-mail notification when new information is available in [address] 9. Click Sign Up. You can now view and download portions of your medical record. 10. Click the Download Summary menu link to download a portable copy of your medical information. If you have questions, please visit the Frequently Asked Questions section of the [employer] website. Remember, [employer] is NOT to be used for urgent needs. For medical emergencies, dial 911. Now available from your iPhone and Android! Please provide this summary of care documentation to your next provider. Your primary care clinician is listed as Evi Warner. If you have any questions after today's visit, please call [phone].

## 2018-08-07 NOTE — PROGRESS NOTES
Psychiatric Outpatient Progress Note    Account Number:  [id number]  Name: Romayne Kalata    SUBJECTIVE:   CHIEF COMPLAINT:  Romayne Kalata is a 58 y.o. , White female and was seen today for her 2 month follow-up of psychiatric condition and psychotropic medication management.      HPI:    Leann Damonbirgit reports the following psychiatric symptoms:  depression, anxiety and mood swings. The symptoms have been present for years and are of moderate severity. The symptoms occur frequently.     Patient has called on 7/6/18 and has reported that she is having palpitation with Cymbalta and has stopped taking it. It got better after stopping it. She reported that she is not doing any better. Appeared anxious with limited social interaction. Her self-care is good. Later on she did acknowledge feeling more depressed with frequent crying spells. She did not report any new stressors. Patient denies any psychotic or manic symptoms. She reported that she is sleeping and eating well. She is happy about her weight loss. She has joined Gym and has a . Patient has lost 11 lbs since last visit. Her BMI is 29. Her blood pressure is within normal limits. She reported that her blood sugars running up and down. She reported that she is compliant with all her medications.       Contributing factors include: multiple     Patient denies SI/HI/SIB.      Side Effects:  none       Fam/Soc Hx (from [place] with updates):  Pt was brought up by both parents, there was no abuse reported, pt reports being a shy child, had no friends, felt lonely, youngest of 1 sister and 2 brothers, completed HS and some college, worked in [employer] for 16 yrs, took early [employer] to take care of her mom who passed away a yr ago. Pt is  for the last 39 yrs, has 2 grown children, lives with her , was  from her  in the past for 2 yrs.  Drinks alcohol occasionally, does report smoking marijuana once in the past. Mother with depression, daughter with depression.       REVIEW OF SYSTEMS:  Constitutional: positive for fatigue, weight loss  Eyes: positive for contacts/glasses  Ears, nose, mouth, throat, and face: negative for hearing loss and tinnitus  Respiratory: negative for cough or wheezing  Cardiovascular: negative for chest pain, fatigue  Gastrointestinal: negative for reflux symptoms and constipation  Genitourinary:negative for frequency and urinary incontinence  Musculoskeletal:negative for arthralgias  Neurological: positive for mild memory problems  Behavioral/Psych: positive for anxiety, depression, mood swings and sleep disturbance, negative for SI or HI    Visit Vitals    /53    Pulse 71    Ht 5' 2\" (1.575 m)    Wt 71.2 kg (157 lb)    BMI 28.72 kg/m2     SCALES: (6/7/18)  MDQ: 7 - + for mood swings  HAM-A: 27 - moderate anxiety  GDS: 12/15 - moderate depression  MOCA: 24/30 - MCI     OBJECTIVE:                 Mental Status exam: WNL except for      Sensorium  oriented to time, place and person   Relations cooperative and passive    Eye Contact    appropriate   Appearance:  age appropriate and casually dressed   Motor Behavior/Gait:  within normal limits   Speech:  normal pitch and normal volume   Thought Process: goal directed and logical   Thought Content free of delusions and free of hallucinations   Suicidal ideations none   Homicidal ideations none   Mood:  euthymic   Affect:  anxious   Memory recent  adequate   Memory remote:  adequate   Concentration:  adequate   Abstraction:  concrete   Insight:  fair   Reliability fair   Judgment:  fair       MEDICAL DECISION MAKING  Data: pertinent labs, imaging, medical records and diagnostic tests reviewed and incorporated in diagnosis and treatment plan    Allergies   Allergen Reactions    Augmentin [Amoxicillin-Pot Clavulanate] Diarrhea and Nausea and Vomiting    Darvocet A500 [Propoxyphene N-Acetaminophen] Other (comments)     Heart flutters    Doxycycline Other (comments)     Stomach pain and diarrhea. Has taken since without problems.  Gentamicin Swelling    Percocet [Oxycodone-Acetaminophen] Rash and Itching    Stelazine Swelling     Tongue swelling    Sulfa (Sulfonamide Antibiotics) Hives    Sumycin [Tetracycline] Other (comments)     Tongue tingling    Zegerid [Omeprazole-Sodium Bicarbonate] Unknown (comments)     Takes nexium without problems, but if she takes zegerid, she has nausea and sweating. Current Outpatient Prescriptions   Medication Sig Dispense Refill    amLODIPine (NORVASC) 5 mg tablet Take 5 mg by mouth daily.  lamoTRIgine (LAMICTAL) 25 mg tablet TAKE ONE TABLET BY MOUTH TWICE A DAY 60 Tab 2    ALPRAZolam (XANAX) 0.5 mg tablet Take 1 Tab by mouth two (2) times a day. Max Daily Amount: 1 mg. 60 Tab 2    busPIRone (BUSPAR) 5 mg tablet Take 1 Tab by mouth two (2) times a day. 60 Tab 2    lamoTRIgine (LAMICTAL) 100 mg tablet Take 1 Tab by mouth two (2) times a day. 60 Tab 3    valsartan (DIOVAN) 320 mg tablet Take  by mouth daily.  ibuprofen (MOTRIN) 600 mg tablet Take 1 Tab by mouth every six (6) hours as needed for Pain. 20 Tab 0    glipiZIDE SR (GLUCOTROL XL) 5 mg CR tablet Take 10 mg by mouth daily.  ASPIRIN PO Take 81 mg by mouth daily.  esomeprazole (NEXIUM) 20 mg capsule Take 40 mg by mouth daily.  cholecalciferol (VITAMIN D3) 1,000 unit tablet Take 1,000 Units by mouth daily.  estradiol (VAGIFEM) 10 mcg tab vaginal tablet Insert 10 mcg into vagina two (2) days a week.  raloxifene (EVISTA) 60 mg tablet Take 60 mg by mouth daily.  fenofibrate 160 mg Tab Take 1 Tab by mouth nightly.  naloxone (NARCAN) 4 mg/actuation nasal spray Use 1 spray intranasally into 1 nostril. Use a new Narcan nasal spray for subsequent doses and administer into alternating nostrils. May repeat every 2 to 3 minutes as needed.   Indications: OPIATE-INDUCED RESPIRATORY DEPRESSION 2 Each 0    ONETOUCH ULTRA TEST strip Problems addressed today:    ICD-10-CM ICD-9-CM    1. Severe major depression without psychotic features (HCC) F32.2 296.23 ALPRAZolam (XANAX) 0.5 mg tablet   2. Anxiety disorder, unspecified type F41.9 300.00 ALPRAZolam (XANAX) 0.5 mg tablet   3. REM sleep behavior disorder G47.52 327.42    4. Mood disorder (HCC) F39 296.90 lamoTRIgine (LAMICTAL) 25 mg tablet       Assessment:   Lorena Bloom  is a 58 y.o.  female  is responding to treatment. Symptoms are stable. Patient denies SI/HI/SIB. No evidence of AH/VH or delusions. Risk Scoring- chronic illnesses and prescription drug management    Treatment Plan:  1. Medications:          Medication Changes/Adjustments: Start low dose Buspar. Continue Xanax and Lamictal in the current dosages. Current Outpatient Prescriptions   Medication Sig Dispense Refill    amLODIPine (NORVASC) 5 mg tablet Take 5 mg by mouth daily.  lamoTRIgine (LAMICTAL) 25 mg tablet TAKE ONE TABLET BY MOUTH TWICE A DAY 60 Tab 2    ALPRAZolam (XANAX) 0.5 mg tablet Take 1 Tab by mouth two (2) times a day. Max Daily Amount: 1 mg. 60 Tab 2    busPIRone (BUSPAR) 5 mg tablet Take 1 Tab by mouth two (2) times a day. 60 Tab 2    lamoTRIgine (LAMICTAL) 100 mg tablet Take 1 Tab by mouth two (2) times a day. 60 Tab 3    valsartan (DIOVAN) 320 mg tablet Take  by mouth daily.  ibuprofen (MOTRIN) 600 mg tablet Take 1 Tab by mouth every six (6) hours as needed for Pain. 20 Tab 0    glipiZIDE SR (GLUCOTROL XL) 5 mg CR tablet Take 10 mg by mouth daily.  ASPIRIN PO Take 81 mg by mouth daily.  esomeprazole (NEXIUM) 20 mg capsule Take 40 mg by mouth daily.  cholecalciferol (VITAMIN D3) 1,000 unit tablet Take 1,000 Units by mouth daily.  estradiol (VAGIFEM) 10 mcg tab vaginal tablet Insert 10 mcg into vagina two (2) days a week.  raloxifene (EVISTA) 60 mg tablet Take 60 mg by mouth daily.  fenofibrate 160 mg Tab Take 1 Tab by mouth nightly.       naloxone [facility]) 4 mg/actuation nasal spray Use 1 spray intranasally into 1 nostril. Use a new Narcan nasal spray for subsequent doses and administer into alternating nostrils. May repeat every 2 to 3 minutes as needed. Indications: OPIATE-INDUCED RESPIRATORY DEPRESSION 2 Each 0    ONETOUCH ULTRA TEST strip                     The following regarding medications was addressed:    (The risks and benefits of the proposed medication; the potential medication side effects ie    dry mouth, weight gain, GI upset, headache; patient given opportunity to ask questions)       2. Counseling and coordination of care including instructions for treatment, risks/benefits, risk factor reduction and patient/family education. She agrees with the plan. Patient instructed to call with any side effects, questions or issues. 3.  Pt was provided supportive counseling for her multiple family stressors and refractory depression/anciety. PSYCHOTHERAPY:  approx 20 minutes  Type:  Supportive/Cognitive Behavioral/Solution Focused psychotherapy provided  Focus:     Current problems:   Family stressors   Medical issues    Psychoeducation provided: psych medications    Treatment plan reviewed with patient-including diagnosis and medications    Christopher Ruffin is not progressing. Follow-up Disposition:  Return in about 2 months (around 10/7/2018).       Ned Peña MD  8/7/2018

## 2018-10-04 ENCOUNTER — OFFICE VISIT (OUTPATIENT)
Dept: BEHAVIORAL/MENTAL HEALTH CLINIC | Age: 63
End: 2018-10-04

## 2018-10-04 VITALS
SYSTOLIC BLOOD PRESSURE: 145 MMHG | BODY MASS INDEX: 28.52 KG/M2 | WEIGHT: 155 LBS | HEIGHT: 62 IN | DIASTOLIC BLOOD PRESSURE: 60 MMHG | HEART RATE: 76 BPM

## 2018-10-04 DIAGNOSIS — G47.52 REM SLEEP BEHAVIOR DISORDER: ICD-10-CM

## 2018-10-04 DIAGNOSIS — F41.9 ANXIETY DISORDER, UNSPECIFIED TYPE: ICD-10-CM

## 2018-10-04 DIAGNOSIS — F32.2 SEVERE MAJOR DEPRESSION WITHOUT PSYCHOTIC FEATURES (HCC): Primary | ICD-10-CM

## 2018-10-04 RX ORDER — CETIRIZINE HCL 10 MG
TABLET ORAL
COMMUNITY

## 2018-10-04 RX ORDER — PIOGLITAZONEHYDROCHLORIDE 15 MG/1
15 TABLET ORAL DAILY
COMMUNITY
End: 2022-05-13

## 2018-10-04 RX ORDER — IRBESARTAN 300 MG/1
300 TABLET ORAL
COMMUNITY

## 2018-10-04 NOTE — MR AVS SNAPSHOT
[address] [facility] 
[phone] Patient: Jean-Pierre Elam MRN: [id number] EHE:[birth year] Visit Information Date & Time Provider Department Dept. Phone Encounter #  
 10/4/2018 10:00 AM Rochelle Nissen, [phone] [id number] Upcoming Health Maintenance Date Due Hepatitis C Screening 1955 DTaP/Tdap/Td series (1 - Tdap) 1/28/1975 PAP AKA CERVICAL CYTOLOGY 1/28/1975 Shingrix Vaccine Age 50> (1 of 2) 1/28/2004 FOBT Q 1 YEAR AGE 50-75 1/28/2004 BREAST CANCER SCRN MAMMOGRAM 7/7/2011 Influenza Age 5 to Adult 8/1/2018 Allergies as of 10/4/2018  Review Complete On: 8/7/2018 By: Liliana Ford Severity Noted Reaction Type Reactions Augmentin [Amoxicillin-pot Clavulanate]  07/22/2016    Diarrhea, Nausea and Vomiting Darvocet A500 [Propoxyphene N-acetaminophen]  11/10/2010    Other (comments) Heart flutters Doxycycline  11/10/2010    Other (comments) Stomach pain and diarrhea. Has taken since without problems. Gentamicin  07/22/2016    Swelling Percocet [Oxycodone-acetaminophen]  11/10/2010    Rash, Itching Stelazine  03/10/2011    Swelling Tongue swelling Sulfa (Sulfonamide Antibiotics)  11/10/2010    Hives Sumycin [Tetracycline]  03/10/2011    Other (comments) Tongue tingling Zegerid [Omeprazole-sodium Bicarbonate]  11/10/2010    Unknown (comments) Takes nexium without problems, but if she takes zegerid, she has nausea and sweating. Current Immunizations  Never Reviewed No immunizations on file. Not reviewed this visit You Were Diagnosed With   
  
 Codes Comments Severe major depression without psychotic features ([facility]    -  Primary ICD-10-CM: F32.2 ICD-9-CM: 296.23 Anxiety disorder, unspecified type     ICD-10-CM: F41.9 ICD-9-CM: 300.00 REM sleep behavior disorder     ICD-10-CM: G47.52 
ICD-9-CM: 327.42 Vitals BP Pulse Height(growth percentile) Weight(growth percentile) BMI OB Status 145/60 76 5' 2\" (1.575 m) 155 lb (70.3 kg) 28.35 kg/m2 Hysterectomy Smoking Status Former Smoker Vitals History BMI and BSA Data Body Mass Index Body Surface Area  
 28.35 kg/m 2 1.75 m 2 Preferred Pharmacy Pharmacy Name Phone SHERLYN CHIN [facility] [address] Meghan Parsons [phone] Your Updated Medication List  
  
   
This list is accurate as of 10/4/18 10:16 AM.  Always use your most recent med list.  
  
  
  
  
 ALPRAZolam 0.5 mg tablet Commonly known as:  [place] Sox Take 1 Tab by mouth two (2) times a day. Max Daily Amount: 1 mg. amLODIPine 5 mg tablet Commonly known as:  Kevin Decant Take 5 mg by mouth daily. ASPIRIN PO Take 81 mg by mouth daily. busPIRone 5 mg tablet Commonly known as:  BUSPAR Take 1 Tab by mouth two (2) times a day. cetirizine 10 mg tablet Commonly known as:  ZYRTEC Take  by mouth.  
  
 estradiol 10 mcg Tab vaginal tablet Commonly known as:  Gabi Mulling Insert 10 mcg into vagina two (2) days a week. fenofibrate 160 mg tablet Commonly known as:  LOFIBRA Take 1 Tab by mouth nightly. glipiZIDE SR 5 mg CR tablet Commonly known as:  GLUCOTROL XL Take 10 mg by mouth daily. ibuprofen 600 mg tablet Commonly known as:  MOTRIN Take 1 Tab by mouth every six (6) hours as needed for Pain. irbesartan 300 mg tablet Commonly known as:  AVAPRO Take 300 mg by mouth nightly. * lamoTRIgine 100 mg tablet Commonly known as: LaMICtal  
Take 1 Tab by mouth two (2) times a day. * lamoTRIgine 25 mg tablet Commonly known as: LaMICtal  
TAKE ONE TABLET BY MOUTH TWICE A DAY  
  
 naloxone 4 mg/actuation nasal spray Commonly known as:  [employer]  
 Use 1 spray intranasally into 1 nostril. Use a new Narcan nasal spray for subsequent doses and administer into alternating nostrils. May repeat every 2 to 3 minutes as needed. Indications: OPIATE-INDUCED RESPIRATORY DEPRESSION NexIUM 20 mg capsule Generic drug:  esomeprazole Take 40 mg by mouth daily. ONETOUCH ULTRA TEST strip Generic drug:  glucose blood VI test strips  
  
 pioglitazone 15 mg tablet Commonly known as:  ACTOS Take 15 mg by mouth daily. raloxifene 60 mg tablet Commonly known as:  EVISTA Take 60 mg by mouth daily. VITAMIN D3 1,000 unit tablet Generic drug:  cholecalciferol Take 1,000 Units by mouth daily. * Notice: This list has 2 medication(s) that are the same as other medications prescribed for you. Read the directions carefully, and ask your doctor or other care provider to review them with you. Introducing [facility]! [employer] introduces [employer] patient portal. Now you can access parts of your medical record, email your doctor's office, and request medication refills online. 1. In your internet browser, go to [email or web address] 2. Click on the First Time User? Click Here link in the Sign In box. You will see the New Member Sign Up page. 3. Enter your EpiEP Access Code exactly as it appears below. You will not need to use this code after youve completed the sign-up process. If you do not sign up before the expiration date, you must request a new code. · EpiEP Access Code: [email or web address] Expires: 1/2/2019 10:16 AM 
 
4. Enter the last four digits of your Social Security Number (xxxx) and Date of Birth (mm/dd/yyyy) as indicated and click Submit. You will be taken to the next sign-up page. 5. Create a EpiEP ID. This will be your EpiEP login ID and cannot be changed, so think of one that is secure and easy to remember. 6. Create a [facility] password. You can change your password at any time. 7. Enter your Password Reset Question and Answer. This can be used at a later time if you forget your password. 8. Enter your e-mail address. You will receive e-mail notification when new information is available in [address] 9. Click Sign Up. You can now view and download portions of your medical record. 10. Click the Download Summary menu link to download a portable copy of your medical information. If you have questions, please visit the Frequently Asked Questions section of the [employer] website. Remember, [employer] is NOT to be used for urgent needs. For medical emergencies, dial 911. Now available from your iPhone and Android! Please provide this summary of care documentation to your next provider. Your primary care clinician is listed as Xiomara Anders. If you have any questions after today's visit, please call [phone].

## 2018-10-04 NOTE — PROGRESS NOTES
Psychiatric Outpatient Progress Note    Account Number:  [id number]  Name: Angel Powers    SUBJECTIVE:   CHIEF COMPLAINT:  Genevieve Cooper a 58 y.o. , White female and was seen today for her 2 month follow-up of psychiatric condition and psychotropic medication management.       HPI:    Edith reports the following psychiatric symptoms:  depression, anxiety and mood swings.  The symptoms have been present for years and are of moderate severity. The symptoms occur frequently.      Patient reported that she is doing no better. She never started Buspar as she was worried about her BP ? ? She stated that I told her that she should monitor her BP while on this medication, which I do not recall. She continues to have morning anxiety and Xanax does not seem to help. Appeared anxious with limited social interaction. Deloris Olivas self-care is good.  She did not report any new stressors. Patient denies any psychotic or manic symptoms.  She reported that she is sleeping and eating well. She is happy about her continued weight loss. She goes to [employer] regularly. She continues to struggle with he family issues.      Patient has lost 2 more lbs since last visit. Her BMI is 28.  Her BP/HR is within normal limits.  She reported that her blood sugars running up and down.  She reported that she is compliant with all her medications.       Contributing factors include: poor compliance with recommendations.      Patient denies SI/HI/SIB.     Side Effects:  none        Fam/Soc Hx (from [facility] Eval with updates):  Pt was brought up by both parents, there was no abuse reported, pt reports being a shy child, had no friends, felt lonely, youngest of 1 sister and 2 brothers, completed HS and some college, worked in [employer] for 16 yrs, took early [employer] to take care of her mom who passed away a yr ago. Pt is  for the last 39 yrs, has 2 grown children, lives with her , was  from her  in the past for 2 yrs.  Drinks alcohol occasionally, does report smoking marijuana once in the past. Mother with depression, daughter with depression.        REVIEW OF SYSTEMS:  Constitutional: positive for fatigue, weight loss  Eyes: positive for contacts/glasses  Ears, nose, mouth, throat, and face: negative for hearing loss and tinnitus  Respiratory: negative for cough or wheezing  Cardiovascular: negative for chest pain, fatigue  Gastrointestinal: negative for reflux symptoms and constipation  Genitourinary:negative for frequency and urinary incontinence  Musculoskeletal:negative for arthralgias  Neurological: positive for mild memory problems  Behavioral/Psych: positive for anxiety, mood swings and sleep disturbance, negative for SI or HI    Visit Vitals    /60    Pulse 76    Ht 5' 2\" (1.575 m)    Wt 70.3 kg (155 lb)    BMI 28.35 kg/m2       OBJECTIVE:                 Mental Status exam: WNL except for      Sensorium  oriented to time, place and person   Relations cooperative and passive    Eye Contact    appropriate   Appearance:  age appropriate, casually dressed and overweight   Motor Behavior/Gait:  within normal limits   Speech:  normal pitch and normal volume   Thought Process: goal directed and logical   Thought Content free of delusions and free of hallucinations   Suicidal ideations none   Homicidal ideations none   Mood:  euthymic   Affect:  anxious   Memory recent  adequate   Memory remote:  adequate   Concentration:  adequate   Abstraction:  concrete   Insight:  fair   Reliability fair   Judgment:  fair       MEDICAL DECISION MAKING  Data: pertinent labs, imaging, medical records and diagnostic tests reviewed and incorporated in diagnosis and treatment plan    Allergies   Allergen Reactions    Augmentin [Amoxicillin-Pot Clavulanate] Diarrhea and Nausea and Vomiting    Darvocet A500 [Propoxyphene N-Acetaminophen] Other (comments)     Heart flutters    Doxycycline Other (comments)     Stomach pain and diarrhea.  Has taken since without problems.  Gentamicin Swelling    Percocet [Oxycodone-Acetaminophen] Rash and Itching    Stelazine Swelling     Tongue swelling    Sulfa (Sulfonamide Antibiotics) Hives    Sumycin [Tetracycline] Other (comments)     Tongue tingling    Zegerid [Omeprazole-Sodium Bicarbonate] Unknown (comments)     Takes nexium without problems, but if she takes zegerid, she has nausea and sweating. Current Outpatient Prescriptions   Medication Sig Dispense Refill    irbesartan (AVAPRO) 300 mg tablet Take 300 mg by mouth nightly.  pioglitazone (ACTOS) 15 mg tablet Take 15 mg by mouth daily.  cetirizine (ZYRTEC) 10 mg tablet Take  by mouth.  amLODIPine (NORVASC) 5 mg tablet Take 5 mg by mouth daily.  lamoTRIgine (LAMICTAL) 25 mg tablet TAKE ONE TABLET BY MOUTH TWICE A DAY 60 Tab 2    ALPRAZolam (XANAX) 0.5 mg tablet Take 1 Tab by mouth two (2) times a day. Max Daily Amount: 1 mg. 60 Tab 2    lamoTRIgine (LAMICTAL) 100 mg tablet Take 1 Tab by mouth two (2) times a day. 60 Tab 3    glipiZIDE SR (GLUCOTROL XL) 5 mg CR tablet Take 10 mg by mouth daily.  ASPIRIN PO Take 81 mg by mouth daily.  esomeprazole (NEXIUM) 20 mg capsule Take 40 mg by mouth daily.  cholecalciferol (VITAMIN D3) 1,000 unit tablet Take 1,000 Units by mouth daily.  raloxifene (EVISTA) 60 mg tablet Take 60 mg by mouth daily.  fenofibrate 160 mg Tab Take 1 Tab by mouth nightly.  busPIRone (BUSPAR) 5 mg tablet Take 1 Tab by mouth two (2) times a day. 60 Tab 2    ibuprofen (MOTRIN) 600 mg tablet Take 1 Tab by mouth every six (6) hours as needed for Pain. 20 Tab 0    naloxone (NARCAN) 4 mg/actuation nasal spray Use 1 spray intranasally into 1 nostril. Use a new Narcan nasal spray for subsequent doses and administer into alternating nostrils. May repeat every 2 to 3 minutes as needed.   Indications: OPIATE-INDUCED RESPIRATORY DEPRESSION 2 Each 0    ONETOUCH ULTRA TEST strip       estradiol (VAGIFEM) 10 mcg tab vaginal tablet Insert 10 mcg into vagina two (2) days a week. Problems addressed today:    ICD-10-CM ICD-9-CM    1. Severe major depression without psychotic features ([facility] F32.2 296.23    2. Anxiety disorder, unspecified type F41.9 300.00    3. REM sleep behavior disorder G47.52 327.42        Assessment:   Parag Hurley  is a 58 y.o.  female  is not responding to treatment. Symptoms are unchanged. Patient denies SI/HI/SIB. No evidence of AH/VH or delusions. Risk Scoring- chronic illnesses and prescription drug management    Treatment Plan:  1. Medications:          Medication Changes/Adjustments: Start Buspar low dose, continue Xanax and Lamictal in the current dosages. Current Outpatient Prescriptions   Medication Sig Dispense Refill    irbesartan (AVAPRO) 300 mg tablet Take 300 mg by mouth nightly.  pioglitazone (ACTOS) 15 mg tablet Take 15 mg by mouth daily.  cetirizine (ZYRTEC) 10 mg tablet Take  by mouth.  amLODIPine (NORVASC) 5 mg tablet Take 5 mg by mouth daily.  lamoTRIgine (LAMICTAL) 25 mg tablet TAKE ONE TABLET BY MOUTH TWICE A DAY 60 Tab 2    ALPRAZolam (XANAX) 0.5 mg tablet Take 1 Tab by mouth two (2) times a day. Max Daily Amount: 1 mg. 60 Tab 2    lamoTRIgine (LAMICTAL) 100 mg tablet Take 1 Tab by mouth two (2) times a day. 60 Tab 3    glipiZIDE SR (GLUCOTROL XL) 5 mg CR tablet Take 10 mg by mouth daily.  ASPIRIN PO Take 81 mg by mouth daily.  esomeprazole (NEXIUM) 20 mg capsule Take 40 mg by mouth daily.  cholecalciferol (VITAMIN D3) 1,000 unit tablet Take 1,000 Units by mouth daily.  raloxifene (EVISTA) 60 mg tablet Take 60 mg by mouth daily.  fenofibrate 160 mg Tab Take 1 Tab by mouth nightly.  busPIRone (BUSPAR) 5 mg tablet Take 1 Tab by mouth two (2) times a day. 60 Tab 2    ibuprofen (MOTRIN) 600 mg tablet Take 1 Tab by mouth every six (6) hours as needed for Pain.  20 Tab 0    naloxone [facility]) 4 mg/actuation nasal spray Use 1 spray intranasally into 1 nostril. Use a new Narcan nasal spray for subsequent doses and administer into alternating nostrils. May repeat every 2 to 3 minutes as needed. Indications: OPIATE-INDUCED RESPIRATORY DEPRESSION 2 Each 0    ONETOUCH ULTRA TEST strip       estradiol (VAGIFEM) 10 mcg tab vaginal tablet Insert 10 mcg into vagina two (2) days a week. The following regarding medications was addressed:    (The risks and benefits of the proposed medication; the potential medication side effects ie    dry mouth, weight gain, GI upset, headache; patient given opportunity to ask questions)       2. Counseling and coordination of care including instructions for treatment, risks/benefits, risk factor reduction and patient/family education. She agrees with the plan. Patient instructed to call with any side effects, questions or issues. 3.  Pt was provided supportive counseling for her multiple family stressors and refractory depression/anxiety. PSYCHOTHERAPY:  approx 20 minutes  Type:  Supportive/Cognitive Behavioral/Solution Focused psychotherapy provided  Focus:     Current problems:              Non compliance   Medical issues    Psychoeducation provided: psych medications. Treatment plan reviewed with patient-including diagnosis and medications    Tamsen  is marginally stable. Follow-up Disposition:  Return in about 2 months (around 12/4/2018).       Qian Morton MD  10/4/2018

## 2018-12-06 ENCOUNTER — OFFICE VISIT (OUTPATIENT)
Dept: BEHAVIORAL/MENTAL HEALTH CLINIC | Age: 63
End: 2018-12-06

## 2018-12-06 VITALS
BODY MASS INDEX: 28.89 KG/M2 | HEIGHT: 62 IN | DIASTOLIC BLOOD PRESSURE: 69 MMHG | WEIGHT: 157 LBS | HEART RATE: 80 BPM | SYSTOLIC BLOOD PRESSURE: 138 MMHG

## 2018-12-06 DIAGNOSIS — G47.00 INSOMNIA, UNSPECIFIED TYPE: ICD-10-CM

## 2018-12-06 DIAGNOSIS — F41.9 ANXIETY DISORDER, UNSPECIFIED TYPE: ICD-10-CM

## 2018-12-06 DIAGNOSIS — F32.2 SEVERE MAJOR DEPRESSION WITHOUT PSYCHOTIC FEATURES (HCC): Primary | ICD-10-CM

## 2018-12-06 DIAGNOSIS — F39 MOOD DISORDER (HCC): ICD-10-CM

## 2018-12-06 RX ORDER — LAMOTRIGINE 100 MG/1
100 TABLET ORAL 2 TIMES DAILY
Qty: 60 TAB | Refills: 3 | Status: SHIPPED | OUTPATIENT
Start: 2018-12-06 | End: 2022-05-13

## 2018-12-06 RX ORDER — ALPRAZOLAM 0.5 MG/1
0.5 TABLET ORAL
Qty: 30 TAB | Refills: 2 | Status: SHIPPED | OUTPATIENT
Start: 2018-12-06 | End: 2018-12-27 | Stop reason: SDUPTHER

## 2018-12-06 RX ORDER — BUSPIRONE HYDROCHLORIDE 10 MG/1
10 TABLET ORAL 2 TIMES DAILY
Qty: 60 TAB | Refills: 2 | Status: SHIPPED | OUTPATIENT
Start: 2018-12-06

## 2018-12-06 RX ORDER — BUSPIRONE HYDROCHLORIDE 10 MG/1
5 TABLET ORAL 2 TIMES DAILY
Qty: 60 TAB | Refills: 2 | Status: SHIPPED | OUTPATIENT
Start: 2018-12-06 | End: 2018-12-06 | Stop reason: SDUPTHER

## 2018-12-06 RX ORDER — LAMOTRIGINE 25 MG/1
TABLET ORAL
Qty: 60 TAB | Refills: 2 | Status: SHIPPED | OUTPATIENT
Start: 2018-12-06 | End: 2022-05-13

## 2018-12-06 NOTE — PROGRESS NOTES
Psychiatric Outpatient Progress Note    Account Number:  [id number]  Name: Magali Guadarrama    SUBJECTIVE:   CHIEF COMPLAINT:  Cristhian Gallardo a 58 y.o. , White female and was seen today for her 2 month follow-up of psychiatric condition and psychotropic medication management.       HPI:    Edith reports the following psychiatric symptoms:  depression, anxiety and mood swings.  The symptoms have been present for years and are of moderate severity. The symptoms occur frequently.      Patient reported that she is doing a little bit better. She reports that she is more snappy and gets angry easily. Her anger is mostly towards her  who does not listen to her and forgets things easily. She is also very verbal and yells at people around her. She also complained of getting easily and worries about becoming demented as she has a strong history of dementia and her family on both sides. She is tolerating BuSpar and was wondering if she needs to take Xanax anymore. Patient reported that she has been in psychotherapy before but it has not helped her much. She talked to her best friend on a regular basis and that is very helpful. Her self-care is good. She did not report any new stressors. Patient denies any psychotic or manic symptoms.  She reported that she is sleeping and eating well. She goes to [employer] regularly. She continues to struggle with he family issues.      Patient has gained 2 more lbs since last visit. Her BMI is 34.  Her BP/HR is within normal limits.  She reported that her blood sugars running up and down.  She reported that she is compliant with all her medications.       Contributing factors include: poor compliance with recommendations.      Patient denies SI/HI/SIB.        Side Effects:  none        Fam/Soc Hx (from [facility] Eval with updates):  Pt was brought up by both parents, there was no abuse reported, pt reports being a shy child, had no friends, felt lonely, youngest of 1 sister and 2 brothers, completed HS and some college, worked in [employer] for 16 yrs, took early [employer] to take care of her mom who passed away a yr ago. Pt is  for the last 39 yrs, has 2 grown children, lives with her , was  from her  in the past for 2 yrs.  Drinks alcohol occasionally, does report smoking marijuana once in the past. Mother with depression, daughter with depression.        REVIEW OF SYSTEMS:  Constitutional: positive for fatigue, weight loss  Eyes: positive for contacts/glasses  Ears, nose, mouth, throat, and face: negative for hearing loss and tinnitus  Respiratory: negative for cough or wheezing  Cardiovascular: negative for chest pain, fatigue  Gastrointestinal: negative for reflux symptoms and constipation  Genitourinary:negative for frequency and urinary incontinence  Musculoskeletal:negative for arthralgias  Neurological: positive for mild memory problems  Behavioral/Psych: positive for anxiety, mood swings and sleep disturbance, negative for SI or HI    Visit Vitals  /69   Pulse 80   Ht 5' 2\" (1.575 m)   Wt 71.2 kg (157 lb)   BMI 28.72 kg/m²       OBJECTIVE:                 Mental Status exam: WNL except for      Sensorium  oriented to time, place and person   Relations cooperative and passive    Eye Contact    appropriate   Appearance:  age appropriate and casually dressed   Motor Behavior/Gait:  within normal limits   Speech:  normal pitch and normal volume   Thought Process: goal directed and logical   Thought Content free of delusions and free of hallucinations   Suicidal ideations none   Homicidal ideations none   Mood:  euthymic   Affect:  anxious   Memory recent  adequate   Memory remote:  adequate   Concentration:  adequate   Abstraction:  concrete   Insight:  fair   Reliability fair   Judgment:  fair       MEDICAL DECISION MAKING  Data: pertinent labs, imaging, medical records and diagnostic tests reviewed and incorporated in diagnosis and treatment plan    Allergies   Allergen Reactions    Augmentin [Amoxicillin-Pot Clavulanate] Diarrhea and Nausea and Vomiting    Darvocet A500 [Propoxyphene N-Acetaminophen] Other (comments)     Heart flutters    Doxycycline Other (comments)     Stomach pain and diarrhea. Has taken since without problems.  Gentamicin Swelling    Percocet [Oxycodone-Acetaminophen] Rash and Itching    Stelazine Swelling     Tongue swelling    Sulfa (Sulfonamide Antibiotics) Hives    Sumycin [Tetracycline] Other (comments)     Tongue tingling    Zegerid [Omeprazole-Sodium Bicarbonate] Unknown (comments)     Takes nexium without problems, but if she takes zegerid, she has nausea and sweating. Current Outpatient Medications   Medication Sig Dispense Refill    ALPRAZolam (XANAX) 0.5 mg tablet Take 1 Tab by mouth daily as needed for Anxiety. 30 Tab 2    busPIRone (BUSPAR) 10 mg tablet Take 0.5 Tabs by mouth two (2) times a day. 60 Tab 2    lamoTRIgine (LAMICTAL) 25 mg tablet TAKE ONE TABLET BY MOUTH TWICE A DAY 60 Tab 2    lamoTRIgine (LAMICTAL) 100 mg tablet Take 1 Tab by mouth two (2) times a day. 60 Tab 3    lamoTRIgine (LAMICTAL) 100 mg tablet Take 1 Tab by mouth two (2) times a day. 60 Tab 3    irbesartan (AVAPRO) 300 mg tablet Take 300 mg by mouth nightly.  pioglitazone (ACTOS) 15 mg tablet Take 15 mg by mouth daily.  cetirizine (ZYRTEC) 10 mg tablet Take  by mouth.  amLODIPine (NORVASC) 5 mg tablet Take 5 mg by mouth daily.  ONETOUCH ULTRA TEST strip       glipiZIDE SR (GLUCOTROL XL) 5 mg CR tablet Take 10 mg by mouth daily.  ASPIRIN PO Take 81 mg by mouth daily.  esomeprazole (NEXIUM) 20 mg capsule Take 40 mg by mouth daily.  cholecalciferol (VITAMIN D3) 1,000 unit tablet Take 1,000 Units by mouth daily.  estradiol (VAGIFEM) 10 mcg tab vaginal tablet Insert 10 mcg into vagina two (2) days a week.  raloxifene (EVISTA) 60 mg tablet Take 60 mg by mouth daily.       fenofibrate 160 mg Tab Take 1 Tab by mouth nightly. Problems addressed today:    ICD-10-CM ICD-9-CM    1. Severe major depression without psychotic features ([facility]) F32.2 296.23 ALPRAZolam (XANAX) 0.5 mg tablet   2. Anxiety disorder, unspecified type F41.9 300.00 ALPRAZolam (XANAX) 0.5 mg tablet   3. Insomnia, unspecified type G47.00 780.52    4. Mood disorder ([facility]CC) F39 296.90 lamoTRIgine (LAMICTAL) 25 mg tablet      lamoTRIgine (LAMICTAL) 100 mg tablet      lamoTRIgine (LAMICTAL) 100 mg tablet       Assessment:   [place]  is a 58 y.o.  female  is responding to treatment. Symptoms are improving. Patient denies SI/HI/SIB. No evidence of AH/VH or delusions. Risk Scoring- chronic illnesses and prescription drug management    Treatment Plan:  1. Medications:          Medication Changes/Adjustments: Change Xanax to 0.5 mg, 1 p.o. daily on as-needed basis only. Increase BuSpar to 10 mg twice a day                                                               Continue Lamictal in the current dosages. Current Outpatient Medications   Medication Sig Dispense Refill    ALPRAZolam (XANAX) 0.5 mg tablet Take 1 Tab by mouth daily as needed for Anxiety. 30 Tab 2    busPIRone (BUSPAR) 10 mg tablet Take 0.5 Tabs by mouth two (2) times a day. 60 Tab 2    lamoTRIgine (LAMICTAL) 25 mg tablet TAKE ONE TABLET BY MOUTH TWICE A DAY 60 Tab 2    lamoTRIgine (LAMICTAL) 100 mg tablet Take 1 Tab by mouth two (2) times a day. 60 Tab 3    lamoTRIgine (LAMICTAL) 100 mg tablet Take 1 Tab by mouth two (2) times a day. 60 Tab 3    irbesartan (AVAPRO) 300 mg tablet Take 300 mg by mouth nightly.  pioglitazone (ACTOS) 15 mg tablet Take 15 mg by mouth daily.  cetirizine (ZYRTEC) 10 mg tablet Take  by mouth.  amLODIPine (NORVASC) 5 mg tablet Take 5 mg by mouth daily.       ONETOUCH ULTRA TEST strip       glipiZIDE SR (GLUCOTROL XL) 5 mg CR tablet Take 10 mg by mouth daily.  ASPIRIN PO Take 81 mg by mouth daily.  esomeprazole (NEXIUM) 20 mg capsule Take 40 mg by mouth daily.  cholecalciferol (VITAMIN D3) 1,000 unit tablet Take 1,000 Units by mouth daily.  estradiol (VAGIFEM) 10 mcg tab vaginal tablet Insert 10 mcg into vagina two (2) days a week.  raloxifene (EVISTA) 60 mg tablet Take 60 mg by mouth daily.  fenofibrate 160 mg Tab Take 1 Tab by mouth nightly. The following regarding medications was addressed:    (The risks and benefits of the proposed medication; the potential medication side effects ie    dry mouth, weight gain, GI upset, headache; patient given opportunity to ask questions)       2. Counseling and coordination of care including instructions for treatment, risks/benefits, risk factor reduction and patient/family education. She agrees with the plan. Patient instructed to call with any side effects, questions or issues. 3.  Patient was provided supportive counseling for her episodes of getting angry and upset easily. She was recommended to start individual counseling with Lenora Galvan, to learn improved coping skills and behavior modifications. She was educated on personality disorders and how things can improve by working with a good therapist.  Patient agreed. PSYCHOTHERAPY:  approx 20 minutes  Type:  Supportive/Solution Focused psychotherapy provided  Focus:     Current problems:   Medical issues   Interpersonal conflicts    Psychoeducation provided: psych medications    Treatment plan reviewed with patient-including diagnosis and medications    Rock Moya is progressing. Follow-up Disposition:  Return in about 3 months (around 3/6/2019).       Tani Morales MD  12/6/2018

## 2018-12-07 DIAGNOSIS — F32.2 SEVERE MAJOR DEPRESSION WITHOUT PSYCHOTIC FEATURES (HCC): ICD-10-CM

## 2018-12-07 DIAGNOSIS — F41.9 ANXIETY DISORDER, UNSPECIFIED TYPE: ICD-10-CM

## 2018-12-07 RX ORDER — ALPRAZOLAM 0.5 MG/1
TABLET ORAL
Qty: 60 TAB | Refills: 0 | OUTPATIENT
Start: 2018-12-07

## 2018-12-27 ENCOUNTER — TELEPHONE (OUTPATIENT)
Dept: BEHAVIORAL/MENTAL HEALTH CLINIC | Age: 63
End: 2018-12-27

## 2018-12-27 DIAGNOSIS — F41.9 ANXIETY DISORDER, UNSPECIFIED TYPE: ICD-10-CM

## 2018-12-27 DIAGNOSIS — F32.2 SEVERE MAJOR DEPRESSION WITHOUT PSYCHOTIC FEATURES (HCC): ICD-10-CM

## 2018-12-27 RX ORDER — ALPRAZOLAM 0.5 MG/1
0.5 TABLET ORAL
Qty: 30 TAB | Refills: 0 | Status: SHIPPED | OUTPATIENT
Start: 2018-12-27

## 2018-12-27 NOTE — TELEPHONE ENCOUNTER
Pt called in regards to alprazolam denial. She said she has been out of it for a week    Cannot take 1 a day yet, per she is used to 2 a day. Said she has been taking 2 a day since she got it filled this month, didn't read directions that its 1 a day. She said \"luckily for me, I had some leftover alprazolam from my previous psych doctor, so I've taken those. \"    She said theres no way she can not take any (per her being out) because she is having panic attacks and getting sick in the stomach. .. Going through withdrawals. Said she's been taking it for 30 years and doesn't think she can get off of it. . Says she addicted to it. . Feels ashamed that that's the case. Doesn't know what she's Oren Garner do because everytime a dr has tried to wean her off, this happens. She said she doesn't know if the buspirone is helping because she's still feeling bad when tried to wean off the alprazolam.    She also said she \"didnt know she was supposed to take 1 a day because she was never told this\" - and that she was only told that she would be taking them as needed. Pt asking for a call back- she is home all day.       [phone] home    [phone] cell

## 2022-03-18 PROBLEM — F41.9 ANXIETY DISORDER: Status: ACTIVE | Noted: 2017-09-14

## 2022-03-19 PROBLEM — F32.2 SEVERE MAJOR DEPRESSION WITHOUT PSYCHOTIC FEATURES (HCC): Status: ACTIVE | Noted: 2017-09-14

## 2022-05-13 ENCOUNTER — HOSPITAL ENCOUNTER (INPATIENT)
Age: 67
LOS: 1 days | Discharge: HOME | DRG: 871 | End: 2022-05-17
Attending: EMERGENCY MEDICINE | Admitting: STUDENT IN AN ORGANIZED HEALTH CARE EDUCATION/TRAINING PROGRAM
Payer: MEDICARE

## 2022-05-13 ENCOUNTER — APPOINTMENT (OUTPATIENT)
Dept: CT IMAGING | Age: 67
DRG: 871 | End: 2022-05-13
Attending: EMERGENCY MEDICINE
Payer: MEDICARE

## 2022-05-13 ENCOUNTER — APPOINTMENT (OUTPATIENT)
Dept: GENERAL RADIOLOGY | Age: 67
DRG: 871 | End: 2022-05-13
Attending: EMERGENCY MEDICINE
Payer: MEDICARE

## 2022-05-13 DIAGNOSIS — J18.9 COMMUNITY ACQUIRED PNEUMONIA, UNSPECIFIED LATERALITY: ICD-10-CM

## 2022-05-13 DIAGNOSIS — A41.9 SEPSIS, DUE TO UNSPECIFIED ORGANISM, UNSPECIFIED WHETHER ACUTE ORGAN DYSFUNCTION PRESENT (HCC): Primary | ICD-10-CM

## 2022-05-13 DIAGNOSIS — I65.23 BILATERAL CAROTID ARTERY STENOSIS: ICD-10-CM

## 2022-05-13 DIAGNOSIS — I67.89 CEREBRAL MICROVASCULAR DISEASE: ICD-10-CM

## 2022-05-13 PROBLEM — R00.0 TACHYCARDIA: Status: ACTIVE | Noted: 2022-05-13

## 2022-05-13 LAB
ALBUMIN SERPL-MCNC: 3.5 G/DL (ref 3.5–5)
ALBUMIN/GLOB SERPL: 0.9 {RATIO} (ref 1.1–2.2)
ALP SERPL-CCNC: 44 U/L (ref 45–117)
ALT SERPL-CCNC: 22 U/L (ref 12–78)
ANION GAP SERPL CALC-SCNC: 6 MMOL/L (ref 5–15)
APPEARANCE UR: CLEAR
AST SERPL-CCNC: 14 U/L (ref 15–37)
ATRIAL RATE: 82 BPM
BACTERIA URNS QL MICRO: NEGATIVE /HPF
BASOPHILS # BLD: 0.1 K/UL (ref 0–0.1)
BASOPHILS NFR BLD: 1 % (ref 0–1)
BILIRUB SERPL-MCNC: 0.8 MG/DL (ref 0.2–1)
BILIRUB UR QL: NEGATIVE
BNP SERPL-MCNC: 274 PG/ML
BUN SERPL-MCNC: 21 MG/DL (ref 6–20)
BUN/CREAT SERPL: 23 (ref 12–20)
CALCIUM SERPL-MCNC: 9.5 MG/DL (ref 8.5–10.1)
CALCULATED P AXIS, ECG09: 14 DEGREES
CALCULATED R AXIS, ECG10: 50 DEGREES
CALCULATED T AXIS, ECG11: 74 DEGREES
CHLORIDE SERPL-SCNC: 107 MMOL/L (ref 97–108)
CO2 SERPL-SCNC: 25 MMOL/L (ref 21–32)
COLOR UR: NORMAL
COVID-19 RAPID TEST, COVR: NOT DETECTED
CREAT SERPL-MCNC: 0.91 MG/DL (ref 0.55–1.02)
D DIMER PPP FEU-MCNC: 0.82 MG/L FEU (ref 0–0.65)
DIAGNOSIS, 93000: NORMAL
DIFFERENTIAL METHOD BLD: ABNORMAL
EOSINOPHIL # BLD: 0.1 K/UL (ref 0–0.4)
EOSINOPHIL NFR BLD: 1 % (ref 0–7)
EPITH CASTS URNS QL MICRO: NORMAL /LPF
ERYTHROCYTE [DISTWIDTH] IN BLOOD BY AUTOMATED COUNT: 12.3 % (ref 11.5–14.5)
FLUAV AG NPH QL IA: NEGATIVE
FLUBV AG NOSE QL IA: NEGATIVE
GLOBULIN SER CALC-MCNC: 3.9 G/DL (ref 2–4)
GLUCOSE SERPL-MCNC: 154 MG/DL (ref 65–100)
GLUCOSE UR STRIP.AUTO-MCNC: NEGATIVE MG/DL
HCT VFR BLD AUTO: 33.5 % (ref 35–47)
HGB BLD-MCNC: 11.2 G/DL (ref 11.5–16)
HGB UR QL STRIP: NEGATIVE
IMM GRANULOCYTES # BLD AUTO: 0.1 K/UL (ref 0–0.04)
IMM GRANULOCYTES NFR BLD AUTO: 1 % (ref 0–0.5)
KETONES UR QL STRIP.AUTO: NEGATIVE MG/DL
LACTATE BLD-SCNC: 0.66 MMOL/L (ref 0.4–2)
LEUKOCYTE ESTERASE UR QL STRIP.AUTO: NEGATIVE
LIPASE SERPL-CCNC: 94 U/L (ref 73–393)
LYMPHOCYTES # BLD: 1.1 K/UL (ref 0.8–3.5)
LYMPHOCYTES NFR BLD: 10 % (ref 12–49)
MCH RBC QN AUTO: 30 PG (ref 26–34)
MCHC RBC AUTO-ENTMCNC: 33.4 G/DL (ref 30–36.5)
MCV RBC AUTO: 89.8 FL (ref 80–99)
MONOCYTES # BLD: 0.8 K/UL (ref 0–1)
MONOCYTES NFR BLD: 7 % (ref 5–13)
NEUTS SEG # BLD: 8.8 K/UL (ref 1.8–8)
NEUTS SEG NFR BLD: 80 % (ref 32–75)
NITRITE UR QL STRIP.AUTO: NEGATIVE
NRBC # BLD: 0 K/UL (ref 0–0.01)
NRBC BLD-RTO: 0 PER 100 WBC
P-R INTERVAL, ECG05: 178 MS
PH UR STRIP: 5 [PH] (ref 5–8)
PLATELET # BLD AUTO: 248 K/UL (ref 150–400)
PMV BLD AUTO: 10.1 FL (ref 8.9–12.9)
POTASSIUM SERPL-SCNC: 3.8 MMOL/L (ref 3.5–5.1)
PROCALCITONIN SERPL-MCNC: 0.08 NG/ML
PROT SERPL-MCNC: 7.4 G/DL (ref 6.4–8.2)
PROT UR STRIP-MCNC: NEGATIVE MG/DL
Q-T INTERVAL, ECG07: 376 MS
QRS DURATION, ECG06: 88 MS
QTC CALCULATION (BEZET), ECG08: 439 MS
RBC # BLD AUTO: 3.73 M/UL (ref 3.8–5.2)
RBC #/AREA URNS HPF: NORMAL /HPF (ref 0–5)
SODIUM SERPL-SCNC: 138 MMOL/L (ref 136–145)
SOURCE, COVRS: NORMAL
SP GR UR REFRACTOMETRY: 1.01 (ref 1–1.03)
TROPONIN-HIGH SENSITIVITY: 16 NG/L (ref 0–51)
TROPONIN-HIGH SENSITIVITY: 5 NG/L (ref 0–51)
UA: UC IF INDICATED,UAUC: NORMAL
UROBILINOGEN UR QL STRIP.AUTO: 0.2 EU/DL (ref 0.2–1)
VENTRICULAR RATE, ECG03: 82 BPM
WBC # BLD AUTO: 10.9 K/UL (ref 3.6–11)
WBC URNS QL MICRO: NORMAL /HPF (ref 0–4)

## 2022-05-13 PROCEDURE — G0378 HOSPITAL OBSERVATION PER HR: HCPCS

## 2022-05-13 PROCEDURE — 96375 TX/PRO/DX INJ NEW DRUG ADDON: CPT

## 2022-05-13 PROCEDURE — U0005 INFEC AGEN DETEC AMPLI PROBE: HCPCS

## 2022-05-13 PROCEDURE — 74011250637 HC RX REV CODE- 250/637: Performed by: HOSPITALIST

## 2022-05-13 PROCEDURE — 74011000636 HC RX REV CODE- 636: Performed by: EMERGENCY MEDICINE

## 2022-05-13 PROCEDURE — 87804 INFLUENZA ASSAY W/OPTIC: CPT

## 2022-05-13 PROCEDURE — 99285 EMERGENCY DEPT VISIT HI MDM: CPT

## 2022-05-13 PROCEDURE — 83690 ASSAY OF LIPASE: CPT

## 2022-05-13 PROCEDURE — 83605 ASSAY OF LACTIC ACID: CPT

## 2022-05-13 PROCEDURE — 71046 X-RAY EXAM CHEST 2 VIEWS: CPT

## 2022-05-13 PROCEDURE — 87635 SARS-COV-2 COVID-19 AMP PRB: CPT

## 2022-05-13 PROCEDURE — 96367 TX/PROPH/DG ADDL SEQ IV INF: CPT

## 2022-05-13 PROCEDURE — 96361 HYDRATE IV INFUSION ADD-ON: CPT

## 2022-05-13 PROCEDURE — 84145 PROCALCITONIN (PCT): CPT

## 2022-05-13 PROCEDURE — 74011250636 HC RX REV CODE- 250/636: Performed by: EMERGENCY MEDICINE

## 2022-05-13 PROCEDURE — 84484 ASSAY OF TROPONIN QUANT: CPT

## 2022-05-13 PROCEDURE — 36415 COLL VENOUS BLD VENIPUNCTURE: CPT

## 2022-05-13 PROCEDURE — 71275 CT ANGIOGRAPHY CHEST: CPT

## 2022-05-13 PROCEDURE — 93005 ELECTROCARDIOGRAM TRACING: CPT

## 2022-05-13 PROCEDURE — 74011000250 HC RX REV CODE- 250: Performed by: HOSPITALIST

## 2022-05-13 PROCEDURE — 87040 BLOOD CULTURE FOR BACTERIA: CPT

## 2022-05-13 PROCEDURE — 74011250636 HC RX REV CODE- 250/636: Performed by: HOSPITALIST

## 2022-05-13 PROCEDURE — 74011000258 HC RX REV CODE- 258: Performed by: EMERGENCY MEDICINE

## 2022-05-13 PROCEDURE — 81001 URINALYSIS AUTO W/SCOPE: CPT

## 2022-05-13 PROCEDURE — 83880 ASSAY OF NATRIURETIC PEPTIDE: CPT

## 2022-05-13 PROCEDURE — 85025 COMPLETE CBC W/AUTO DIFF WBC: CPT

## 2022-05-13 PROCEDURE — 96365 THER/PROPH/DIAG IV INF INIT: CPT

## 2022-05-13 PROCEDURE — 85379 FIBRIN DEGRADATION QUANT: CPT

## 2022-05-13 PROCEDURE — 96368 THER/DIAG CONCURRENT INF: CPT

## 2022-05-13 PROCEDURE — 80053 COMPREHEN METABOLIC PANEL: CPT

## 2022-05-13 RX ORDER — ACETAMINOPHEN 325 MG/1
650 TABLET ORAL
Status: DISCONTINUED | OUTPATIENT
Start: 2022-05-13 | End: 2022-05-13 | Stop reason: SDUPTHER

## 2022-05-13 RX ORDER — LORAZEPAM 2 MG/ML
0.5 INJECTION INTRAMUSCULAR
Status: COMPLETED | OUTPATIENT
Start: 2022-05-13 | End: 2022-05-13

## 2022-05-13 RX ORDER — KETOROLAC TROMETHAMINE 30 MG/ML
15 INJECTION, SOLUTION INTRAMUSCULAR; INTRAVENOUS
Status: COMPLETED | OUTPATIENT
Start: 2022-05-13 | End: 2022-05-13

## 2022-05-13 RX ORDER — TRAMADOL HYDROCHLORIDE 50 MG/1
50 TABLET ORAL
Status: DISCONTINUED | OUTPATIENT
Start: 2022-05-13 | End: 2022-05-17 | Stop reason: HOSPADM

## 2022-05-13 RX ORDER — PANTOPRAZOLE SODIUM 40 MG/1
40 TABLET, DELAYED RELEASE ORAL
Status: DISCONTINUED | OUTPATIENT
Start: 2022-05-14 | End: 2022-05-17 | Stop reason: HOSPADM

## 2022-05-13 RX ORDER — IBUPROFEN 600 MG/1
600 TABLET ORAL
Status: DISCONTINUED | OUTPATIENT
Start: 2022-05-13 | End: 2022-05-17 | Stop reason: HOSPADM

## 2022-05-13 RX ORDER — MORPHINE SULFATE 2 MG/ML
4 INJECTION, SOLUTION INTRAMUSCULAR; INTRAVENOUS
Status: COMPLETED | OUTPATIENT
Start: 2022-05-13 | End: 2022-05-13

## 2022-05-13 RX ORDER — BUSPIRONE HYDROCHLORIDE 7.5 MG/1
15 TABLET ORAL EVERY EVENING
Status: DISCONTINUED | OUTPATIENT
Start: 2022-05-13 | End: 2022-05-17 | Stop reason: HOSPADM

## 2022-05-13 RX ORDER — BUPROPION HYDROCHLORIDE 150 MG/1
150 TABLET ORAL DAILY
COMMUNITY

## 2022-05-13 RX ORDER — ACETAMINOPHEN 650 MG/1
650 SUPPOSITORY RECTAL
Status: DISCONTINUED | OUTPATIENT
Start: 2022-05-13 | End: 2022-05-17 | Stop reason: HOSPADM

## 2022-05-13 RX ORDER — ONDANSETRON 2 MG/ML
4 INJECTION INTRAMUSCULAR; INTRAVENOUS
Status: DISCONTINUED | OUTPATIENT
Start: 2022-05-13 | End: 2022-05-17 | Stop reason: HOSPADM

## 2022-05-13 RX ORDER — ASPIRIN 81 MG/1
81 TABLET ORAL DAILY
Status: DISCONTINUED | OUTPATIENT
Start: 2022-05-13 | End: 2022-05-17 | Stop reason: HOSPADM

## 2022-05-13 RX ORDER — ENOXAPARIN SODIUM 100 MG/ML
40 INJECTION SUBCUTANEOUS DAILY
Status: DISCONTINUED | OUTPATIENT
Start: 2022-05-14 | End: 2022-05-17 | Stop reason: HOSPADM

## 2022-05-13 RX ORDER — ALPRAZOLAM 0.5 MG/1
1 TABLET ORAL
Status: DISCONTINUED | OUTPATIENT
Start: 2022-05-13 | End: 2022-05-17 | Stop reason: HOSPADM

## 2022-05-13 RX ORDER — HYDROMORPHONE HYDROCHLORIDE 1 MG/ML
0.5 INJECTION, SOLUTION INTRAMUSCULAR; INTRAVENOUS; SUBCUTANEOUS
Status: COMPLETED | OUTPATIENT
Start: 2022-05-13 | End: 2022-05-13

## 2022-05-13 RX ORDER — SODIUM CHLORIDE 0.9 % (FLUSH) 0.9 %
5-40 SYRINGE (ML) INJECTION AS NEEDED
Status: DISCONTINUED | OUTPATIENT
Start: 2022-05-13 | End: 2022-05-17 | Stop reason: HOSPADM

## 2022-05-13 RX ORDER — AMLODIPINE BESYLATE 5 MG/1
10 TABLET ORAL DAILY
Status: DISCONTINUED | OUTPATIENT
Start: 2022-05-13 | End: 2022-05-17 | Stop reason: HOSPADM

## 2022-05-13 RX ORDER — SODIUM CHLORIDE 9 MG/ML
75 INJECTION, SOLUTION INTRAVENOUS CONTINUOUS
Status: DISCONTINUED | OUTPATIENT
Start: 2022-05-13 | End: 2022-05-15

## 2022-05-13 RX ORDER — POLYETHYLENE GLYCOL 3350 17 G/17G
17 POWDER, FOR SOLUTION ORAL DAILY PRN
Status: DISCONTINUED | OUTPATIENT
Start: 2022-05-13 | End: 2022-05-17 | Stop reason: HOSPADM

## 2022-05-13 RX ORDER — IRBESARTAN 150 MG/1
300 TABLET ORAL DAILY
Status: DISCONTINUED | OUTPATIENT
Start: 2022-05-13 | End: 2022-05-17 | Stop reason: HOSPADM

## 2022-05-13 RX ORDER — CETIRIZINE HCL 10 MG
10 TABLET ORAL DAILY
Status: DISCONTINUED | OUTPATIENT
Start: 2022-05-13 | End: 2022-05-17 | Stop reason: HOSPADM

## 2022-05-13 RX ORDER — SODIUM CHLORIDE 0.9 % (FLUSH) 0.9 %
5-40 SYRINGE (ML) INJECTION EVERY 8 HOURS
Status: DISCONTINUED | OUTPATIENT
Start: 2022-05-13 | End: 2022-05-17 | Stop reason: HOSPADM

## 2022-05-13 RX ORDER — FENOFIBRATE 145 MG/1
145 TABLET, COATED ORAL
Status: DISCONTINUED | OUTPATIENT
Start: 2022-05-13 | End: 2022-05-17 | Stop reason: HOSPADM

## 2022-05-13 RX ORDER — BUPROPION HYDROCHLORIDE 150 MG/1
150 TABLET ORAL DAILY
Status: DISCONTINUED | OUTPATIENT
Start: 2022-05-13 | End: 2022-05-17 | Stop reason: HOSPADM

## 2022-05-13 RX ORDER — ALENDRONATE SODIUM 70 MG/1
70 TABLET ORAL
COMMUNITY

## 2022-05-13 RX ORDER — ONDANSETRON 4 MG/1
4 TABLET, ORALLY DISINTEGRATING ORAL
Status: DISCONTINUED | OUTPATIENT
Start: 2022-05-13 | End: 2022-05-17 | Stop reason: HOSPADM

## 2022-05-13 RX ORDER — IRBESARTAN 150 MG/1
300 TABLET ORAL DAILY
Status: DISCONTINUED | OUTPATIENT
Start: 2022-05-13 | End: 2022-05-13 | Stop reason: SDUPTHER

## 2022-05-13 RX ADMIN — CEFTRIAXONE 1 G: 1 INJECTION, POWDER, FOR SOLUTION INTRAMUSCULAR; INTRAVENOUS at 11:36

## 2022-05-13 RX ADMIN — KETOROLAC TROMETHAMINE 15 MG: 30 INJECTION, SOLUTION INTRAMUSCULAR at 07:52

## 2022-05-13 RX ADMIN — IOPAMIDOL 80 ML: 755 INJECTION, SOLUTION INTRAVENOUS at 09:00

## 2022-05-13 RX ADMIN — HYDROMORPHONE HYDROCHLORIDE 0.5 MG: 1 INJECTION, SOLUTION INTRAMUSCULAR; INTRAVENOUS; SUBCUTANEOUS at 11:37

## 2022-05-13 RX ADMIN — BUSPIRONE HYDROCHLORIDE 15 MG: 7.5 TABLET ORAL at 17:52

## 2022-05-13 RX ADMIN — CETIRIZINE HYDROCHLORIDE 10 MG: 10 TABLET, FILM COATED ORAL at 17:52

## 2022-05-13 RX ADMIN — FENOFIBRATE 145 MG: 145 TABLET ORAL at 22:40

## 2022-05-13 RX ADMIN — AZITHROMYCIN MONOHYDRATE 500 MG: 500 INJECTION, POWDER, LYOPHILIZED, FOR SOLUTION INTRAVENOUS at 11:50

## 2022-05-13 RX ADMIN — BUPROPION HYDROCHLORIDE 150 MG: 150 TABLET, EXTENDED RELEASE ORAL at 17:52

## 2022-05-13 RX ADMIN — SODIUM CHLORIDE 125 ML/HR: 9 INJECTION, SOLUTION INTRAVENOUS at 17:53

## 2022-05-13 RX ADMIN — SODIUM CHLORIDE 500 ML: 9 INJECTION, SOLUTION INTRAVENOUS at 13:39

## 2022-05-13 RX ADMIN — ASPIRIN 81 MG: 81 TABLET, COATED ORAL at 17:52

## 2022-05-13 RX ADMIN — IBUPROFEN 600 MG: 600 TABLET ORAL at 17:51

## 2022-05-13 RX ADMIN — MORPHINE SULFATE 4 MG: 2 INJECTION, SOLUTION INTRAMUSCULAR; INTRAVENOUS at 09:47

## 2022-05-13 RX ADMIN — SODIUM CHLORIDE 500 ML: 9 INJECTION, SOLUTION INTRAVENOUS at 14:39

## 2022-05-13 RX ADMIN — LORAZEPAM 0.5 MG: 2 INJECTION INTRAMUSCULAR; INTRAVENOUS at 07:52

## 2022-05-13 RX ADMIN — ALPRAZOLAM 1 MG: 0.5 TABLET ORAL at 22:40

## 2022-05-13 RX ADMIN — AMLODIPINE BESYLATE 10 MG: 5 TABLET ORAL at 17:51

## 2022-05-13 NOTE — ED PROVIDER NOTES
EMERGENCY DEPARTMENT HISTORY AND PHYSICAL EXAM      Date: 5/13/2022  Patient Name: Judit Muñoz    History of Presenting Illness     Chief Complaint   Patient presents with    Chest Pain     spasms in chest up through throat onset wednesday at 3am; took advil  to help it does but it comes back. had chills on wednesday and thursday (took a Covid test negative at home); pain is sharp and hurts to breath. History Provided By: Patient    HPI: Judit Muñoz, 75 y.o. female with PMHx as noted below presents the emergency department with chief complaint of chest pain, mild dyspnea for the last 2 days. Patient describes it as a intermittent pleuritic type pain that is moderate in intensity. Pain is nonradiating. She describes it as a spasming pain. She does also report some associated chills. Pt denies any other alleviating or exacerbating factors.  Additionally, pt specifically denies any recent fever, chills, headache, nausea, vomiting, abdominal pain, CP, SOB, lightheadedness, dizziness, numbness, weakness, BLE swelling, heart palpitations, urinary sxs, diarrhea, constipation, melena, hematochezia,    PCP: Javi Odonnell MD    Current Facility-Administered Medications   Medication Dose Route Frequency Provider Last Rate Last Admin    bisacodyL (DULCOLAX) suppository 10 mg  10 mg Rectal DAILY PRN Morgan Cain, NP   10 mg at 05/16/22 0634    sodium chloride (NS) flush 5-40 mL  5-40 mL IntraVENous Q8H Chad Murguia MD   10 mL at 05/15/22 1754    sodium chloride (NS) flush 5-40 mL  5-40 mL IntraVENous PRN Chad Murguia MD        acetaminophen (TYLENOL) suppository 650 mg  650 mg Rectal Q6H PRN Chad Murguia MD        polyethylene glycol [facility]) packet 17 g  17 g Oral DAILY PRN Chad Murguia MD        ondansetron (ZOFRAN ODT) tablet 4 mg  4 mg Oral Q8H PRN Chad Murguia MD        Or    ondansetron [facility]) injection 4 mg  4 mg IntraVENous Q6H PRN Chad Murguia MD   4 mg at 05/14/22 1912    enoxaparin (LOVENOX) injection 40 mg  40 mg SubCUTAneous DAILY Edd Nowak MD   40 mg at 05/16/22 4089    ibuprofen (MOTRIN) tablet 600 mg  600 mg Oral Q6H PRN Edd Nowak MD   600 mg at 05/16/22 0440    traMADoL (ULTRAM) tablet 50 mg  50 mg Oral Q6H PRN Edd Nowak MD        cefTRIAXone (ROCEPHIN) 1 g in 0.9% sodium chloride (MBP/ADV) 50 mL MBP  1 g IntraVENous Q24H Edd Nowak  mL/hr at 05/15/22 1124 1 g at 05/15/22 1124    ALPRAZolam (XANAX) tablet 1 mg  1 mg Oral QHS Edd Nowak MD   1 mg at 05/15/22 2350    [Held by provider] amLODIPine (NORVASC) tablet 10 mg  10 mg Oral DAILY Edd Nowak MD   10 mg at 05/14/22 1028    aspirin delayed-release tablet 81 mg  81 mg Oral DAILY Edd Nowak MD   81 mg at 05/16/22 6484    buPROPion XL (WELLBUTRIN XL) tablet 150 mg  150 mg Oral DAILY Edd Nowak MD   150 mg at 05/16/22 2989    busPIRone (BUSPAR) tablet 15 mg  15 mg Oral QPM Edd Nowak MD   15 mg at 05/15/22 1752    cetirizine (ZYRTEC) tablet 10 mg  10 mg Oral DAILY Edd Nowak MD   10 mg at 05/16/22 6624    pantoprazole (PROTONIX) tablet 40 mg  40 mg Oral ACB Edd Nowak MD   40 mg at 05/16/22 0310    fenofibrate nanocrystallized (TRICOR) tablet 145 mg  145 mg Oral QHS Edd Nowak MD   145 mg at 05/15/22 2349    [Held by provider] irbesartan (AVAPRO) tablet 300 mg  300 mg Oral DAILY RoryabaSusan driscoll NP   300 mg at 05/14/22 1052       Past History     Past Medical History:  Past Medical History:   Diagnosis Date    Arthritis     Breast disorder 03/24/2011    fibrocystic breasts    Carotid stenosis, left     Chest pain, unspecified 11/10/2010    Chronic pain     abdomen/stomach/constipation    Essential hypertension, benign 11/10/2010    Family history of cardiovascular disease 11/10/2010    Fibromyalgia     Genital herpes     GERD (gastroesophageal reflux disease)     Hypertension     Mixed hyperlipidemia 11/10/2010    Nausea & vomiting     Nonspecific abnormal electrocardiogram (ECG) (EKG) 11/10/2010    Osteopenia     Palpitations 11/10/2010    Sleep apnea     does not sleep with machine but carries the dx        Past Surgical History:  Past Surgical History:   Procedure Laterality Date    HX CATARACT REMOVAL      bilateral    HX GI      EGD x3 - polyp removed/still has 2 polyps    HX GYN      ovarian cystectomy-left     HX HEENT      left chest cystectomy    HX HYSTERECTOMY      HX ORTHOPAEDIC      left arm cystectomy-  age 12   [de-identified] OTHER SURGICAL      \"entwined nerves\" removed from above right ankle    HX SALPINGO-OOPHORECTOMY      HX TONSILLECTOMY      HX TUBAL LIGATION      TN BREAST SURGERY PROCEDURE UNLISTED      breast bxs - benign       Family History:  Family History   Problem Relation Age of Onset    Post-op Nausea/Vomiting Mother     Heart Attack Mother     Cancer Paternal Aunt         breast    Stroke Father        Social History:  Social History     Tobacco Use    Smoking status: Former Smoker     Packs/day: 0.50    Smokeless tobacco: Never Used    Tobacco comment: quit 2 1/2 years ago - was an on and off smoker then   Substance Use Topics    Alcohol use: Yes     Comment: occas    Drug use: No       Allergies: Allergies   Allergen Reactions    Azithromycin Anaphylaxis     O2 and BP dropped rapidly.  Augmentin [Amoxicillin-Pot Clavulanate] Diarrhea and Nausea and Vomiting    Darvocet A500 [Propoxyphene N-Acetaminophen] Other (comments)     Heart flutters    Doxycycline Other (comments)     Stomach pain and diarrhea. Has taken since without problems.     Gentamicin Swelling    Percocet [Oxycodone-Acetaminophen] Rash and Itching    Stelazine Swelling     Tongue swelling    Sulfa (Sulfonamide Antibiotics) Hives    Sumycin [Tetracycline] Other (comments)     Tongue tingling    Zegerid [Omeprazole-Sodium Bicarbonate] Unknown (comments)     Takes nexium without problems, but if she takes zegerid, she has nausea and sweating. Review of Systems   Review of Systems  Constitutional: Negative for feve. Positive chills, and fatigue. HENT: Negative for congestion, sore throat, rhinorrhea, sneezing and neck stiffness   Eyes: Negative for discharge and redness. Respiratory: Positive for  shortness of breath negative wheezing   Cardiovascular: Positive for chest pain. Negative palpitations   Gastrointestinal: Negative for nausea, vomiting, abdominal pain, constipation, diarrhea and blood in stool. Genitourinary: Negative for dysuria, hematuria, flank pain, decreased urine volume, discharge,   Musculoskeletal: Negative for myalgias or joint pain . Skin: Negative for rash or lesions . Neurological: Negative weakness, light-headedness, numbness and headaches. Physical Exam   Physical Exam    GENERAL: alert and oriented, no acute distress  EYES: PEERL, No injection, discharge or icterus. ENT: Mucous membranes pink and moist.  NECK: Supple  LUNGS: Airway patent. labored respirations. Crackles noted in the bases bilaterally. HEART: Regular rate and rhythm. No peripheral edema  ABDOMEN: Non-distended and non-tender, without guarding or rebound.   SKIN:  warm, dry  MSK/EXTREMITIES: Without swelling, tenderness or deformity, symmetric with normal ROM  NEUROLOGICAL: Alert, oriented      Diagnostic Study Results     Labs -     Recent Results (from the past 12 hour(s))   METABOLIC PANEL, BASIC    Collection Time: 05/16/22 12:37 AM   Result Value Ref Range    Sodium 143 136 - 145 mmol/L    Potassium 3.3 (L) 3.5 - 5.1 mmol/L    Chloride 113 (H) 97 - 108 mmol/L    CO2 23 21 - 32 mmol/L    Anion gap 7 5 - 15 mmol/L    Glucose 125 (H) 65 - 100 mg/dL    BUN 17 6 - 20 MG/DL    Creatinine 0.78 0.55 - 1.02 MG/DL    BUN/Creatinine ratio 22 (H) 12 - 20      GFR est AA >60 >60 ml/min/1.73m2    GFR est non-AA >60 >60 ml/min/1.73m2    Calcium 8.8 8.5 - 10.1 MG/DL   NT-PRO BNP    Collection Time: 05/16/22 12:37 AM   Result Value Ref Range    NT pro-BNP 2,800 (H) <125 PG/ML   PROCALCITONIN    Collection Time: 05/16/22 12:37 AM   Result Value Ref Range    Procalcitonin <0.05 ng/mL   CBC WITH AUTOMATED DIFF    Collection Time: 05/16/22 12:37 AM   Result Value Ref Range    WBC 10.1 3.6 - 11.0 K/uL    RBC 3.18 (L) 3.80 - 5.20 M/uL    HGB 9.5 (L) 11.5 - 16.0 g/dL    HCT 29.6 (L) 35.0 - 47.0 %    MCV 93.1 80.0 - 99.0 FL    MCH 29.9 26.0 - 34.0 PG    MCHC 32.1 30.0 - 36.5 g/dL    RDW 12.5 11.5 - 14.5 %    PLATELET 671 624 - 501 K/uL    MPV 9.9 8.9 - 12.9 FL    NRBC 0.0 0  WBC    ABSOLUTE NRBC 0.00 0.00 - 0.01 K/uL    NEUTROPHILS 75 32 - 75 %    LYMPHOCYTES 15 12 - 49 %    MONOCYTES 6 5 - 13 %    EOSINOPHILS 2 0 - 7 %    BASOPHILS 1 0 - 1 %    IMMATURE GRANULOCYTES 1 (H) 0.0 - 0.5 %    ABS. NEUTROPHILS 7.7 1.8 - 8.0 K/UL    ABS. LYMPHOCYTES 1.5 0.8 - 3.5 K/UL    ABS. MONOCYTES 0.6 0.0 - 1.0 K/UL    ABS. EOSINOPHILS 0.2 0.0 - 0.4 K/UL    ABS. BASOPHILS 0.1 0.0 - 0.1 K/UL    ABS. IMM.  GRANS. 0.1 (H) 0.00 - 0.04 K/UL    DF AUTOMATED     ECHO ADULT COMPLETE    Collection Time: 05/16/22  7:45 AM   Result Value Ref Range    IVSd 1.1 (A) 0.6 - 0.9 cm    LVIDd 3.9 3.9 - 5.3 cm    LVIDs 2.8 cm    LVOT Diameter 1.8 cm    LVPWd 1.4 (A) 0.6 - 0.9 cm    EF BP 77 55 - 100 %    LV Ejection Fraction A2C 81 %    LV Ejection Fraction A4C 71 %    LV EDV A2C 94 mL    LV EDV A4C 70 mL    LV EDV BP 82 56 - 104 mL    LV ESV A2C 18 mL    LV ESV A4C 21 mL    LV ESV BP 19 19 - 49 mL    LVOT Peak Gradient 6 mmHg    LVOT Peak Gradient 6 mmHg    LVOT Mean Gradient 3 mmHg    LVOT SV 67.1 ml    LVOT Peak Velocity 1.2 m/s    LVOT Peak Velocity 1.2 m/s    LVOT VTI 26.4 cm    RVIDd 4.3 cm    RV Free Wall Peak S' 12 cm/s    RVOT Peak Gradient 4 mmHg    RVOT Peak Velocity 1.0 m/s    LA Diameter 4.4 cm    LA Volume A/L 65 mL    LA Volume 2C 58 (A) 22 - 52 mL    LA Volume 4C 70 (A) 22 - 52 mL    AV Cusp Mmode 1.4 cm    AV Area by Peak Velocity 1.4 cm2    AV Area by Peak Velocity 1.4 cm2    AV Area by Peak Velocity 1.4 cm2    AV Area by Peak Velocity 1.4 cm2    AV Area by VTI 1.7 cm2    AV Peak Gradient 18 mmHg    AV Peak Gradient 18 mmHg    AV Mean Gradient 10 mmHg    AV Peak Velocity 2.1 m/s    AV Peak Velocity 2.1 m/s    AV Mean Velocity 1.5 m/s    AV VTI 38.7 cm    MV A Velocity 0.72 m/s    MV E Wave Deceleration Time 160.2 ms    MV E Velocity 1.18 m/s    LV E' Lateral Velocity 8 cm/s    LV E' Septal Velocity 11 cm/s    MV Area by VTI 0.9 cm2    MR Peak Gradient 88 mmHg    MR Peak Velocity 4.7 m/s    MV Peak Gradient 48 mmHg    MV Mean Gradient 34 mmHg    MV Max Velocity 3.2 m/s    MV Mean Velocity 2.4 m/s    MV VTI 71.6 cm    PV Peak Gradient 7 mmHg    PV Max Velocity 1.3 m/s    TAPSE 1.7 1.7 cm    TR Peak Gradient 20 mmHg    TR Max Velocity 2.15 m/s    Aortic Root 3.0 cm    Fractional Shortening 2D 28 28 - 44 %    LV ESV Index BP 10 mL/m2    LV EDV Index BP 45 mL/m2    LV ESV Index A4C 12 mL/m2    LV EDV Index A4C 39 mL/m2    LV ESV Index A2C 10 mL/m2    LV EDV Index A2C 52 mL/m2    LVIDd Index 2.15 cm/m2    LVIDs Index 1.55 cm/m2    LV RWT Ratio 0.72     LV Mass 2D 169.4 (A) 67 - 162 g    LV Mass 2D Index 93.6 43 - 95 g/m2    MV E/A 1.64     E/E' Ratio (Averaged) 12.74     E/E' Lateral 14.75     E/E' Septal 10.73     LA Volume Index A/L 36 16 - 34 mL/m2    LVOT Stroke Volume Index 37.1 mL/m2    LVOT Area 2.5 cm2    LA Volume Index 2C 32 16 - 34 mL/m2    LA Volume Index 4C 39 (A) 16 - 34 mL/m2    LA Size Index 2.43 cm/m2    LA/AO Root Ratio 1.47     Ao Root Index 1.66 cm/m2    LVOT:AV VTI Index 0.68     LEONORA/BSA VTI 0.9 cm2/m2    MV:LVOT VTI Index 2.71        Radiologic Studies -   CTA CHEST W OR W WO CONT   Final Result   Trace right pleural effusion. Lingular and bilateral lower lobe   atelectasis. No evidence of pulmonary embolism. XR CHEST PA LAT   Final Result   Trace bilateral pleural effusions and left lower lobe infiltrate.            CT Results  (Last 48 hours)    None CXR Results  (Last 48 hours)    None            Medical Decision Making     ISonal MD am the first provider for this patient and am the attending of record for this patient encounter. I reviewed the vital signs, available nursing notes, past medical history, past surgical history, family history and social history. Vital Signs-Reviewed the patient's vital signs. EKG interpretation: (Preliminary)  Rhythm: normal sinus rhythm; and regular . Rate (approx.): 82; Axis: normal; P wave: normal; QRS interval: normal ; ST/T wave: Nonspecific T wave abnormalities;  was interpreted by Hamida Mccallum MD,ED Provider. Records Reviewed: Nursing Notes and Old Medical Records    Provider Notes (Medical Decision Making): On presentation, the patient is well appearing, noted to have mildly labored respirations. Differential includes COVID, pneumonia, pulmonary embolism, CHF, ACS, anemia, metabolic abnormality. Initial labs were overall reassuring and nondiagnostic. We did obtain a CT scan of the chest which was negative for PE but did show some signs of bilateral pneumonia. Patient was started on antibiotics at that time. Patient is having some increased work of breathing so we will plan to admit for observation and further management. ED Course:   Initial assessment performed. The patients presenting problems have been discussed, and they are in agreement with the care plan formulated and outlined with them. I have encouraged them to ask questions as they arise throughout their visit.         Medications   sodium chloride (NS) flush 5-40 mL (0 mL IntraVENous Held 5/16/22 0503)   sodium chloride (NS) flush 5-40 mL (has no administration in time range)   acetaminophen (TYLENOL) suppository 650 mg (has no administration in time range)   polyethylene glycol (MIRALAX) packet 17 g (has no administration in time range)   ondansetron (ZOFRAN ODT) tablet 4 mg ( Oral See Alternative 5/14/22 1912)     Or   ondansetron (ZOFRAN) injection 4 mg (4 mg IntraVENous Given 5/14/22 1912)   enoxaparin (LOVENOX) injection 40 mg (40 mg SubCUTAneous Given 5/16/22 0832)   ibuprofen (MOTRIN) tablet 600 mg (600 mg Oral Given 5/16/22 0440)   traMADoL (ULTRAM) tablet 50 mg (has no administration in time range)   cefTRIAXone (ROCEPHIN) 1 g in 0.9% sodium chloride (MBP/ADV) 50 mL MBP (1 g IntraVENous New Bag 5/15/22 1124)   ALPRAZolam (XANAX) tablet 1 mg (1 mg Oral Given 5/15/22 2350)   amLODIPine (NORVASC) tablet 10 mg ( Oral Automatically Held 5/27/22 0900)   aspirin delayed-release tablet 81 mg (81 mg Oral Given 5/16/22 0832)   buPROPion XL (WELLBUTRIN XL) tablet 150 mg (150 mg Oral Given 5/16/22 0833)   busPIRone (BUSPAR) tablet 15 mg (15 mg Oral Given 5/15/22 1752)   cetirizine (ZYRTEC) tablet 10 mg (10 mg Oral Given 5/16/22 0832)   pantoprazole (PROTONIX) tablet 40 mg (40 mg Oral Given 5/16/22 0832)   fenofibrate nanocrystallized (TRICOR) tablet 145 mg (145 mg Oral Given 5/15/22 2349)   irbesartan (AVAPRO) tablet 300 mg ( Oral Automatically Held 5/27/22 0900)   bisacodyL (DULCOLAX) suppository 10 mg (10 mg Rectal Given 5/16/22 0634)   ketorolac (TORADOL) injection 15 mg (15 mg IntraVENous Given 5/13/22 0752)   LORazepam (ATIVAN) injection 0.5 mg (0.5 mg IntraVENous Given 5/13/22 0752)   iopamidoL (ISOVUE-370) 76 % injection 100 mL (80 mL IntraVENous Given 5/13/22 0900)   morphine injection 4 mg (4 mg IntraVENous Given 5/13/22 0947)   HYDROmorphone (DILAUDID) injection 0.5 mg (0.5 mg IntraVENous Given 5/13/22 1137)   cefTRIAXone (ROCEPHIN) 1 g in 0.9% sodium chloride (MBP/ADV) 50 mL MBP (0 g IntraVENous IV Completed 5/13/22 1206)   sodium chloride 0.9 % bolus infusion 500 mL (0 mL IntraVENous IV Completed 5/13/22 1547)   sodium chloride 0.9 % bolus infusion 500 mL (0 mL IntraVENous IV Completed 5/13/22 1547)   sodium chloride 0.9 % bolus infusion 500 mL (500 mL IntraVENous New Bag 5/14/22 2054)   vancomycin (VANCOCIN) 2000 mg in  ml infusion (2,000 mg IntraVENous New Bag 5/14/22 2255)   sodium chloride 0.9 % bolus infusion 500 mL (500 mL IntraVENous New Bag 5/14/22 2054)   methylPREDNISolone (PF) (SOLU-MEDROL) injection 40 mg (40 mg IntraVENous Given 5/14/22 2102)   albumin human 5% (BUMINATE) solution 25 g (25 g IntraVENous New Bag 5/15/22 0123)   ALPRAZolam Vester Mocha) tablet 0.5 mg (0.5 mg Oral Given 5/15/22 1003)         Admit Note  Patient is being admitted to the hospitalist.  The results of their tests and reasons for their admission have been discussed with them and/or available family. They convey agreement and understanding for the need to be admitted and for their admission diagnosis. Consultation has been made with the inpatient physician specialist for hospitalization. Disposition:  admit    PLAN:  1. admit    Diagnosis     Clinical Impression:   1. Sepsis, due to unspecified organism, unspecified whether acute organ dysfunction present ([facility] [address]    2. Community acquired pneumonia, unspecified laterality        Please note that this dictation was completed with Dragon, computer voice recognition software. Quite often unanticipated grammatical, syntax, homophones, and other interpretive errors are inadvertently transcribed by the computer software. Please disregard these errors. Additionally, please excuse any errors that have escaped final proofreading.

## 2022-05-13 NOTE — H&P
Hospitalist Admission Note    NAME: Guanakito Conrad   :  1955   MRN:  [id number]     Date/Time:  2022 4:35 PM    Patient PCP: Ruben May MD    Please note that this dictation was completed with [id number], the computer voice recognition software. Quite often unanticipated grammatical, syntax, homophones, and other interpretive errors are inadvertently transcribed by the computer software. Please disregard these errors. Please excuse any errors that have escaped final proofreading  ______________________________________________________________________   Assessment & Plan:  Sepsis, POA as evidence by fever 101, tachycardia due to  Bilateral lower lobe and lingula pna, POA rule out covid  Pleuritic chest pain, POA due to above  --cont azithromycin and rocephin. F/u blood cultures. --covid-19 pcr pending; rapid test negative  --check urine strep and urine legionella  --IVF hydration  --ibuprofen prn, tramadol prn for severe pain    Fibromyalgia  Anxiety/depression  Hyperlipidemia  Left carotid disease, follow with Dr. Brandon Luciano  --cont home meds    Obesity  Body mass index is 32.14 kg/m². Code: full  DVT prophylaxis: lovenox  Surrogate decision maker:            Subjective:   CHIEF COMPLAINT:  Pleuritic chest pain    HISTORY OF PRESENT ILLNESS:     Guanakito Conrad is a 75 y.o. female with anxiety, fibromyalgia, HTN, GERD presents with 3 days of generalized chest discomfort, worse with inspiration, subjective fever and chills. Took covid-19 home test and was negative. Pain relieved with Advil. Associated with fatigue, mild nonproductive cough, SOB. Per discussion with ER physician, patient tachypneic RR 25 in ER, intermittent tachycardia and looks ill, O2 sat 92-93% RA. CTA chest showed no PE, +lingula and bilateral lower lobe atelectasis. Has high suspicion for pneumonia and concern for potential sepsis so referred for admission. Patient admitted as observation.   While awaiting bed, she has spiked fever 101.2 and HR elevated now 104-135. We were asked to admit for work up and evaluation of the above problems. Past Medical History:   Diagnosis Date    Arthritis     Breast disorder 03/24/2011    fibrocystic breasts    Carotid stenosis, left     Chest pain, unspecified 11/10/2010    Chronic pain     abdomen/stomach/constipation    Essential hypertension, benign 11/10/2010    Family history of cardiovascular disease 11/10/2010    Fibromyalgia     Genital herpes     GERD (gastroesophageal reflux disease)     Hypertension     Mixed hyperlipidemia 11/10/2010    Nausea & vomiting     Nonspecific abnormal electrocardiogram (ECG) (EKG) 11/10/2010    Osteopenia     Palpitations 11/10/2010    Sleep apnea     does not sleep with machine but carries the dx       Past Surgical History:   Procedure Laterality Date    HX CATARACT REMOVAL      bilateral    HX GI      EGD x3 - polyp removed/still has 2 polyps    HX GYN      ovarian cystectomy-left     HX HEENT      left chest cystectomy    HX HYSTERECTOMY      HX ORTHOPAEDIC      left arm cystectomy-  age 12   [facility] OTHER SURGICAL      \"entwined nerves\" removed from above right ankle    HX SALPINGO-OOPHORECTOMY      HX TONSILLECTOMY      HX TUBAL LIGATION      WI BREAST SURGERY PROCEDURE UNLISTED      breast bxs - benign     Social History     Tobacco Use    Smoking status: Former Smoker     Packs/day: 0.50    Smokeless tobacco: Never Used    Tobacco comment: quit 2 1/2 years ago - was an on and off smoker then   Substance Use Topics    Alcohol use: Yes     Comment: occas      Drug use:  Denies  , retired.       Family History   Problem Relation Age of Onset    Post-op Nausea/Vomiting Mother     Heart Attack Mother     Cancer Paternal Aunt         breast    Stroke Father       Allergies   Allergen Reactions    Augmentin [Amoxicillin-Pot Clavulanate] Diarrhea and Nausea and Vomiting    Darvocet A500 [Propoxyphene N-Acetaminophen] Other (comments)     Heart flutters    Doxycycline Other (comments)     Stomach pain and diarrhea. Has taken since without problems.  Gentamicin Swelling    Percocet [Oxycodone-Acetaminophen] Rash and Itching    Stelazine Swelling     Tongue swelling    Sulfa (Sulfonamide Antibiotics) Hives    Sumycin [Tetracycline] Other (comments)     Tongue tingling    Zegerid [Omeprazole-Sodium Bicarbonate] Unknown (comments)     Takes nexium without problems, but if she takes zegerid, she has nausea and sweating. Prior to Admission medications    Medication Sig Start Date End Date Taking? Authorizing Provider   buPROPion XL (WELLBUTRIN XL) 150 mg tablet Take 150 mg by mouth daily. Yes Provider, Historical   alendronate (FOSAMAX) 70 mg tablet Take 70 mg by mouth Every Saturday. Yes Provider, Historical   ALPRAZolam (XANAX) 0.5 mg tablet Take 1 Tab by mouth daily as needed for Anxiety. Patient taking differently: Take 1 mg by mouth nightly. 12/27/18  Yes Renu Montano MD   busPIRone (BUSPAR) 10 mg tablet Take 1 Tab by mouth two (2) times a day. Patient taking differently: Take 15 mg by mouth every evening. 12/6/18  Yes Renu Montano MD   irbesartan (AVAPRO) 300 mg tablet Take 300 mg by mouth nightly. Yes Provider, Historical   cetirizine (ZYRTEC) 10 mg tablet Take  by mouth. Yes Provider, Historical   amLODIPine (NORVASC) 5 mg tablet Take 10 mg by mouth daily. Yes Provider, Historical   ASPIRIN PO Take 81 mg by mouth daily. Yes Provider, Historical   esomeprazole (NEXIUM) 20 mg capsule Take 20 mg by mouth daily. Yes Provider, Historical   estradiol (VAGIFEM) 10 mcg tab vaginal tablet Insert 10 mcg into vagina two (2) days a week. Yes Provider, Historical   fenofibrate 160 mg Tab Take 1 Tab by mouth nightly. Yes Provider, Historical   ONETOUCH ULTRA TEST strip  2/16/18   Provider, Historical     REVIEW OF SYSTEMS:  POSITIVE= Bold.   Negative = normal text  General:  Subjective fever, chills, sweats, generalized weakness, weight loss/gain, loss of appetite  Eyes:  blurred vision, eye pain, loss of vision, diplopia  Ear Nose and Throat:  rhinorrhea, pharyngitis  Respiratory:   cough, sputum production, SOB, wheezing, PEDERSEN, pleuritic pain  Cardiology:  chest pain, palpitations, orthopnea, PND, edema, syncope   Gastrointestinal:  abdominal pain, N/V, dysphagia, diarrhea, constipation, bleeding  Genitourinary:  frequency, urgency, dysuria, hematuria, incontinence  Muskuloskeletal :  arthralgia, myalgia  Hematology:  easy bruising, bleeding, lymphadenopathy  Dermatological:  rash, ulceration, pruritis  Endocrine:  hot flashes or polydipsia  Neurological:  headache, dizziness, confusion, focal weakness, paresthesia, memory loss, gait disturbance  Psychological: anxiety, depression, agitation      Objective:   VITALS:    Visit Vitals  /62   Pulse (!) 107   Temp (!) 101.2 °F (38.4 °C)   Resp 26   Ht 5' 2\" (1.575 m)   Wt 79.7 kg (175 lb 11.3 oz)   SpO2 94%   BMI 32.14 kg/m²     Temp (24hrs), Av.7 °F (37.6 °C), Min:98.2 °F (36.8 °C), Max:101.2 °F (38.4 °C)    Body mass index is 32.14 kg/m². PHYSICAL EXAM:    General:    Alert, cooperative, no distress, appears stated age. HEENT: Atraumatic, anicteric sclerae, pink conjunctivae     No oral ulcers, mucosa moist, no OP redness or exudate throat clear. Hearing intact. Neck:  Supple, symmetrical,  thyroid: non tender  Lungs:   Crackles throughout lower half lung field b/l. No Wheezing or Rhonchi. Chest wall:  No tenderness  No Accessory muscle use. Heart:   irregular  rhythm,  Tachycardic  to 135, sinus tach with PACs on monitor, 1/6 systolic  murmur   No gallop. No edema. Abdomen:   Soft, non-tender. Not distended. Bowel sounds normal. No masses  Extremities: No cyanosis. No clubbing  Skin:     Not pale Not Jaundiced  No rashes   Psych:  Good insight. Not depressed.   Not anxious or agitated. Neurologic: EOMs intact. No facial asymmetry. No aphasia or slurred speech. Symmetrical strength, Alert and oriented X 3. Peripheral pulse: Bilateral, DP, 2+  Capillary refill:  normal    IMAGING RESULTS:   []       I have personally reviewed the actual   []     CXR  []     CT scan  CXR:  CT :  EKG:   ________________________________________________________________________  Care Plan discussed with:    Comments   Patient y    Family  y  bedside   RN     Care Manager                    Consultant:      ________________________________________________________________________  Prophylaxis:  GI PPI   DVT lovenox   ________________________________________________________________________  Recommended Disposition:   Home with Family y   HH/PT/OT/RN    SNF/LTC    JAE    ________________________________________________________________________  Code Status:  Full Code y   DNR/DNI    ________________________________________________________________________  TOTAL TIME:  50 minutes      ______________________________________________________________________  Andrea Sanchez MD      Procedures: see electronic medical records for all procedures/Xrays and details which were not copied into this note but were reviewed prior to creation of Plan.     LAB DATA REVIEWED:    Recent Results (from the past 24 hour(s))   EKG, 12 LEAD, INITIAL    Collection Time: 05/13/22  7:26 AM   Result Value Ref Range    Ventricular Rate 82 BPM    Atrial Rate 82 BPM    P-R Interval 178 ms    QRS Duration 88 ms    Q-T Interval 376 ms    QTC Calculation (Bezet) 439 ms    Calculated P Axis 14 degrees    Calculated R Axis 50 degrees    Calculated T Axis 74 degrees    Diagnosis       Normal sinus rhythm  Nonspecific T wave abnormality  When compared with ECG of 10-MAR-2011 11:26,  Nonspecific T wave abnormality no longer evident in Lateral leads  Confirmed by Solomon Frankel ([id number]) on 5/13/2022 10:14:26 AM     CBC WITH AUTOMATED DIFF Collection Time: 05/13/22  7:38 AM   Result Value Ref Range    WBC 10.9 3.6 - 11.0 K/uL    RBC 3.73 (L) 3.80 - 5.20 M/uL    HGB 11.2 (L) 11.5 - 16.0 g/dL    HCT 33.5 (L) 35.0 - 47.0 %    MCV 89.8 80.0 - 99.0 FL    MCH 30.0 26.0 - 34.0 PG    MCHC 33.4 30.0 - 36.5 g/dL    RDW 12.3 11.5 - 14.5 %    PLATELET 937 317 - 729 K/uL    MPV 10.1 8.9 - 12.9 FL    NRBC 0.0 0  WBC    ABSOLUTE NRBC 0.00 0.00 - 0.01 K/uL    NEUTROPHILS 80 (H) 32 - 75 %    LYMPHOCYTES 10 (L) 12 - 49 %    MONOCYTES 7 5 - 13 %    EOSINOPHILS 1 0 - 7 %    BASOPHILS 1 0 - 1 %    IMMATURE GRANULOCYTES 1 (H) 0.0 - 0.5 %    ABS. NEUTROPHILS 8.8 (H) 1.8 - 8.0 K/UL    ABS. LYMPHOCYTES 1.1 0.8 - 3.5 K/UL    ABS. MONOCYTES 0.8 0.0 - 1.0 K/UL    ABS. EOSINOPHILS 0.1 0.0 - 0.4 K/UL    ABS. BASOPHILS 0.1 0.0 - 0.1 K/UL    ABS. IMM. GRANS. 0.1 (H) 0.00 - 0.04 K/UL    DF AUTOMATED     METABOLIC PANEL, COMPREHENSIVE    Collection Time: 05/13/22  7:38 AM   Result Value Ref Range    Sodium 138 136 - 145 mmol/L    Potassium 3.8 3.5 - 5.1 mmol/L    Chloride 107 97 - 108 mmol/L    CO2 25 21 - 32 mmol/L    Anion gap 6 5 - 15 mmol/L    Glucose 154 (H) 65 - 100 mg/dL    BUN 21 (H) 6 - 20 MG/DL    Creatinine 0.91 0.55 - 1.02 MG/DL    BUN/Creatinine ratio 23 (H) 12 - 20      GFR est AA >60 >60 ml/min/1.73m2    GFR est non-AA >60 >60 ml/min/1.73m2    Calcium 9.5 8.5 - 10.1 MG/DL    Bilirubin, total 0.8 0.2 - 1.0 MG/DL    ALT (SGPT) 22 12 - 78 U/L    AST (SGOT) 14 (L) 15 - 37 U/L    Alk.  phosphatase 44 (L) 45 - 117 U/L    Protein, total 7.4 6.4 - 8.2 g/dL    Albumin 3.5 3.5 - 5.0 g/dL    Globulin 3.9 2.0 - 4.0 g/dL    A-G Ratio 0.9 (L) 1.1 - 2.2     NT-PRO BNP    Collection Time: 05/13/22  7:38 AM   Result Value Ref Range    NT pro- (H) <125 PG/ML   TROPONIN-HIGH SENSITIVITY    Collection Time: 05/13/22  7:38 AM   Result Value Ref Range    Troponin-High Sensitivity 5 0 - 51 ng/L   LIPASE    Collection Time: 05/13/22  7:38 AM   Result Value Ref Range    Lipase 94 73 - 393 U/L   D DIMER    Collection Time: 05/13/22  7:43 AM   Result Value Ref Range    D-dimer 0.82 (H) 0.00 - 0.65 mg/L FEU   COVID-19 RAPID TEST    Collection Time: 05/13/22  7:55 AM   Result Value Ref Range    Specimen source Nasopharyngeal      COVID-19 rapid test Not detected NOTD     INFLUENZA A+B VIRAL AGS    Collection Time: 05/13/22  9:29 AM   Result Value Ref Range    Influenza A Antigen Negative NEG      Influenza B Antigen Negative NEG     TROPONIN-HIGH SENSITIVITY    Collection Time: 05/13/22 11:45 AM   Result Value Ref Range    Troponin-High Sensitivity 16 0 - 51 ng/L   URINALYSIS W/ REFLEX CULTURE    Collection Time: 05/13/22  1:00 PM    Specimen: Urine   Result Value Ref Range    Color RYAN      Appearance CLEAR CLEAR      Specific gravity 1.010 1.003 - 1.030      pH (UA) 5.0 5.0 - 8.0      Protein Negative NEG mg/dL    Glucose Negative NEG mg/dL    Ketone Negative NEG mg/dL    Bilirubin Negative NEG      Blood Negative NEG      Urobilinogen 0.2 0.2 - 1.0 EU/dL    Nitrites Negative NEG      Leukocyte Esterase Negative NEG      WBC 5-10 0 - 4 /hpf    RBC 0-5 0 - 5 /hpf    Epithelial cells FEW FEW /lpf    Bacteria Negative NEG /hpf    UA:UC IF INDICATED CULTURE NOT INDICATED BY UA RESULT CNI     PROCALCITONIN    Collection Time: 05/13/22  2:38 PM   Result Value Ref Range    Procalcitonin 0.08 ng/mL   POC LACTIC ACID    Collection Time: 05/13/22  3:15 PM   Result Value Ref Range    Lactic Acid (POC) 0.66 0.40 - 2.00 mmol/L

## 2022-05-13 NOTE — ED NOTES
TRANSITION OF CARE - SBAR OUT    Patient is being transferred to hospitals 3 [facility] Tele, Room# 2115. Report GIVEN TO Stan Morrison RN on Scherry Bolus for routine progression of care. Report is consisted of the following information SBAR, ED Summary, MAR and Recent Results. Patient transferred to receiving unit by: Transport staff (RN or [facility] Name)     Called outstanding consults: No    Collected routine labs: No      All current orders reviewed with accepting nurse: Yes    The following personal items will be sent with the patient during transfer to the floor:   All valuables:      Visual Aid: None                   CARDIAC MONITORING ORDERED: Yes Tele    The following CURRENT information were reported to the receiving RN:    CODE STATUS: Prior    NIH SCORE:    SONY SCREENING:      NEURO ASSESSMENT:        RESTRAINTS IN USE: No      IS DOCUMENTATION COMPLETE: Yes      Vital Signs  Level of Consciousness: Alert (0) (05/13/22 0720)  Temp: 98.2 °F (36.8 °C) (05/13/22 0720)  Temp Source: Oral (05/13/22 0720)  Pulse (Heart Rate): (!) 102 (05/13/22 1400)  Resp Rate: 23 (05/13/22 1400)  BP: 111/81 (05/13/22 1400)  MAP (Monitor): 92 (05/13/22 1400)  MAP (Calculated): 91 (05/13/22 1400)  BP 1 Location: Left upper arm (05/13/22 0720)  BP 1 Method: Automatic (05/13/22 0720)  MEWS Score: 1 (05/13/22 0720)  Pain 1  Pain Scale 1: Numeric (0 - 10) (05/13/22 0720)  Pain Intensity 1: 6 (05/13/22 0720)  Pain Location 1: Chest (05/13/22 0720)  Pain Orientation 1: Mid,Upper (05/13/22 0720)  Pain Description 1: Sharp (05/13/22 0720)      OXYGEN: Oxygen Therapy  O2 Device: None (Room air) (05/13/22 0806)    ANIA FALL RISK: Ginger Trinidad Fall Risk  Mobility: Ambulates without gait disturbance (05/13/22 0741)  Mentation: Alert, oriented x 3 (05/13/22 0741)  Medication: No anticonvulsants, sedatives(tranquilizers), psychotropics or hypnotics, hypoglycemics, narcotics, sleep aids, antihypertensives, laxatives, or diuretics (05/13/22 7436)      WOUNDS: No URINARY CATHETER: voiding. Uses bedside commode     LINE ACCESS:   Peripheral IV 05/13/22 Left Antecubital (Active)        Opportunity for questions and clarification were provided.   Pippa Dunlap RN

## 2022-05-13 NOTE — REMOTE MONITORING
Attempted to contact primary RN in regards to the sepsis bundle.     Cecilia Colorado MSN, [id number] [facility]  [phone]

## 2022-05-14 LAB
ANION GAP SERPL CALC-SCNC: 8 MMOL/L (ref 5–15)
BASOPHILS # BLD: 0.1 K/UL (ref 0–0.1)
BASOPHILS NFR BLD: 1 % (ref 0–1)
BUN SERPL-MCNC: 22 MG/DL (ref 6–20)
BUN/CREAT SERPL: 27 (ref 12–20)
CALCIUM SERPL-MCNC: 8.3 MG/DL (ref 8.5–10.1)
CHLORIDE SERPL-SCNC: 110 MMOL/L (ref 97–108)
CO2 SERPL-SCNC: 21 MMOL/L (ref 21–32)
CREAT SERPL-MCNC: 0.82 MG/DL (ref 0.55–1.02)
DIFFERENTIAL METHOD BLD: ABNORMAL
EOSINOPHIL # BLD: 0.2 K/UL (ref 0–0.4)
EOSINOPHIL NFR BLD: 2 % (ref 0–7)
ERYTHROCYTE [DISTWIDTH] IN BLOOD BY AUTOMATED COUNT: 12.4 % (ref 11.5–14.5)
GLUCOSE BLD STRIP.AUTO-MCNC: 185 MG/DL (ref 65–117)
GLUCOSE SERPL-MCNC: 141 MG/DL (ref 65–100)
HCT VFR BLD AUTO: 29.2 % (ref 35–47)
HGB BLD-MCNC: 9.7 G/DL (ref 11.5–16)
IMM GRANULOCYTES # BLD AUTO: 0.1 K/UL (ref 0–0.04)
IMM GRANULOCYTES NFR BLD AUTO: 1 % (ref 0–0.5)
LYMPHOCYTES # BLD: 1.5 K/UL (ref 0.8–3.5)
LYMPHOCYTES NFR BLD: 16 % (ref 12–49)
MCH RBC QN AUTO: 30.4 PG (ref 26–34)
MCHC RBC AUTO-ENTMCNC: 33.2 G/DL (ref 30–36.5)
MCV RBC AUTO: 91.5 FL (ref 80–99)
MONOCYTES # BLD: 0.8 K/UL (ref 0–1)
MONOCYTES NFR BLD: 9 % (ref 5–13)
NEUTS SEG # BLD: 6.9 K/UL (ref 1.8–8)
NEUTS SEG NFR BLD: 71 % (ref 32–75)
NRBC # BLD: 0 K/UL (ref 0–0.01)
NRBC BLD-RTO: 0 PER 100 WBC
PLATELET # BLD AUTO: 214 K/UL (ref 150–400)
PMV BLD AUTO: 10.3 FL (ref 8.9–12.9)
POTASSIUM SERPL-SCNC: 3.5 MMOL/L (ref 3.5–5.1)
RBC # BLD AUTO: 3.19 M/UL (ref 3.8–5.2)
SARS-COV-2, XPLCVT: NOT DETECTED
SERVICE CMNT-IMP: ABNORMAL
SODIUM SERPL-SCNC: 139 MMOL/L (ref 136–145)
SOURCE, COVRS: NORMAL
WBC # BLD AUTO: 9.4 K/UL (ref 3.6–11)

## 2022-05-14 PROCEDURE — 74011250636 HC RX REV CODE- 250/636: Performed by: NURSE PRACTITIONER

## 2022-05-14 PROCEDURE — 96372 THER/PROPH/DIAG INJ SC/IM: CPT

## 2022-05-14 PROCEDURE — G0378 HOSPITAL OBSERVATION PER HR: HCPCS

## 2022-05-14 PROCEDURE — 74011000250 HC RX REV CODE- 250: Performed by: HOSPITALIST

## 2022-05-14 PROCEDURE — 74011250636 HC RX REV CODE- 250/636: Performed by: EMERGENCY MEDICINE

## 2022-05-14 PROCEDURE — 96375 TX/PRO/DX INJ NEW DRUG ADDON: CPT

## 2022-05-14 PROCEDURE — 96376 TX/PRO/DX INJ SAME DRUG ADON: CPT

## 2022-05-14 PROCEDURE — 85025 COMPLETE CBC W/AUTO DIFF WBC: CPT

## 2022-05-14 PROCEDURE — 74011250636 HC RX REV CODE- 250/636: Performed by: INTERNAL MEDICINE

## 2022-05-14 PROCEDURE — 74011250637 HC RX REV CODE- 250/637: Performed by: HOSPITALIST

## 2022-05-14 PROCEDURE — 74011000258 HC RX REV CODE- 258: Performed by: HOSPITALIST

## 2022-05-14 PROCEDURE — 80048 BASIC METABOLIC PNL TOTAL CA: CPT

## 2022-05-14 PROCEDURE — 36415 COLL VENOUS BLD VENIPUNCTURE: CPT

## 2022-05-14 PROCEDURE — 74011250637 HC RX REV CODE- 250/637: Performed by: NURSE PRACTITIONER

## 2022-05-14 PROCEDURE — 74011250636 HC RX REV CODE- 250/636: Performed by: HOSPITALIST

## 2022-05-14 PROCEDURE — 82962 GLUCOSE BLOOD TEST: CPT

## 2022-05-14 RX ORDER — VANCOMYCIN 2 GRAM/500 ML IN 0.9 % SODIUM CHLORIDE INTRAVENOUS
2000 ONCE
Status: COMPLETED | OUTPATIENT
Start: 2022-05-14 | End: 2022-05-15

## 2022-05-14 RX ORDER — ALBUMIN HUMAN 50 G/1000ML
25 SOLUTION INTRAVENOUS ONCE
Status: COMPLETED | OUTPATIENT
Start: 2022-05-14 | End: 2022-05-15

## 2022-05-14 RX ADMIN — AZITHROMYCIN MONOHYDRATE 500 MG: 500 INJECTION, POWDER, LYOPHILIZED, FOR SOLUTION INTRAVENOUS at 12:50

## 2022-05-14 RX ADMIN — METHYLPREDNISOLONE SODIUM SUCCINATE 40 MG: 40 INJECTION, POWDER, FOR SOLUTION INTRAMUSCULAR; INTRAVENOUS at 21:02

## 2022-05-14 RX ADMIN — CEFTRIAXONE 1 G: 1 INJECTION, POWDER, FOR SOLUTION INTRAMUSCULAR; INTRAVENOUS at 10:52

## 2022-05-14 RX ADMIN — ASPIRIN 81 MG: 81 TABLET, COATED ORAL at 10:28

## 2022-05-14 RX ADMIN — ONDANSETRON 4 MG: 2 INJECTION INTRAMUSCULAR; INTRAVENOUS at 19:12

## 2022-05-14 RX ADMIN — IRBESARTAN 300 MG: 150 TABLET ORAL at 10:52

## 2022-05-14 RX ADMIN — SODIUM CHLORIDE, PRESERVATIVE FREE 10 ML: 5 INJECTION INTRAVENOUS at 13:07

## 2022-05-14 RX ADMIN — BUSPIRONE HYDROCHLORIDE 15 MG: 7.5 TABLET ORAL at 17:39

## 2022-05-14 RX ADMIN — ENOXAPARIN SODIUM 40 MG: 100 INJECTION SUBCUTANEOUS at 10:28

## 2022-05-14 RX ADMIN — SODIUM CHLORIDE 500 ML: 9 INJECTION, SOLUTION INTRAVENOUS at 20:54

## 2022-05-14 RX ADMIN — PANTOPRAZOLE SODIUM 40 MG: 40 TABLET, DELAYED RELEASE ORAL at 10:28

## 2022-05-14 RX ADMIN — AMLODIPINE BESYLATE 10 MG: 5 TABLET ORAL at 10:28

## 2022-05-14 RX ADMIN — SODIUM CHLORIDE, PRESERVATIVE FREE 10 ML: 5 INJECTION INTRAVENOUS at 23:12

## 2022-05-14 RX ADMIN — FENOFIBRATE 145 MG: 145 TABLET ORAL at 23:08

## 2022-05-14 RX ADMIN — CETIRIZINE HYDROCHLORIDE 10 MG: 10 TABLET, FILM COATED ORAL at 10:28

## 2022-05-14 RX ADMIN — IBUPROFEN 600 MG: 600 TABLET ORAL at 16:22

## 2022-05-14 RX ADMIN — VANCOMYCIN HYDROCHLORIDE 2000 MG: 10 INJECTION, POWDER, LYOPHILIZED, FOR SOLUTION INTRAVENOUS at 22:55

## 2022-05-14 RX ADMIN — BUPROPION HYDROCHLORIDE 150 MG: 150 TABLET, EXTENDED RELEASE ORAL at 10:28

## 2022-05-14 RX ADMIN — SODIUM CHLORIDE 125 ML/HR: 9 INJECTION, SOLUTION INTRAVENOUS at 00:35

## 2022-05-14 NOTE — PROGRESS NOTES
Rapid Response Team Note    Called by RN at 7:17 PM to see patient for acute symptomatic hypotension while receiving Azithromycin IV stat. Upon entering the room the patient was complaining of feeling lightheaded and nauseated. Events as described by RN caring for patient noted. Visit Vitals  BP (!) 93/44   Pulse 71   Temp 98.2 °F (36.8 °C)   Resp 18   Ht 5' 2\" (1.575 m)   Wt 79.7 kg (175 lb 11.3 oz)   SpO2 95%   BMI 32.14 kg/m²       Gen: awake, alert, in NAD  HEENT: wnl  Chest: symmetrical  Abd: soft, non tender  Neuro: AAO  Ext: TALAVERA    Pertinent Recent Labs and Xrays:  Recent Labs     05/14/22  0425 05/13/22  0738   WBC 9.4 10.9   HGB 9.7* 11.2*   HCT 29.2* 33.5*    248     Recent Labs     05/14/22  0425 05/13/22  0738    138   K 3.5 3.8   * 107   CO2 21 25   BUN 22* 21*   CREA 0.82 0.91   * 154*   CA 8.3* 9.5     No results for input(s): INR, INREXT in the last 72 hours. No results for input(s): PH, PCO2, PO2 in the last 72 hours. No results for input(s): PHI, PO2I, PCO2I in the last 72 hours. No results for input(s): CPK, CKNDX, TROIQ in the last 72 hours.     No lab exists for component: CPKMB  Lab Results   Component Value Date/Time    Glucose (POC) 185 (H) 05/14/2022 07:20 PM       A/P: Hypotension, suspect reaction from Azithromycin  -  ml IV bolus x 2  - Solumedrol 40 mg IV x 1 dose  - Increase maintenance IV rate to 75 ml/hour  - discontinued Azithromycion  - pharmacy consult for Vancomycin dosing    Shira Sánchez NP  Critical care time unrelated to prior notes: 20 minutes    0930: Notified of persistent hypotension s/p bolus and IV Solumedrol  VS: BP 97/43,     - 5% Albumin 25 g IV x 1 dose ordered  - 5% Albumin 25 g IV x 1 dose ordered

## 2022-05-14 NOTE — PROGRESS NOTES
Pharmacy Antimicrobial Kinetic Dosing    Indication for Antimicrobials: CAP     Current Regimen of Each Antimicrobial:  Vancomycin -- pharmacy to dose (Start Date 22; Day # 1)  Ceftriaxone 1g every 24 hours (Start Date 22; Day # 1)    Previous Antimicrobial Therapy:  Azithromycin (22-22 -- experienced allergic reaction)    Goal Level: AUC: 400-600 mg/hr/Liter/day    Date Dose & Interval Measured (mcg/mL) Predicted AUC/LUC                       Date & time of next level: N/A    Dosing calculator used: Flazio calculator    Significant Positive Cultures:    blood: NGTD, pending    Conditions for Dosing Consideration: None    Labs:  Recent Labs     22  04222  1438 22  0738   CREA 0.82  --  0.91   BUN 22*  --  21*   PCT  --  0.08  --      Recent Labs     22  0738   WBC 9.4 10.9     Temp (24hrs), Av.8 °F (37.1 °C), Min:98.2 °F (36.8 °C), Max:99.9 °F (37.7 °C)    Creatinine Clearance (mL/min):   CrCl (Ideal Body Weight): 51.9   If actual weight < IBW: CrCl (Actual Body Weight) 82.6    Impression/Plan:   Therapy was initiated with a loading dose of 2000 mg IV x 1 to be followed by a maintenance regimen of 750 mg IV every 12 hours for a predicted AUC of 446  Continue ceftriaxone as ordered  Recommend ordering random level in 24 to 48 hours (not yet ordered)  Daily BMP ordered  Antimicrobial stop date 7 days (CTX), TBD (vancomycin)     Pharmacy will follow daily and adjust medications as appropriate for renal function and/or serum levels.     Thank you,  ANDREINA Li    Vancomycin Dosing Document    Documents located on pharmacy Teams site: Clinical Practice -> Antimicrobial Stewardship -> Antibiotics_Vancomycin     Aminoglycoside Dosing Document    Documents located on pharmacy Teams site: Clinical Practice -> Antimicrobial Stewardship -> Antibiotics_Aminoglycosides

## 2022-05-14 NOTE — PROGRESS NOTES
Medicare Outpatient Observation Notice (MOON) notice verbally explained to patient/representative over the phone due to droplet precautions for COVID-19 rule out. Time allotted for questions regarding the notice. Copy placed on bedside chart.     Verner Picker, MSN, RN  Care Manager

## 2022-05-14 NOTE — PROGRESS NOTES
With pt's night time vital, pt's BP is 102/51(67). Pt has 300mg Irbesartan due. Sameer Holder, JOSE LUIS notified. Per provider, okay to hold. Medication held.

## 2022-05-14 NOTE — PROGRESS NOTES
MEWS triggered. Pt noted to be tachypneic (likely due to getting OOB to the bathroom) and tachycardic (HR- low 100s/irregular(Afib)). Natalio Cano, JOSE LUIS notified. Per provider, CTM.

## 2022-05-14 NOTE — PROGRESS NOTES
Hospitalist Progress Note    NAME: Earl Bravo   :  1955   MRN:  [id number]       Assessment / Plan:  Sepsis, POA as evidence by fever 101, tachycardia due to  Bilateral lower lobe and lingula pna, POA rule out covid  Pleuritic chest pain, POA due to above    --cont azithromycin and rocephin. F/u blood cultures. --COVID-negative  --check urine strep and urine legionella, pending  --IVF hydration  --ibuprofen prn, tramadol prn for severe pain  --Add incentive spirometer     Fibromyalgia  Anxiety/depression  Hyperlipidemia  Left carotid disease, follow with Dr. Breonna Sow  --cont home meds     Obesity  Body mass index is 32.14 kg/m².     Code: full  DVT prophylaxis: lovenox  Surrogate decision maker:      -Anticipate discharge tomorrow  Barriers, none     Subjective:     Chief Complaint / Reason for Physician Visit  \" Reports she feels better. Sitting in the chair. On room air\". Discussed with RN events overnight. Review of Systems:  Symptom Y/N Comments  Symptom Y/N Comments   Fever/Chills    Chest Pain     Poor Appetite    Edema     Cough    Abdominal Pain     Sputum    Joint Pain     SOB/PEDERSEN    Pruritis/Rash     Nausea/vomit    Tolerating PT/OT     Diarrhea    Tolerating Diet     Constipation    Other       Could NOT obtain due to:      Objective:     VITALS:   Last 24hrs VS reviewed since prior progress note.  Most recent are:  Patient Vitals for the past 24 hrs:   Temp Pulse Resp BP SpO2   22 1052 98.9 °F (37.2 °C) 87 18 (!) 121/52 94 %   22 0430 98.8 °F (37.1 °C) 89 18 117/60 92 %   22 0033 98.2 °F (36.8 °C) (!) 102 28 (!) 108/59 93 %   22 2244 -- -- -- -- 96 %   22 2232 98.5 °F (36.9 °C) 100 24 (!) 102/51 92 %   22 1530 (!) 101.2 °F (38.4 °C) (!) 107 26 113/62 94 %   22 1400 -- (!) 102 23 111/81 95 %       Intake/Output Summary (Last 24 hours) at 2022 1247  Last data filed at 2022 0430  Gross per 24 hour   Intake 960.47 ml   Output 250 ml Net 710.47 ml        I had a face to face encounter and independently examined this patient on 5/14/2022, as outlined below:  PHYSICAL EXAM:  General: WD, WN. Alert, cooperative, no acute distress    EENT:  EOMI. Anicteric sclerae. MMM  Resp:  Minimal crackles at bases  CV:  Regular  rhythm,  No edema  GI:  Soft, Non distended, Non tender. +Bowel sounds  Neurologic:  Alert and oriented X 3, normal speech,   Psych:   Good insight. Not anxious nor agitated  Skin:  No rashes. No jaundice    Reviewed most current lab test results and cultures  YES  Reviewed most current radiology test results   YES  Review and summation of old records today    NO  Reviewed patient's current orders and MAR    YES  PMH/SH reviewed - no change compared to H&P  ________________________________________________________________________  Care Plan discussed with:    Comments   Patient x    Family      RN x    Care Manager     Consultant                        Multidiciplinary team rounds were held today with , nursing, pharmacist and clinical coordinator. Patient's plan of care was discussed; medications were reviewed and discharge planning was addressed. ________________________________________________________________________  Total NON critical care TIME:  23   Minutes    Total CRITICAL CARE TIME Spent:   Minutes non procedure based      Comments   >50% of visit spent in counseling and coordination of care     ________________________________________________________________________  Carlos MD Lakeisha     Procedures: see electronic medical records for all procedures/Xrays and details which were not copied into this note but were reviewed prior to creation of Plan. LABS:  I reviewed today's most current labs and imaging studies.   Pertinent labs include:  Recent Labs     05/14/22 0425 05/13/22  0738   WBC 9.4 10.9   HGB 9.7* 11.2*   HCT 29.2* 33.5*    248     Recent Labs     05/14/22 0425 05/13/22  0738   NA 139 138   K 3.5 3.8   * 107   CO2 21 25   * 154*   BUN 22* 21*   CREA 0.82 0.91   CA 8.3* 9.5   ALB  --  3.5   TBILI  --  0.8   ALT  --  22       Signed: Annabelle Rodrigez MD

## 2022-05-15 LAB
ANION GAP SERPL CALC-SCNC: 6 MMOL/L (ref 5–15)
BUN SERPL-MCNC: 11 MG/DL (ref 6–20)
BUN/CREAT SERPL: 16 (ref 12–20)
CALCIUM SERPL-MCNC: 8.2 MG/DL (ref 8.5–10.1)
CHLORIDE SERPL-SCNC: 116 MMOL/L (ref 97–108)
CO2 SERPL-SCNC: 21 MMOL/L (ref 21–32)
CREAT SERPL-MCNC: 0.68 MG/DL (ref 0.55–1.02)
GLUCOSE SERPL-MCNC: 201 MG/DL (ref 65–100)
POTASSIUM SERPL-SCNC: 3.9 MMOL/L (ref 3.5–5.1)
SODIUM SERPL-SCNC: 143 MMOL/L (ref 136–145)

## 2022-05-15 PROCEDURE — 96376 TX/PRO/DX INJ SAME DRUG ADON: CPT

## 2022-05-15 PROCEDURE — 74011250636 HC RX REV CODE- 250/636: Performed by: NURSE PRACTITIONER

## 2022-05-15 PROCEDURE — 74011250637 HC RX REV CODE- 250/637: Performed by: INTERNAL MEDICINE

## 2022-05-15 PROCEDURE — 74011000258 HC RX REV CODE- 258: Performed by: HOSPITALIST

## 2022-05-15 PROCEDURE — 80048 BASIC METABOLIC PNL TOTAL CA: CPT

## 2022-05-15 PROCEDURE — 96375 TX/PRO/DX INJ NEW DRUG ADDON: CPT

## 2022-05-15 PROCEDURE — 74011250636 HC RX REV CODE- 250/636: Performed by: HOSPITALIST

## 2022-05-15 PROCEDURE — P9045 ALBUMIN (HUMAN), 5%, 250 ML: HCPCS | Performed by: NURSE PRACTITIONER

## 2022-05-15 PROCEDURE — 96372 THER/PROPH/DIAG INJ SC/IM: CPT

## 2022-05-15 PROCEDURE — 74011000250 HC RX REV CODE- 250: Performed by: HOSPITALIST

## 2022-05-15 PROCEDURE — 74011250637 HC RX REV CODE- 250/637: Performed by: HOSPITALIST

## 2022-05-15 PROCEDURE — G0378 HOSPITAL OBSERVATION PER HR: HCPCS

## 2022-05-15 RX ORDER — ALPRAZOLAM 0.5 MG/1
0.5 TABLET ORAL ONCE
Status: COMPLETED | OUTPATIENT
Start: 2022-05-15 | End: 2022-05-15

## 2022-05-15 RX ADMIN — FENOFIBRATE 145 MG: 145 TABLET ORAL at 23:49

## 2022-05-15 RX ADMIN — SODIUM CHLORIDE, PRESERVATIVE FREE 10 ML: 5 INJECTION INTRAVENOUS at 09:49

## 2022-05-15 RX ADMIN — ALPRAZOLAM 1 MG: 0.5 TABLET ORAL at 23:50

## 2022-05-15 RX ADMIN — PANTOPRAZOLE SODIUM 40 MG: 40 TABLET, DELAYED RELEASE ORAL at 10:03

## 2022-05-15 RX ADMIN — ENOXAPARIN SODIUM 40 MG: 100 INJECTION SUBCUTANEOUS at 10:04

## 2022-05-15 RX ADMIN — SODIUM CHLORIDE 75 ML/HR: 9 INJECTION, SOLUTION INTRAVENOUS at 09:53

## 2022-05-15 RX ADMIN — ALPRAZOLAM 0.5 MG: 0.5 TABLET ORAL at 10:03

## 2022-05-15 RX ADMIN — CEFTRIAXONE 1 G: 1 INJECTION, POWDER, FOR SOLUTION INTRAMUSCULAR; INTRAVENOUS at 11:24

## 2022-05-15 RX ADMIN — ALBUMIN (HUMAN) 25 G: 12.5 INJECTION, SOLUTION INTRAVENOUS at 01:23

## 2022-05-15 RX ADMIN — ASPIRIN 81 MG: 81 TABLET, COATED ORAL at 10:03

## 2022-05-15 RX ADMIN — BUSPIRONE HYDROCHLORIDE 15 MG: 7.5 TABLET ORAL at 17:52

## 2022-05-15 RX ADMIN — SODIUM CHLORIDE, PRESERVATIVE FREE 10 ML: 5 INJECTION INTRAVENOUS at 17:54

## 2022-05-15 RX ADMIN — BUPROPION HYDROCHLORIDE 150 MG: 150 TABLET, EXTENDED RELEASE ORAL at 10:04

## 2022-05-15 NOTE — PROGRESS NOTES
[address] to Pt room concerning Pt feeling weep, nauseated, and Pt having allergy to oral azithromycin by Pt daughter. Immediately stopped IV infusion, changed IV lines, and gave Zofran PRN for nausea. Pt does not have any c/o itchy throat or tightness of airways. BP low, SpO2 dropped below 90, O2 placed at 2L. Rapid called for further review by provider. Pharmacy notified of reaction. Pt had been having IV fluids run slow after IV site change d/t small bore needle and IV site location \"burning some. \" Pt received half of daily dose. Oncoming staff apprised of situation.

## 2022-05-15 NOTE — PROGRESS NOTES
Hospitalist Progress Note    NAME: Beto Clark   :  1955   MRN:  [id number]       Assessment / Plan:  Sepsis, POA as evidence by fever 101, tachycardia due to  Bilateral lower lobe and lingula pna, POA  Pleuritic chest pain, POA due to above    --CTA chest negative for PE, showed bilateral lower lobe PNA  -- Continue Rocephin  --Possible allergic reaction to azithromycin, discontinue today  --Urine strep and urine Legionella pending  --Initially on 2 L nasal cannula, improved, weaned down to room air  -- urine strep and urine legionella, pending  --IVF hydration  --ibuprofen prn, tramadol prn for severe pain  --Add incentive spirometer       Fibromyalgia  Anxiety/depression  Hyperlipidemia  Left carotid disease, follow with Dr. Desirae Fermin  --cont home meds, holding antihypertensive medication today    Palpitations, patient reports occasional palpitation.  -Currently on cardiac monitor, so far NSR.   -Patient was told in the ED that she has A. fib. She says it was a nurse who told her. Discussed with the that EKG in the ED showed NSR. And no documentation of A. fib in the chart. Advised that she should follow-up with PCP for event monitor.  -She had episode of hypotension yesterday  Will get echocardiogram to rule out structural disease     Obesity  Body mass index is 32.14 kg/m².     Code: full  DVT prophylaxis: lovenox  Surrogate decision maker:      -Anticipate discharge tomorrow  Barriers, echo     Subjective:     Chief Complaint / Reason for Physician Visit  Yesterday she had episode of hypotension, nausea with IV azithromycin. She tolerated azithromycin on admission. She was hypotensive received normal saline bolus and IV albumin. This morning she feels better, no headache no dizziness. She says she could not get Xanax yesterday. And she is wondering if she can get it now.     Review of Systems:  Symptom Y/N Comments  Symptom Y/N Comments   Fever/Chills    Chest Pain     Poor Appetite Edema     Cough    Abdominal Pain     Sputum    Joint Pain     SOB/PEDERSEN    Pruritis/Rash     Nausea/vomit    Tolerating PT/OT     Diarrhea    Tolerating Diet     Constipation    Other       Could NOT obtain due to:      Objective:     VITALS:   Last 24hrs VS reviewed since prior progress note. Most recent are:  Patient Vitals for the past 24 hrs:   Temp Pulse Resp BP SpO2   05/15/22 0946 -- 80 -- 127/68 92 %   05/15/22 0944 -- 81 -- 122/62 --   05/15/22 0943 97.4 °F (36.3 °C) 74 18 113/60 --   05/15/22 0300 97.6 °F (36.4 °C) 67 18 (!) 112/55 94 %   05/14/22 2351 98.2 °F (36.8 °C) 67 18 -- 94 %   05/14/22 2256 98 °F (36.7 °C) 70 18 (!) 125/57 95 %   05/14/22 2253 -- -- -- (!) 107/56 --   05/14/22 2143 -- -- -- (!) 97/43 --   05/14/22 2006 -- -- -- (!) 97/43 --   05/1955 97.5 °F (36.4 °C) 69 18 (!) 103/53 92 %   05/14/22 1920 -- 71 18 (!) 93/44 95 %   05/14/22 1912 98.2 °F (36.8 °C) 69 18 (!) 87/41 (!) 88 %   05/14/22 1600 -- 83 -- -- --   05/14/22 1500 99.9 °F (37.7 °C) 87 18 132/60 93 %     No intake or output data in the 24 hours ending 05/15/22 1439     I had a face to face encounter and independently examined this patient on 5/15/2022, as outlined below:  PHYSICAL EXAM:  General: WD, WN. Alert, cooperative, no acute distress    EENT:  EOMI. Anicteric sclerae. MMM  Resp:  Lungs clear  CV:  Regular  rhythm,  No edema  GI:  Soft, Non distended, Non tender. +Bowel sounds  Neurologic:  Alert and oriented X 3, normal speech,   Psych:   Good insight. Not anxious nor agitated  Skin:  No rashes.   No jaundice    Reviewed most current lab test results and cultures  YES  Reviewed most current radiology test results   YES  Review and summation of old records today    NO  Reviewed patient's current orders and MAR    YES  PMH/SH reviewed - no change compared to H&P  ________________________________________________________________________  Care Plan discussed with:    Comments   Patient x    Family      RN x    Care Manager     Consultant                        Multidiciplinary team rounds were held today with , nursing, pharmacist and clinical coordinator. Patient's plan of care was discussed; medications were reviewed and discharge planning was addressed. ________________________________________________________________________  Total NON critical care TIME:  23   Minutes    Total CRITICAL CARE TIME Spent:   Minutes non procedure based      Comments   >50% of visit spent in counseling and coordination of care     ________________________________________________________________________  Ole Barboza MD     Procedures: see electronic medical records for all procedures/Xrays and details which were not copied into this note but were reviewed prior to creation of Plan. LABS:  I reviewed today's most current labs and imaging studies.   Pertinent labs include:  Recent Labs     05/14/22  0425 05/13/22  0738   WBC 9.4 10.9   HGB 9.7* 11.2*   HCT 29.2* 33.5*    248     Recent Labs     05/15/22  0550 05/14/22  0425 05/13/22  0738    139 138   K 3.9 3.5 3.8   * 110* 107   CO2 21 21 25   * 141* 154*   BUN 11 22* 21*   CREA 0.68 0.82 0.91   CA 8.2* 8.3* 9.5   ALB  --   --  3.5   TBILI  --   --  0.8   ALT  --   --  22       Signed: Ole Barboza MD

## 2022-05-15 NOTE — PROGRESS NOTES
Rapid response called due to possible allergic drug reaction. Patient complained that she could not move, nauseated and diaphoretic. BP down to 88 systolic. Was receiving Azithromycin IV at the time. IV was stopped and IV site flushed. Order received to give  ml bolus. BP 93/44, Heart rate 70 Sat's 95. Zofran had been given. Patient will remain in room. Will continue to monitor. Azithromycin was discontinued and added to her list of allergies.

## 2022-05-15 NOTE — PROGRESS NOTES
Patient sitting in recliner chair with legs elevated.  at bedside. C/o SOB continuous pulse oximetry in place. Oxygen saturations 95 % on room air. Patient also voiced that her legs are swollen. Legs assessed and no pitting edema noted. Dr. Jose Maria Nash notified and  Voiced to follow results of echo.

## 2022-05-15 NOTE — PROGRESS NOTES
End of Shift Note    Bedside shift change report given to Milly Guerrero RN (oncoming nurse) by Mark Anthony Márquez RN (offgoing nurse). Report included the following information Intake/Output, MAR, Recent Results, Cardiac Rhythm PAC's,PVC's , Alarm Parameters  and Quality Measures    Shift worked:  7 am-7 pm     Shift summary and any significant changes:     no IV . If no discharge in AM family wants Dr. Hernando Combs consulted r/t 79 % left carotid occluded and appt in AM at 1045 with Dr. Ronnie Lowe     Concerns for physician to address:  Dr. Andre Reeder notified orders to get orthostatic blood pressure 1800, 2200, 0800 ok to leave IV out as long as no drop in b/p's with position changes 1000 laying 113/60 HR 74, sitting 122/62, HR 81, standing 127/68 HR 80 oxygen 92 % room air denies any dizziness. 1800 laying 130/60 HR 82, sitting 144/62 HR 85, standing 143/67 HR 87 Denied any dizziness     Zone phone for oncoming shift:   2129     Oxygen saturations 92-95 % on room air while ambulating , oxygen sats did drop to 86% but went up to 91 % with deep breathing . Patient did voice shortness of breathe Repeat oxygen sat challenge needed  Activity:  Activity Level: Up ad nikia  Number times ambulated in hallways past shift: 1  Number of times OOB to chair past shift: 3    Cardiac:   Cardiac Monitoring: Yes      Cardiac Rhythm: PVC,PAC    Access:   Current line(s): no IV     Genitourinary:   Urinary status: voiding    Respiratory:   O2 Device: None (Room air)  Chronic home O2 use?: NO  Incentive spirometer at bedside: NO       GI:     Current diet:  ADULT DIET Regular; Low Fat/Low Chol/High Fiber/KSENIA  Passing flatus: YES  Tolerating current diet: YES       Pain Management:   Patient states pain is manageable on current regimen: YES    Skin:  Kai Score: 21  Interventions: float heels, increase time out of bed and nutritional support     Patient Safety:  Fall Score:  Total Score: 1  Interventions: gripper socks, pt to call before getting OOB and stay with me (per policy)       Length of Stay:  Expected LOS: - - -  Actual LOS: 5266 Jordy Figueroa, RN

## 2022-05-16 ENCOUNTER — APPOINTMENT (OUTPATIENT)
Dept: VASCULAR SURGERY | Age: 67
DRG: 871 | End: 2022-05-16
Attending: STUDENT IN AN ORGANIZED HEALTH CARE EDUCATION/TRAINING PROGRAM
Payer: MEDICARE

## 2022-05-16 ENCOUNTER — APPOINTMENT (OUTPATIENT)
Dept: NON INVASIVE DIAGNOSTICS | Age: 67
DRG: 871 | End: 2022-05-16
Attending: INTERNAL MEDICINE
Payer: MEDICARE

## 2022-05-16 PROBLEM — J18.9 CAP (COMMUNITY ACQUIRED PNEUMONIA): Status: ACTIVE | Noted: 2022-05-16

## 2022-05-16 LAB
ANION GAP SERPL CALC-SCNC: 7 MMOL/L (ref 5–15)
BASOPHILS # BLD: 0.1 K/UL (ref 0–0.1)
BASOPHILS NFR BLD: 1 % (ref 0–1)
BNP SERPL-MCNC: 2800 PG/ML
BUN SERPL-MCNC: 17 MG/DL (ref 6–20)
BUN/CREAT SERPL: 22 (ref 12–20)
CALCIUM SERPL-MCNC: 8.8 MG/DL (ref 8.5–10.1)
CHLORIDE SERPL-SCNC: 113 MMOL/L (ref 97–108)
CO2 SERPL-SCNC: 23 MMOL/L (ref 21–32)
CREAT SERPL-MCNC: 0.78 MG/DL (ref 0.55–1.02)
DIFFERENTIAL METHOD BLD: ABNORMAL
ECHO AO ROOT DIAM: 3 CM
ECHO AO ROOT INDEX: 1.66 CM/M2
ECHO AV AREA PEAK VELOCITY: 1.4 CM2
ECHO AV AREA VTI: 1.7 CM2
ECHO AV AREA/BSA VTI: 0.9 CM2/M2
ECHO AV CUSP MM: 1.4 CM
ECHO AV MEAN GRADIENT: 10 MMHG
ECHO AV MEAN VELOCITY: 1.5 M/S
ECHO AV PEAK GRADIENT: 18 MMHG
ECHO AV PEAK GRADIENT: 18 MMHG
ECHO AV PEAK VELOCITY: 2.1 M/S
ECHO AV PEAK VELOCITY: 2.1 M/S
ECHO AV VTI: 38.7 CM
ECHO LA DIAMETER INDEX: 2.43 CM/M2
ECHO LA DIAMETER: 4.4 CM
ECHO LA TO AORTIC ROOT RATIO: 1.47
ECHO LA VOL 2C: 58 ML (ref 22–52)
ECHO LA VOL 4C: 70 ML (ref 22–52)
ECHO LA VOLUME AREA LENGTH: 65 ML
ECHO LA VOLUME INDEX A2C: 32 ML/M2 (ref 16–34)
ECHO LA VOLUME INDEX A4C: 39 ML/M2 (ref 16–34)
ECHO LA VOLUME INDEX AREA LENGTH: 36 ML/M2 (ref 16–34)
ECHO LV E' LATERAL VELOCITY: 8 CM/S
ECHO LV E' SEPTAL VELOCITY: 11 CM/S
ECHO LV EDV A2C: 94 ML
ECHO LV EDV A4C: 70 ML
ECHO LV EDV BP: 82 ML (ref 56–104)
ECHO LV EDV INDEX A4C: 39 ML/M2
ECHO LV EDV INDEX BP: 45 ML/M2
ECHO LV EDV NDEX A2C: 52 ML/M2
ECHO LV EJECTION FRACTION A2C: 81 %
ECHO LV EJECTION FRACTION A4C: 71 %
ECHO LV EJECTION FRACTION BIPLANE: 77 % (ref 55–100)
ECHO LV ESV A2C: 18 ML
ECHO LV ESV A4C: 21 ML
ECHO LV ESV BP: 19 ML (ref 19–49)
ECHO LV ESV INDEX A2C: 10 ML/M2
ECHO LV ESV INDEX A4C: 12 ML/M2
ECHO LV ESV INDEX BP: 10 ML/M2
ECHO LV FRACTIONAL SHORTENING: 28 % (ref 28–44)
ECHO LV INTERNAL DIMENSION DIASTOLE INDEX: 2.15 CM/M2
ECHO LV INTERNAL DIMENSION DIASTOLIC: 3.9 CM (ref 3.9–5.3)
ECHO LV INTERNAL DIMENSION SYSTOLIC INDEX: 1.55 CM/M2
ECHO LV INTERNAL DIMENSION SYSTOLIC: 2.8 CM
ECHO LV IVSD: 1.1 CM (ref 0.6–0.9)
ECHO LV MASS 2D: 169.4 G (ref 67–162)
ECHO LV MASS INDEX 2D: 93.6 G/M2 (ref 43–95)
ECHO LV POSTERIOR WALL DIASTOLIC: 1.4 CM (ref 0.6–0.9)
ECHO LV RELATIVE WALL THICKNESS RATIO: 0.72
ECHO LVOT AREA: 2.5 CM2
ECHO LVOT AV VTI INDEX: 0.68
ECHO LVOT DIAM: 1.8 CM
ECHO LVOT MEAN GRADIENT: 3 MMHG
ECHO LVOT PEAK GRADIENT: 6 MMHG
ECHO LVOT PEAK GRADIENT: 6 MMHG
ECHO LVOT PEAK VELOCITY: 1.2 M/S
ECHO LVOT PEAK VELOCITY: 1.2 M/S
ECHO LVOT STROKE VOLUME INDEX: 37.1 ML/M2
ECHO LVOT SV: 67.1 ML
ECHO LVOT VTI: 26.4 CM
ECHO MV A VELOCITY: 0.72 M/S
ECHO MV AREA VTI: 0.9 CM2
ECHO MV E DECELERATION TIME (DT): 160.2 MS
ECHO MV E VELOCITY: 1.18 M/S
ECHO MV E/A RATIO: 1.64
ECHO MV E/E' LATERAL: 14.75
ECHO MV E/E' RATIO (AVERAGED): 12.74
ECHO MV E/E' SEPTAL: 10.73
ECHO MV LVOT VTI INDEX: 2.71
ECHO MV MAX VELOCITY: 3.2 M/S
ECHO MV MEAN GRADIENT: 34 MMHG
ECHO MV MEAN VELOCITY: 2.4 M/S
ECHO MV PEAK GRADIENT: 48 MMHG
ECHO MV REGURGITANT PEAK GRADIENT: 88 MMHG
ECHO MV REGURGITANT PEAK VELOCITY: 4.7 M/S
ECHO MV VTI: 71.6 CM
ECHO PV MAX VELOCITY: 1.3 M/S
ECHO PV PEAK GRADIENT: 7 MMHG
ECHO RV FREE WALL PEAK S': 12 CM/S
ECHO RV INTERNAL DIMENSION: 4.3 CM
ECHO RV TAPSE: 1.7 CM (ref 1.7–?)
ECHO RVOT PEAK GRADIENT: 4 MMHG
ECHO RVOT PEAK VELOCITY: 1 M/S
ECHO TV REGURGITANT MAX VELOCITY: 2.15 M/S
ECHO TV REGURGITANT PEAK GRADIENT: 20 MMHG
EOSINOPHIL # BLD: 0.2 K/UL (ref 0–0.4)
EOSINOPHIL NFR BLD: 2 % (ref 0–7)
ERYTHROCYTE [DISTWIDTH] IN BLOOD BY AUTOMATED COUNT: 12.5 % (ref 11.5–14.5)
EST. AVERAGE GLUCOSE BLD GHB EST-MCNC: 114 MG/DL
GLUCOSE SERPL-MCNC: 125 MG/DL (ref 65–100)
HBA1C MFR BLD: 5.6 % (ref 4–5.6)
HCT VFR BLD AUTO: 29.6 % (ref 35–47)
HGB BLD-MCNC: 9.5 G/DL (ref 11.5–16)
IMM GRANULOCYTES # BLD AUTO: 0.1 K/UL (ref 0–0.04)
IMM GRANULOCYTES NFR BLD AUTO: 1 % (ref 0–0.5)
LEFT CCA DIST DIAS: 22.3 CM/S
LEFT CCA DIST SYS: 103.1 CM/S
LEFT CCA PROX DIAS: 32 CM/S
LEFT CCA PROX SYS: 99.9 CM/S
LEFT ECA DIAS: 7.9 CM/S
LEFT ECA SYS: 89.1 CM/S
LEFT ICA DIST DIAS: 29.9 CM/S
LEFT ICA DIST SYS: 89.1 CM/S
LEFT ICA MID DIAS: 61.5 CM/S
LEFT ICA MID SYS: 281.5 CM/S
LEFT ICA PROX DIAS: 72.9 CM/S
LEFT ICA PROX SYS: 233.6 CM/S
LEFT ICA/CCA SYS: 2.7 NO UNITS
LEFT VERTEBRAL DIAS: 16.7 CM/S
LEFT VERTEBRAL SYS: 76 CM/S
LYMPHOCYTES # BLD: 1.5 K/UL (ref 0.8–3.5)
LYMPHOCYTES NFR BLD: 15 % (ref 12–49)
MCH RBC QN AUTO: 29.9 PG (ref 26–34)
MCHC RBC AUTO-ENTMCNC: 32.1 G/DL (ref 30–36.5)
MCV RBC AUTO: 93.1 FL (ref 80–99)
MONOCYTES # BLD: 0.6 K/UL (ref 0–1)
MONOCYTES NFR BLD: 6 % (ref 5–13)
NEUTS SEG # BLD: 7.7 K/UL (ref 1.8–8)
NEUTS SEG NFR BLD: 75 % (ref 32–75)
NRBC # BLD: 0 K/UL (ref 0–0.01)
NRBC BLD-RTO: 0 PER 100 WBC
PLATELET # BLD AUTO: 276 K/UL (ref 150–400)
PMV BLD AUTO: 9.9 FL (ref 8.9–12.9)
POTASSIUM SERPL-SCNC: 3.3 MMOL/L (ref 3.5–5.1)
PROCALCITONIN SERPL-MCNC: <0.05 NG/ML
RBC # BLD AUTO: 3.18 M/UL (ref 3.8–5.2)
RIGHT CCA DIST DIAS: 20.5 CM/S
RIGHT CCA DIST SYS: 98.2 CM/S
RIGHT CCA PROX DIAS: 17.9 CM/S
RIGHT CCA PROX SYS: 90.5 CM/S
RIGHT ECA DIAS: 22.3 CM/S
RIGHT ECA SYS: 151.6 CM/S
RIGHT ICA DIST DIAS: 54.6 CM/S
RIGHT ICA DIST SYS: 209.8 CM/S
RIGHT ICA MID DIAS: 49.8 CM/S
RIGHT ICA MID SYS: 222 CM/S
RIGHT ICA PROX DIAS: 68.8 CM/S
RIGHT ICA PROX SYS: 287.3 CM/S
RIGHT ICA/CCA SYS: 2.9 NO UNITS
RIGHT VERTEBRAL DIAS: 22.3 CM/S
RIGHT VERTEBRAL SYS: 70.8 CM/S
SODIUM SERPL-SCNC: 143 MMOL/L (ref 136–145)
VAS LEFT SUBCLAVIAN PROX EDV: 0 CM/S
VAS LEFT SUBCLAVIAN PROX PSV: 222.7 CM/S
VAS RIGHT SUBCLAVIAN PROX EDV: 0 CM/S
VAS RIGHT SUBCLAVIAN PROX PSV: 107.9 CM/S
WBC # BLD AUTO: 10.1 K/UL (ref 3.6–11)

## 2022-05-16 PROCEDURE — 96376 TX/PRO/DX INJ SAME DRUG ADON: CPT

## 2022-05-16 PROCEDURE — 65270000046 HC RM TELEMETRY

## 2022-05-16 PROCEDURE — 74011250636 HC RX REV CODE- 250/636: Performed by: STUDENT IN AN ORGANIZED HEALTH CARE EDUCATION/TRAINING PROGRAM

## 2022-05-16 PROCEDURE — 83036 HEMOGLOBIN GLYCOSYLATED A1C: CPT

## 2022-05-16 PROCEDURE — 76937 US GUIDE VASCULAR ACCESS: CPT

## 2022-05-16 PROCEDURE — 74011250637 HC RX REV CODE- 250/637: Performed by: STUDENT IN AN ORGANIZED HEALTH CARE EDUCATION/TRAINING PROGRAM

## 2022-05-16 PROCEDURE — 93306 TTE W/DOPPLER COMPLETE: CPT | Performed by: INTERNAL MEDICINE

## 2022-05-16 PROCEDURE — 80048 BASIC METABOLIC PNL TOTAL CA: CPT

## 2022-05-16 PROCEDURE — 83880 ASSAY OF NATRIURETIC PEPTIDE: CPT

## 2022-05-16 PROCEDURE — 93306 TTE W/DOPPLER COMPLETE: CPT

## 2022-05-16 PROCEDURE — 93880 EXTRACRANIAL BILAT STUDY: CPT | Performed by: PSYCHIATRY & NEUROLOGY

## 2022-05-16 PROCEDURE — 74011250636 HC RX REV CODE- 250/636: Performed by: HOSPITALIST

## 2022-05-16 PROCEDURE — 36415 COLL VENOUS BLD VENIPUNCTURE: CPT

## 2022-05-16 PROCEDURE — 74011250637 HC RX REV CODE- 250/637: Performed by: HOSPITALIST

## 2022-05-16 PROCEDURE — 85025 COMPLETE CBC W/AUTO DIFF WBC: CPT

## 2022-05-16 PROCEDURE — G0378 HOSPITAL OBSERVATION PER HR: HCPCS

## 2022-05-16 PROCEDURE — 74011000250 HC RX REV CODE- 250: Performed by: HOSPITALIST

## 2022-05-16 PROCEDURE — 93880 EXTRACRANIAL BILAT STUDY: CPT

## 2022-05-16 PROCEDURE — 96372 THER/PROPH/DIAG INJ SC/IM: CPT

## 2022-05-16 PROCEDURE — 74011250637 HC RX REV CODE- 250/637: Performed by: NURSE PRACTITIONER

## 2022-05-16 PROCEDURE — 84145 PROCALCITONIN (PCT): CPT

## 2022-05-16 RX ORDER — ALPRAZOLAM 0.5 MG/1
1 TABLET ORAL
Status: DISCONTINUED | OUTPATIENT
Start: 2022-05-16 | End: 2022-05-17 | Stop reason: HOSPADM

## 2022-05-16 RX ORDER — LANOLIN ALCOHOL/MO/W.PET/CERES
3 CREAM (GRAM) TOPICAL
Status: DISCONTINUED | OUTPATIENT
Start: 2022-05-16 | End: 2022-05-17 | Stop reason: HOSPADM

## 2022-05-16 RX ORDER — FUROSEMIDE 10 MG/ML
20 INJECTION INTRAMUSCULAR; INTRAVENOUS 2 TIMES DAILY
Status: DISCONTINUED | OUTPATIENT
Start: 2022-05-16 | End: 2022-05-17 | Stop reason: HOSPADM

## 2022-05-16 RX ORDER — FUROSEMIDE 10 MG/ML
20 INJECTION INTRAMUSCULAR; INTRAVENOUS ONCE
Status: COMPLETED | OUTPATIENT
Start: 2022-05-16 | End: 2022-05-16

## 2022-05-16 RX ORDER — LEVOFLOXACIN 750 MG/1
750 TABLET ORAL EVERY 24 HOURS
Status: DISCONTINUED | OUTPATIENT
Start: 2022-05-16 | End: 2022-05-17 | Stop reason: HOSPADM

## 2022-05-16 RX ORDER — FACIAL-BODY WIPES
10 EACH TOPICAL DAILY PRN
Status: DISCONTINUED | OUTPATIENT
Start: 2022-05-16 | End: 2022-05-17 | Stop reason: HOSPADM

## 2022-05-16 RX ADMIN — ASPIRIN 81 MG: 81 TABLET, COATED ORAL at 08:32

## 2022-05-16 RX ADMIN — BUSPIRONE HYDROCHLORIDE 15 MG: 7.5 TABLET ORAL at 17:52

## 2022-05-16 RX ADMIN — LEVOFLOXACIN 750 MG: 750 TABLET, FILM COATED ORAL at 15:06

## 2022-05-16 RX ADMIN — CETIRIZINE HYDROCHLORIDE 10 MG: 10 TABLET, FILM COATED ORAL at 08:32

## 2022-05-16 RX ADMIN — ALPRAZOLAM 1 MG: 0.5 TABLET ORAL at 21:22

## 2022-05-16 RX ADMIN — IBUPROFEN 600 MG: 600 TABLET ORAL at 04:40

## 2022-05-16 RX ADMIN — PANTOPRAZOLE SODIUM 40 MG: 40 TABLET, DELAYED RELEASE ORAL at 08:32

## 2022-05-16 RX ADMIN — ONDANSETRON 4 MG: 2 INJECTION INTRAMUSCULAR; INTRAVENOUS at 15:43

## 2022-05-16 RX ADMIN — FUROSEMIDE 20 MG: 10 INJECTION, SOLUTION INTRAVENOUS at 20:23

## 2022-05-16 RX ADMIN — ALPRAZOLAM 1 MG: 0.5 TABLET ORAL at 10:39

## 2022-05-16 RX ADMIN — FENOFIBRATE 145 MG: 145 TABLET ORAL at 21:22

## 2022-05-16 RX ADMIN — ACETAMINOPHEN 650 MG: 650 SUPPOSITORY RECTAL at 20:24

## 2022-05-16 RX ADMIN — BISACODYL 10 MG: 10 SUPPOSITORY RECTAL at 06:34

## 2022-05-16 RX ADMIN — FUROSEMIDE 20 MG: 10 INJECTION, SOLUTION INTRAVENOUS at 15:41

## 2022-05-16 RX ADMIN — ENOXAPARIN SODIUM 40 MG: 100 INJECTION SUBCUTANEOUS at 08:32

## 2022-05-16 RX ADMIN — POTASSIUM BICARBONATE 40 MEQ: 782 TABLET, EFFERVESCENT ORAL at 12:17

## 2022-05-16 RX ADMIN — IBUPROFEN 600 MG: 600 TABLET ORAL at 20:30

## 2022-05-16 RX ADMIN — SODIUM CHLORIDE, PRESERVATIVE FREE 10 ML: 5 INJECTION INTRAVENOUS at 21:23

## 2022-05-16 RX ADMIN — BUPROPION HYDROCHLORIDE 150 MG: 150 TABLET, EXTENDED RELEASE ORAL at 08:33

## 2022-05-16 NOTE — PROGRESS NOTES
Endurance Catheter insertion note     Inserted 20 G, 8 cm long  Endurance Extended Dwell Peripheral Catheter into right upper arm using ultrasound guidance and sterile technique. Positive blood return. Patient tolerated procedure well. Denies questions or concerns at this time. The Endurance catheter is an extended dwell peripheral catheter and may remain in place 29 days. Blood samples can be obtained from this catheter. To obtain labs, a tourniquet may be used, flush with 10ml NS, waste 3ml, draw labs, flush with 20ml NS. Dressings needs to be changed with central line dressing kit using bio patch every 7 days by nurse. Primary nurse        RN notified.         [address] [facility] Vascular Access Team. PICC Nurse

## 2022-05-16 NOTE — PROGRESS NOTES
Completed orthostatic BP as ordered. Lyin/57 (81)  Sittin/65 (91)  Standin/65 (91)    Documented under VS in flowsheets.

## 2022-05-16 NOTE — PROGRESS NOTES
Hospitalist Progress Note    NAME: Heri Urbano   :  1955   MRN:  [id number]       Assessment / Plan:  Sepsis, POA as evidence by fever 101, tachycardia due to  Bilateral lower lobe and lingula pna, POA  Pleuritic chest pain, POA due to above     CTA chest negative for PE, showed bilateral lower lobe PNA  Not getting rocephin as iv line infiltrated, started po levaquin  Azithromycin was discontinued for possible allergic reaction (flushing and low bp)?, Urine strep and urine Legionella pending  On room air  Off iv fluids now, has b/l Le edema  BNP elevated to 2800, ECHO showed normal ef, mild diastolic dysfunction. Will give a trial of lasix today 20 mg iv. Continue to feel like heart is racing/ feels nervous, no tachycardia on tele. Continue with home xanax  K repleted, repeat in AM     Fibromyalgia  Anxiety/depression  Hyperlipidemia  Left carotid disease, follow with Dr. Luli Mills home meds, holding antihypertensive medication today  -Currently on cardiac monitor, so far NSR.   -Patient was told in the ED that she has A. fib. She says it was a nurse who told her. EKG here showing normal rhythm. And no documentation of A. fib in the chart. Advised that she should follow-up with PCP for event monitor.   ECHO reviewed  Had a Doppler scheduled today for f/u on Left carotid stenosis, ordered here per family request     Obesity  Body mass index is 32.14 kg/m².     Code: full  DVT prophylaxis: lovenox  Surrogate decision maker:       -Anticipate discharge tomorrow  B/l Lower ext swelling     Subjective:     Chief Complaint / Reason for Physician Visit  Follow up for CAP  She seems very nervous and anxious today, tearful during my conversation    Review of Systems:  Symptom Y/N Comments  Symptom Y/N Comments   Fever/Chills    Chest Pain     Poor Appetite    Edema     Cough    Abdominal Pain     Sputum    Joint Pain     SOB/PEDERSEN    Pruritis/Rash     Nausea/vomit    Tolerating PT/OT     Diarrhea Tolerating Diet     Constipation    Other       Could NOT obtain due to:      Objective:     VITALS:   Last 24hrs VS reviewed since prior progress note. Most recent are:  Patient Vitals for the past 24 hrs:   Temp Pulse Resp BP SpO2   05/16/22 1145 97.7 °F (36.5 °C) 79 18 122/60 91 %   05/16/22 0814 97.7 °F (36.5 °C) 81 17 (!) 132/58 94 %   05/16/22 0807 -- -- -- (!) 144/65 --   05/15/22 2346 -- -- -- (!) 144/65 --   05/15/22 2344 -- -- -- (!) 143/65 --   05/15/22 2337 -- -- -- (!) 129/57 --   05/15/22 2035 97.9 °F (36.6 °C) 74 18 (!) 115/51 94 %   05/15/22 1812 -- 87 -- (!) 143/67 94 %   05/15/22 1811 -- 85 -- (!) 144/62 93 %   05/15/22 1808 -- 82 -- 130/60 --     No intake or output data in the 24 hours ending 05/16/22 1345     I had a face to face encounter and independently examined this patient on 5/16/2022, as outlined below:  PHYSICAL EXAM:  General: Awake, No acute distress  EENT:  EOMI. Anicteric sclerae. MMM  Resp:  CTA bilaterally, no wheezing or rales. No accessory muscle use  CV:  Regular  rhythm, B/l LE edema  GI:  Soft, Non distended, Non tender. +Bowel sounds  Neurologic:  Alert and oriented X 3, normal speech,   Psych:   Not anxious nor agitated  Skin:  No rashes. No jaundice    Reviewed most current lab test results and cultures  YES  Reviewed most current radiology test results   YES  Review and summation of old records today    NO  Reviewed patient's current orders and MAR    YES  PMH/SH reviewed - no change compared to H&P  ________________________________________________________________________  Care Plan discussed with:    Comments   Patient y    Family      RN y    Care Manager     Consultant                        Multidiciplinary team rounds were held today with , nursing, pharmacist and clinical coordinator. Patient's plan of care was discussed; medications were reviewed and discharge planning was addressed. ________________________________________________________________________  Total NON critical care TIME: 37   Minutes    Total CRITICAL CARE TIME Spent:   Minutes non procedure based      Comments   >50% of visit spent in counseling and coordination of care     ________________________________________________________________________  Dutch Castleman, MD     Procedures: see electronic medical records for all procedures/Xrays and details which were not copied into this note but were reviewed prior to creation of Plan. LABS:  I reviewed today's most current labs and imaging studies.   Pertinent labs include:  Recent Labs     05/16/22 0037 05/14/22 0425   WBC 10.1 9.4   HGB 9.5* 9.7*   HCT 29.6* 29.2*    214     Recent Labs     05/16/22 0037 05/15/22  0550 05/14/22 0425    143 139   K 3.3* 3.9 3.5   * 116* 110*   CO2 23 21 21   * 201* 141*   BUN 17 11 22*   CREA 0.78 0.68 0.82   CA 8.8 8.2* 8.3*       Signed: Dutch Castleman, MD

## 2022-05-17 VITALS
HEIGHT: 62 IN | DIASTOLIC BLOOD PRESSURE: 62 MMHG | BODY MASS INDEX: 32.33 KG/M2 | SYSTOLIC BLOOD PRESSURE: 126 MMHG | RESPIRATION RATE: 18 BRPM | TEMPERATURE: 98.9 F | OXYGEN SATURATION: 95 % | WEIGHT: 175.71 LBS | HEART RATE: 82 BPM

## 2022-05-17 LAB
ANION GAP SERPL CALC-SCNC: 5 MMOL/L (ref 5–15)
BUN SERPL-MCNC: 15 MG/DL (ref 6–20)
BUN/CREAT SERPL: 17 (ref 12–20)
CALCIUM SERPL-MCNC: 8.9 MG/DL (ref 8.5–10.1)
CHLORIDE SERPL-SCNC: 108 MMOL/L (ref 97–108)
CO2 SERPL-SCNC: 27 MMOL/L (ref 21–32)
COMMENT, HOLDF: NORMAL
CREAT SERPL-MCNC: 0.88 MG/DL (ref 0.55–1.02)
GLUCOSE SERPL-MCNC: 103 MG/DL (ref 65–100)
POTASSIUM SERPL-SCNC: 3.2 MMOL/L (ref 3.5–5.1)
SAMPLES BEING HELD,HOLD: NORMAL
SODIUM SERPL-SCNC: 140 MMOL/L (ref 136–145)

## 2022-05-17 PROCEDURE — 74011250637 HC RX REV CODE- 250/637: Performed by: HOSPITALIST

## 2022-05-17 PROCEDURE — 74011250636 HC RX REV CODE- 250/636: Performed by: STUDENT IN AN ORGANIZED HEALTH CARE EDUCATION/TRAINING PROGRAM

## 2022-05-17 PROCEDURE — 74011250636 HC RX REV CODE- 250/636: Performed by: HOSPITALIST

## 2022-05-17 PROCEDURE — 80048 BASIC METABOLIC PNL TOTAL CA: CPT

## 2022-05-17 PROCEDURE — 74011000250 HC RX REV CODE- 250: Performed by: HOSPITALIST

## 2022-05-17 PROCEDURE — 36415 COLL VENOUS BLD VENIPUNCTURE: CPT

## 2022-05-17 RX ORDER — LEVOFLOXACIN 750 MG/1
750 TABLET ORAL EVERY 24 HOURS
Qty: 4 TABLET | Refills: 0 | Status: SHIPPED | OUTPATIENT
Start: 2022-05-17 | End: 2022-05-21

## 2022-05-17 RX ORDER — FUROSEMIDE 20 MG/1
20 TABLET ORAL DAILY
Qty: 30 TABLET | Refills: 2 | Status: SHIPPED | OUTPATIENT
Start: 2022-05-17

## 2022-05-17 RX ORDER — POTASSIUM CHLORIDE 750 MG/1
40 TABLET, FILM COATED, EXTENDED RELEASE ORAL
Status: DISCONTINUED | OUTPATIENT
Start: 2022-05-17 | End: 2022-05-17 | Stop reason: HOSPADM

## 2022-05-17 RX ADMIN — FUROSEMIDE 20 MG: 10 INJECTION, SOLUTION INTRAVENOUS at 09:45

## 2022-05-17 RX ADMIN — PANTOPRAZOLE SODIUM 40 MG: 40 TABLET, DELAYED RELEASE ORAL at 09:46

## 2022-05-17 RX ADMIN — SODIUM CHLORIDE, PRESERVATIVE FREE 10 ML: 5 INJECTION INTRAVENOUS at 05:37

## 2022-05-17 RX ADMIN — CETIRIZINE HYDROCHLORIDE 10 MG: 10 TABLET, FILM COATED ORAL at 09:46

## 2022-05-17 RX ADMIN — ASPIRIN 81 MG: 81 TABLET, COATED ORAL at 09:46

## 2022-05-17 RX ADMIN — BUPROPION HYDROCHLORIDE 150 MG: 150 TABLET, EXTENDED RELEASE ORAL at 09:46

## 2022-05-17 RX ADMIN — ENOXAPARIN SODIUM 40 MG: 100 INJECTION SUBCUTANEOUS at 09:45

## 2022-05-17 NOTE — PROGRESS NOTES
Problem: Falls - Risk of  Goal: *Absence of Falls  Description: Document Kandace Malik Fall Risk and appropriate interventions in the flowsheet.   Outcome: Progressing Towards Goal  Note: Fall Risk Interventions:            Medication Interventions: Patient to call before getting OOB,Teach patient to arise slowly         History of Falls Interventions: Evaluate medications/consider consulting pharmacy

## 2022-05-17 NOTE — DISCHARGE INSTRUCTIONS
Discharge Instructions       PATIENT ID: Leonel Jackman  MRN: [id number]   YOB: 1955    DATE OF ADMISSION: 5/13/2022  7:25 AM    DATE OF DISCHARGE: 5/17/2022    PRIMARY CARE PROVIDER: Myra Hansen MD       DISCHARGING PHYSICIAN: Heladio Houser MD    To contact this individual call [phone] and ask the  to page. If unavailable ask to be transferred the Adult Hospitalist Department. DISCHARGE DIAGNOSES Bilateral lower Lobe PNA, Bilateral carotid artery stenosis, obesity    CONSULTATIONS: None    PROCEDURES/SURGERIES: * No surgery found *    PENDING TEST RESULTS:   At the time of discharge the following test results are still pending:     FOLLOW UP APPOINTMENTS:   Follow-up Information     Follow up With Specialties Details Why Contact Info    Myra Hansen MD Family Medicine Schedule an appointment as soon as possible for a visit in 2 days Dr. Chandler Carcamo office will contact you to schedule your PCP hospital follow up. [address]  [phone]             ADDITIONAL CARE RECOMMENDATIONS:     DIET: Cardiac Diet    ACTIVITY: Activity as tolerated    WOUND CARE:     EQUIPMENT needed:       DISCHARGE MEDICATIONS:   See Medication Reconciliation Form    · It is important that you take the medication exactly as they are prescribed. · Keep your medication in the bottles provided by the pharmacist and keep a list of the medication names, dosages, and times to be taken in your wallet. · Do not take other medications without consulting your doctor. NOTIFY YOUR PHYSICIAN FOR ANY OF THE FOLLOWING:   Fever over 101 degrees for 24 hours. Chest pain, shortness of breath, fever, chills, nausea, vomiting, diarrhea, change in mentation, falling, weakness, bleeding. Severe pain or pain not relieved by medications. Or, any other signs or symptoms that you may have questions about.       DISPOSITION:   x Home With:   OT  PT  [facility]  RN       SNF/Inpatient Rehab/LTAC    Independent/assisted living    Hospice    Other:     CDMP Checked:    Yes X       Signed:   Ashley Montano MD  5/17/2022  10:44 AM

## 2022-05-17 NOTE — DISCHARGE SUMMARY
Discharge Summary       PATIENT ID: Ebonie Quan  MRN: [id number]   YOB: 1955    DATE OF ADMISSION: 5/13/2022  7:25 AM    DATE OF DISCHARGE: 5/17/2022   PRIMARY CARE PROVIDER: Ned Smith MD       DISCHARGING PHYSICIAN: Elian Hewitt MD    To contact this individual call [phone] and ask the  to page. If unavailable ask to be transferred the Adult Hospitalist Department. CONSULTATIONS: None    PROCEDURES/SURGERIES: * No surgery found *    ADMITTING DIAGNOSES & HOSPITAL COURSE:     Sepsis, POA as evidence by fever 101, tachycardia due to  Bilateral lower lobe and lingula pna, POA  Pleuritic chest pain, POA due to above     CTA chest negative for PE, showed bilateral lower lobe PNA  Not getting rocephin as iv line infiltrated, started po levaquin  Azithromycin was discontinued for possible allergic reaction (flushing and low bp)? On room air  Off iv fluids now, has b/l Le edema  BNP elevated to 2800, ECHO showed normal ef, mild diastolic dysfunction. She received lasix 20 mg I.V BID on admission. This was changed to lasix- furesemide 20 mg PO daily  D/c home on levaquin 750 mg PO daily to complete rest of antibiotics course     Fibromyalgia  Anxiety/depression  Hyperlipidemia  Left carotid disease, follow with Dr. Nellie Wiggins home meds, holding antihypertensive medication today  -Currently on cardiac monitor, so far NSR.   -Patient was told in the ED that she has A. fib.  She says it was a nurse who told her. EKG here showing normal rhythm.  And no documentation of A. fib in the chart.  Advised that she should follow-up with PCP for event monitor. ECHO reviewed  Repeat carotid duplex shows bilateral ICA stenosis > 70%. F/u with vascular     Obesity  Body mass index is 32.14 kg/m².     Disposition: Hemodynamically stable and has improved. D/c to home with family care        DISCHARGE DIAGNOSES / PLAN:      1.   As above       PENDING TEST RESULTS:   At the time of discharge the following test results are still pending:    FOLLOW UP APPOINTMENTS:    Follow-up Information     Follow up With Specialties Details Why Contact Info    Ruben May, [address]  [address] [phone]             ADDITIONAL CARE RECOMMENDATIONS:     DIET: Cardiac Diet    ACTIVITY: Activity as tolerated    WOUND CARE:     EQUIPMENT needed:       DISCHARGE MEDICATIONS:  Current Discharge Medication List      START taking these medications    Details   levoFLOXacin (LEVAQUIN) 750 mg tablet Take 1 Tablet by mouth every twenty-four (24) hours for 4 doses. Qty: 4 Tablet, Refills: 0  Start date: 5/17/2022, End date: 5/21/2022      furosemide (LASIX) 20 mg tablet Take 1 Tablet by mouth daily. Qty: 30 Tablet, Refills: 2  Start date: 5/17/2022         CONTINUE these medications which have NOT CHANGED    Details   buPROPion XL (WELLBUTRIN XL) 150 mg tablet Take 150 mg by mouth daily. alendronate (FOSAMAX) 70 mg tablet Take 70 mg by mouth Every Saturday. ALPRAZolam (XANAX) 0.5 mg tablet Take 1 Tab by mouth daily as needed for Anxiety. Qty: 30 Tab, Refills: 0    Associated Diagnoses: Anxiety disorder, unspecified type; Severe major depression without psychotic features (HCC)      busPIRone (BUSPAR) 10 mg tablet Take 1 Tab by mouth two (2) times a day. Qty: 60 Tab, Refills: 2    Comments: Please cancel the previous prescription. irbesartan (AVAPRO) 300 mg tablet Take 300 mg by mouth nightly. cetirizine (ZYRTEC) 10 mg tablet Take  by mouth. amLODIPine (NORVASC) 5 mg tablet Take 10 mg by mouth daily. ASPIRIN PO Take 81 mg by mouth daily. esomeprazole (NEXIUM) 20 mg capsule Take 20 mg by mouth daily. estradiol (VAGIFEM) 10 mcg tab vaginal tablet Insert 10 mcg into vagina two (2) days a week. fenofibrate 160 mg Tab Take 1 Tab by mouth nightly.       ONETOUCH ULTRA TEST strip                NOTIFY YOUR PHYSICIAN FOR ANY OF THE FOLLOWING: Fever over 101 degrees for 24 hours. Chest pain, shortness of breath, fever, chills, nausea, vomiting, diarrhea, change in mentation, falling, weakness, bleeding. Severe pain or pain not relieved by medications. Or, any other signs or symptoms that you may have questions about. DISPOSITION:  x  Home With:   OT  PT  HH  RN       Long term SNF/Inpatient Rehab    Independent/assisted living    Hospice    Other:       PATIENT CONDITION AT DISCHARGE:     Functional status    Poor     Deconditioned    x Independent      Cognition    x Lucid     Forgetful     Dementia      Catheters/lines (plus indication)    Peace     PICC     PEG    x None      Code status   x  Full code     DNR      PHYSICAL EXAMINATION AT DISCHARGE:   Refer to Progress Note    Gen: Not in any distress. Obese  HEENT: AT/NC  Resp: Reduced BS at bases  CVS: S1 and S2 only, RRR  Abd: Soft.  Non tender  Ext; no edema  Neuro: AAOX 3, no focal deficits    CHRONIC MEDICAL DIAGNOSES:  Problem List as of 5/17/2022 Date Reviewed: 7/22/2016          Codes Class Noted - Resolved    CAP (community acquired pneumonia) ICD-10-CM: J18.9  ICD-9-CM: 486  5/16/2022 - Present        Pneumonia ICD-10-CM: J18.9  ICD-9-CM: 486  5/13/2022 - Present        Tachycardia ICD-10-CM: R00.0  ICD-9-CM: 785.0  5/13/2022 - Present        Severe major depression without psychotic features ([facility] [address] ICD-10-CM: F32.2  ICD-9-CM: 296.23  9/14/2017 - Present        Anxiety disorder ICD-10-CM: F41.9  ICD-9-CM: 300.00  9/14/2017 - Present        Obstructive sleep apnea (adult) (pediatric) ICD-10-CM: G47.33  ICD-9-CM: 327.23  12/5/2011 - Present        REM sleep behavior disorder ICD-10-CM: G47.52  ICD-9-CM: 327.42  12/5/2011 - Present        Mixed hyperlipidemia ICD-10-CM: E78.2  ICD-9-CM: 272.2  11/10/2010 - Present        Essential hypertension, benign ICD-10-CM: I10  ICD-9-CM: 401.1  11/10/2010 - Present        Family history of cardiovascular disease ICD-10-CM: Z82.49  ICD-9-CM: V17.49 11/10/2010 - Present        Nonspecific abnormal electrocardiogram (ECG) (EKG) ICD-10-CM: R94.31  ICD-9-CM: 794.31  11/10/2010 - Present        RESOLVED: Recurrent major depressive disorder, in partial remission ([facility] ICD-10-CM: F33.41  ICD-9-CM: 296.35  12/6/2017 - 6/7/2018        RESOLVED: Breast disorder ICD-10-CM: N64.9  ICD-9-CM: 611.9  3/24/2011 - 6/7/2018        RESOLVED: Chest pain, unspecified ICD-10-CM: R07.9  ICD-9-CM: 786.50  11/10/2010 - 6/7/2018        RESOLVED: Palpitations ICD-10-CM: R00.2  ICD-9-CM: 785.1  11/10/2010 - 6/7/2018              Greater than 30 minutes were spent with the patient on counseling and coordination of care    Signed:   Annabelle Bruce MD  5/17/2022  10:35 AM

## 2022-05-17 NOTE — PROGRESS NOTES
Transition of Care Plan:     RUR: 6% - low   Disposition: Home with spouse   Follow up appointments: PCP ,Vascular Dr Dixie Rasmussen   DME needed: No- Pt is very independent  Transportation at Discharge: Spouse will transport   [address] or means to access home: Yes       IM Medicare Letter: 2nd IM provided   Is patient a BCPI-A Bundle:   No                  If yes, was Bundle Letter given?:  NA  Is patient a [place] and connected with the [address]? No              If yes, was [place] transfer form completed and [facility] notified? NA  Caregiver Contact: Gerrianne Aase- Spouse- [phone]  Discharge Caregiver contacted prior to discharge? No  Care Conference needed?: No    Initial Note: Chart reviewed. CM aware of discharge order. CM met with pt at bedside to review dc plan. 2nd IM provided, signed and placed on chart. Pt's spouse at bedside to transport. Pt is agreeable to dc plan and expressed no additional questions or concerns. No further CM needs identified. Care Management Interventions  PCP Verified by CM: Yes  Palliative Care Criteria Met (RRAT>21 & CHF Dx)?: No  Mode of Transport at Discharge:  Other (see comment) (spouse at bedside to transport)  Transition of Care Consult (CM Consult): Discharge Planning  Discharge Durable Medical Equipment: No  Physical Therapy Consult: No  Occupational Therapy Consult: No  Speech Therapy Consult: No  Support Systems: Spouse/Significant Other  Confirm Follow Up Transport: Family  The Patient and/or Patient Representative was Provided with a Choice of Provider and Agrees with the Discharge Plan?: Yes  Freedom of Choice List was Provided with Basic Dialogue that Supports the Patient's Individualized Plan of Care/Goals, Treatment Preferences and Shares the Quality Data Associated with the Providers?: No  [facility] Resource Information Provided?: No  Discharge Location  Patient Expects to be Discharged to[de-identified] Home    RO Munson  Care Manager, [facility]  [phone]

## 2022-05-17 NOTE — PROGRESS NOTES
Transition of Care Plan:    RUR: 5  Disposition: Home   Follow up appointments: PCP ,Vascular Dr Marilee Ling   DME needed: No- Pt is very independent  Transportation at Discharge: Spouse will transport   [address] or means to access home: Yes       IM Medicare Letter: Will provide upon dc   Is patient a BCPI-A Bundle:   NA       If yes, was Bundle Letter given?:  NA  Is patient a  and connected with the [address]? NA              If yes, was [place] transfer form completed and VA notified? NA  Caregiver Contact: Cr Clinton- Spouse- [phone]  Discharge Caregiver contacted prior to discharge? No  Care Conference needed?:         No    Reason for Admission: Spesis                   RUR Score:  5                    Plan for utilizing home health:   Yes       PCP: First and Last name:  Pete Taveras MD     Name of Practice:    Are you a current patient: Yes/No: Yes   Approximate date of last visit: One month  Can you participate in a virtual visit with your PCP:                     Current Advanced Directive/Advance Care Plan: Full Code      Healthcare Decision Maker:   Click here to complete [address] including selection of the Healthcare Decision Maker Relationship (ie \"Primary\")                             Transition of Care Plan:  Home    Care Management Interventions  PCP Verified by CM: Yes  Mode of Transport at Discharge: [address] (CM Consult): Discharge Planning (Pt never had any experiances with HH,SNF,IPR)  Discharge Durable Medical Equipment:  (Independent)  Support Systems: Spouse/Significant Other  Confirm Follow Up Transport: Family  Discharge Location  Patient Expects to be Discharged to[de-identified] [place] [address] (ACP) Conversation      Date of Conversation: 5/13/2022  Conducted with: Patient with Decision Making Capacity    Healthcare Decision Maker:   No healthcare decision makers have been documented.    Click here to complete [address] including selection of the Healthcare Decision Maker Relationship (ie \"Primary\")      Content/Action Overview:   DECLINED ACP conversation - will revisit periodically   Reviewed DNR/DNI and patient elects Full Code (Attempt Resuscitation)    Length of Voluntary ACP Conversation in minutes:  12 minutes    [address] MSW  ED Case Manager   Ext [phone]

## 2022-05-17 NOTE — PROGRESS NOTES
Attempted to schedule hospital follow up PCP appointment. Office  will contact the patient with appointment information per office protocol. Pending patient discharge.  Chuck Sr, Care Management Assistant

## 2022-05-18 LAB
BACTERIA SPEC CULT: NORMAL
SERVICE CMNT-IMP: NORMAL

## 2022-05-19 ENCOUNTER — TRANSCRIBE ORDER (OUTPATIENT)
Dept: SCHEDULING | Age: 67
End: 2022-05-19

## 2022-05-19 DIAGNOSIS — I65.29 CAROTID ARTERY STENOSIS: Primary | ICD-10-CM

## 2022-05-27 ENCOUNTER — HOSPITAL ENCOUNTER (OUTPATIENT)
Dept: CT IMAGING | Age: 67
Discharge: HOME | End: 2022-05-27
Attending: SURGERY
Payer: MEDICARE

## 2022-05-27 DIAGNOSIS — I65.29 CAROTID ARTERY STENOSIS: ICD-10-CM

## 2022-05-27 PROCEDURE — 74011000636 HC RX REV CODE- 636: Performed by: SURGERY

## 2022-05-27 PROCEDURE — 70498 CT ANGIOGRAPHY NECK: CPT

## 2022-05-27 RX ADMIN — IOPAMIDOL 100 ML: 755 INJECTION, SOLUTION INTRAVENOUS at 13:40

## 2022-07-14 ENCOUNTER — TRANSCRIBE ORDER (OUTPATIENT)
Dept: SCHEDULING | Age: 67
End: 2022-07-14

## 2022-07-14 DIAGNOSIS — I77.9 DISEASE OF ARTERY (HCC): ICD-10-CM

## 2022-07-14 DIAGNOSIS — I65.23 BILATERAL CAROTID ARTERY OCCLUSION: ICD-10-CM

## 2022-07-14 DIAGNOSIS — I77.9 DISEASE OF ARTERY (HCC): Primary | ICD-10-CM

## 2022-07-14 DIAGNOSIS — I65.23 BILATERAL CAROTID ARTERY OCCLUSION: Primary | ICD-10-CM

## 2022-07-28 ENCOUNTER — HOSPITAL ENCOUNTER (OUTPATIENT)
Dept: MRI IMAGING | Age: 67
Discharge: HOME | End: 2022-07-28
Attending: SURGERY
Payer: MEDICARE

## 2022-07-28 DIAGNOSIS — I65.23 BILATERAL CAROTID ARTERY OCCLUSION: ICD-10-CM

## 2022-07-28 DIAGNOSIS — I77.9 DISEASE OF ARTERY (HCC): ICD-10-CM

## 2022-07-28 PROCEDURE — 70547 MR ANGIOGRAPHY NECK W/O DYE: CPT

## 2023-12-27 ENCOUNTER — ANESTHESIA (OUTPATIENT)
Facility: HOSPITAL | Age: 68
End: 2023-12-27
Payer: MEDICARE

## 2023-12-27 ENCOUNTER — ANESTHESIA EVENT (OUTPATIENT)
Facility: HOSPITAL | Age: 68
End: 2023-12-27
Payer: MEDICARE

## 2023-12-27 ENCOUNTER — HOSPITAL ENCOUNTER (OUTPATIENT)
Facility: HOSPITAL | Age: 68
Setting detail: OUTPATIENT SURGERY
Discharge: HOME | End: 2023-12-27
Attending: INTERNAL MEDICINE | Admitting: INTERNAL MEDICINE
Payer: MEDICARE

## 2023-12-27 VITALS
OXYGEN SATURATION: 96 % | HEIGHT: 62 IN | BODY MASS INDEX: 31.41 KG/M2 | RESPIRATION RATE: 13 BRPM | DIASTOLIC BLOOD PRESSURE: 44 MMHG | WEIGHT: 170.7 LBS | HEART RATE: 64 BPM | SYSTOLIC BLOOD PRESSURE: 110 MMHG | TEMPERATURE: 98 F

## 2023-12-27 PROCEDURE — 88305 TISSUE EXAM BY PATHOLOGIST: CPT

## 2023-12-27 PROCEDURE — 3600007502: Performed by: INTERNAL MEDICINE

## 2023-12-27 PROCEDURE — 2500000003 HC RX 250 WO HCPCS: Performed by: NURSE ANESTHETIST, CERTIFIED REGISTERED

## 2023-12-27 PROCEDURE — 7100000011 HC PHASE II RECOVERY - ADDTL 15 MIN: Performed by: INTERNAL MEDICINE

## 2023-12-27 PROCEDURE — 2580000003 HC RX 258: Performed by: INTERNAL MEDICINE

## 2023-12-27 PROCEDURE — 3600007512: Performed by: INTERNAL MEDICINE

## 2023-12-27 PROCEDURE — 6360000002 HC RX W HCPCS: Performed by: NURSE ANESTHETIST, CERTIFIED REGISTERED

## 2023-12-27 PROCEDURE — 3700000001 HC ADD 15 MINUTES (ANESTHESIA): Performed by: INTERNAL MEDICINE

## 2023-12-27 PROCEDURE — 2709999900 HC NON-CHARGEABLE SUPPLY: Performed by: INTERNAL MEDICINE

## 2023-12-27 PROCEDURE — 3700000000 HC ANESTHESIA ATTENDED CARE: Performed by: INTERNAL MEDICINE

## 2023-12-27 PROCEDURE — 7100000010 HC PHASE II RECOVERY - FIRST 15 MIN: Performed by: INTERNAL MEDICINE

## 2023-12-27 RX ORDER — SODIUM CHLORIDE 0.9 % (FLUSH) 0.9 %
5-40 SYRINGE (ML) INJECTION EVERY 12 HOURS SCHEDULED
Status: DISCONTINUED | OUTPATIENT
Start: 2023-12-27 | End: 2023-12-27 | Stop reason: HOSPADM

## 2023-12-27 RX ORDER — SODIUM CHLORIDE 9 MG/ML
INJECTION, SOLUTION INTRAVENOUS CONTINUOUS PRN
Status: COMPLETED | OUTPATIENT
Start: 2023-12-27 | End: 2023-12-27

## 2023-12-27 RX ORDER — HYDROCHLOROTHIAZIDE 25 MG/1
25 TABLET ORAL DAILY
COMMUNITY

## 2023-12-27 RX ORDER — SODIUM CHLORIDE 0.9 % (FLUSH) 0.9 %
5-40 SYRINGE (ML) INJECTION PRN
Status: DISCONTINUED | OUTPATIENT
Start: 2023-12-27 | End: 2023-12-27 | Stop reason: HOSPADM

## 2023-12-27 RX ORDER — SODIUM CHLORIDE 9 MG/ML
25 INJECTION, SOLUTION INTRAVENOUS PRN
Status: DISCONTINUED | OUTPATIENT
Start: 2023-12-27 | End: 2023-12-27 | Stop reason: HOSPADM

## 2023-12-27 RX ORDER — ACETAMINOPHEN 160 MG
TABLET,DISINTEGRATING ORAL
COMMUNITY

## 2023-12-27 RX ORDER — SODIUM CHLORIDE 9 MG/ML
INJECTION, SOLUTION INTRAVENOUS CONTINUOUS
Status: DISCONTINUED | OUTPATIENT
Start: 2023-12-27 | End: 2023-12-27 | Stop reason: HOSPADM

## 2023-12-27 RX ORDER — LIDOCAINE HYDROCHLORIDE 20 MG/ML
INJECTION, SOLUTION EPIDURAL; INFILTRATION; INTRACAUDAL; PERINEURAL PRN
Status: DISCONTINUED | OUTPATIENT
Start: 2023-12-27 | End: 2023-12-27 | Stop reason: SDUPTHER

## 2023-12-27 RX ORDER — FLECAINIDE ACETATE 100 MG/1
100 TABLET ORAL 2 TIMES DAILY
COMMUNITY

## 2023-12-27 RX ADMIN — PROPOFOL 30 MG: 10 INJECTION, EMULSION INTRAVENOUS at 12:28

## 2023-12-27 RX ADMIN — PROPOFOL 30 MG: 10 INJECTION, EMULSION INTRAVENOUS at 12:25

## 2023-12-27 RX ADMIN — PROPOFOL 70 MG: 10 INJECTION, EMULSION INTRAVENOUS at 12:17

## 2023-12-27 RX ADMIN — PROPOFOL 20 MG: 10 INJECTION, EMULSION INTRAVENOUS at 12:37

## 2023-12-27 RX ADMIN — PROPOFOL 20 MG: 10 INJECTION, EMULSION INTRAVENOUS at 12:39

## 2023-12-27 RX ADMIN — PROPOFOL 30 MG: 10 INJECTION, EMULSION INTRAVENOUS at 12:36

## 2023-12-27 RX ADMIN — PROPOFOL 50 MG: 10 INJECTION, EMULSION INTRAVENOUS at 12:19

## 2023-12-27 RX ADMIN — PROPOFOL 30 MG: 10 INJECTION, EMULSION INTRAVENOUS at 12:34

## 2023-12-27 RX ADMIN — LIDOCAINE HYDROCHLORIDE 50 MG: 20 INJECTION, SOLUTION EPIDURAL; INFILTRATION; INTRACAUDAL; PERINEURAL at 12:15

## 2023-12-27 RX ADMIN — PROPOFOL 50 MG: 10 INJECTION, EMULSION INTRAVENOUS at 12:16

## 2023-12-27 RX ADMIN — PROPOFOL 30 MG: 10 INJECTION, EMULSION INTRAVENOUS at 12:23

## 2023-12-27 RX ADMIN — PROPOFOL 80 MG: 10 INJECTION, EMULSION INTRAVENOUS at 12:15

## 2023-12-27 RX ADMIN — PROPOFOL 30 MG: 10 INJECTION, EMULSION INTRAVENOUS at 12:32

## 2023-12-27 RX ADMIN — PROPOFOL 30 MG: 10 INJECTION, EMULSION INTRAVENOUS at 12:30

## 2023-12-27 NOTE — OP NOTE
[address] [place] [address], [address]  [phone]                           Colonoscopy and EGD Procedure Note      Indications:    Personal history of colon polyps (screening only), GERD      :  Dheeraj Watson MD    Staff: Circulator: Margaux Gonzalez RN  Endoscopy Technician: Kody Karimi     Implants: none    Referring Provider: Wendi Brock MD    Sedation:  MAC anesthesia    Procedure Details:  After informed consent was obtained with all risks and benefits of procedure explained and preoperative exam completed, the patient was taken to the endoscopy suite and placed in the left lateral decubitus position. Upon sequential sedation as per above, a digital rectal exam was performed  And was normal.  The Olympus videocolonoscope  was inserted in the rectum and carefully advanced to the cecum, which was identified by the ileocecal valve and appendiceal orifice. The quality of preparation was good. The colonoscope was slowly withdrawn with careful evaluation between folds. Retroflexion in the rectum was performed and was normal..     Colon Findings:   Rectum: no mucosal lesion appreciated  Grade 2 internal and external hemorrhoid(s); Sigmoid: no mucosal lesion appreciated      -Diverticulosis  Descending Colon: 1  Sessile polyp(s), the largest 6 mm in size noted at splenic flexure;  Transverse Colon: no mucosal lesion appreciated  Ascending Colon: 2  Sessile polyp(s), the largest 6 mm in size;  Cecum: no mucosal lesion appreciated  Terminal Ileum: not intubated      Following sequential administration of sedation as per above, the [id number] gastroscope was inserted into the mouth and advanced under direct vision to second portion of the duodenum. A careful inspection was made as the gastroscope was withdrawn, including a retroflexed view of the proximal stomach; findings and interventions are described below. EGD Findings:  Esophagus:Normal mucosa.

## 2023-12-27 NOTE — ANESTHESIA POSTPROCEDURE EVALUATION
Department of Anesthesiology  Postprocedure Note    Patient: Wendi Lepe  MRN: [id number]  YOB: 1955  Date of evaluation: 12/27/2023    Procedure Summary       Date: 12/27/23 Room / Location: [facility] ENDO 06 / [facility] ENDOSCOPY    Anesthesia Start: 1202 Anesthesia Stop: 1243    Procedures:       COLONOSCOPY, EGD (Lower GI Region)      EGD ESOPHAGOGASTRODUODENOSCOPY (Upper GI Region) Diagnosis:       Nausea      Personal history of colonic polyps      Gastroesophageal reflux disease, unspecified whether esophagitis present      (Nausea [R11.0])      (Personal history of colonic polyps [Z86.010])      (Gastroesophageal reflux disease, unspecified whether esophagitis present [K21.9])    Surgeons: Hermann Holland MD Responsible Provider: Jani Siu MD    Anesthesia Type: MAC ASA Status: 2            Anesthesia Type: MAC    Ravi Phase I: Ravi Score: 10    Ravi Phase II: Ravi Score: 10    Anesthesia Post Evaluation    Patient location during evaluation: PACU  Level of consciousness: awake  Airway patency: patent  Nausea & Vomiting: no nausea  Cardiovascular status: blood pressure returned to baseline  Respiratory status: acceptable  Hydration status: euvolemic  Comments: --------------------            12/27/23               1305     --------------------   BP:     (!) 110/44   Pulse:      64       Resp:       13       Temp:                SpO2:      96%      --------------------   Pain management: satisfactory to patient    No notable events documented.

## 2023-12-27 NOTE — PROGRESS NOTES

## 2023-12-27 NOTE — H&P
[address] [place] [address] [address]  [phone]                                History and Physical     NAME: Joy Vega   :  1955   MRN:  [id number]     HPI:  The patient was seen and examined. Past Surgical History:   Procedure Laterality Date    BREAST SURGERY      breast bxs - benign    CATARACT REMOVAL      bilateral    GI      EGD x3 - polyp removed/still has 2 polyps    GYN      ovarian cystectomy-left     HEENT      left chest cystectomy    HYSTERECTOMY (CERVIX STATUS UNKNOWN)      ORTHOPEDIC SURGERY      left arm cystectomy-  age 12    OTHER SURGICAL HISTORY      \"entwined nerves\" removed from above right ankle    SALPINGO-OOPHORECTOMY      TONSILLECTOMY      TUBAL LIGATION       Past Medical History:   Diagnosis Date    Arthritis     Breast disorder 2011    fibrocystic breasts    Carotid stenosis, left     Chest pain, unspecified 11/10/2010    Chronic pain     abdomen/stomach/constipation    Essential hypertension, benign 11/10/2010    Family history of cardiovascular disease 11/10/2010    Fibromyalgia     Genital herpes     GERD (gastroesophageal reflux disease)     Hypertension     Mixed hyperlipidemia 11/10/2010    Nausea & vomiting     Nonspecific abnormal electrocardiogram (ECG) (EKG) 11/10/2010    Osteopenia     Palpitations 11/10/2010    Sleep apnea     does not sleep with machine but carries the dx      Social History     Tobacco Use    Smoking status: Former     Current packs/day: 0.50     Types: Cigarettes    Smokeless tobacco: Never    Tobacco comments:     Quit smoking: quit 2 1/2 years ago - was an on and off smoker then   Substance Use Topics    Alcohol use: Yes    Drug use: No     Allergies   Allergen Reactions    Azithromycin Anaphylaxis     O2 and BP dropped rapidly. Doxycycline Other (See Comments)     Stomach pain and diarrhea. Has taken since without problems.     Gentamicin Swelling    Omeprazole-Sodium Bicarbonate

## (undated) DEVICE — FORCEPS BX L240CM JAW DIA2.4MM ORNG L CAP W/ NDL DISP RAD

## (undated) DEVICE — TRAP SURG QUAD PARABOLA SLOT DSGN SFTY SCRN TRAPEASE

## (undated) DEVICE — SNARE VASC L240CM LOOP W10MM SHTH DIA2.4MM RND STIFF CLD